# Patient Record
Sex: MALE | Race: BLACK OR AFRICAN AMERICAN | NOT HISPANIC OR LATINO | Employment: OTHER | URBAN - METROPOLITAN AREA
[De-identification: names, ages, dates, MRNs, and addresses within clinical notes are randomized per-mention and may not be internally consistent; named-entity substitution may affect disease eponyms.]

---

## 2018-11-14 ENCOUNTER — HOSPITAL ENCOUNTER (INPATIENT)
Facility: HOSPITAL | Age: 73
LOS: 9 days | Discharge: NURSING, REHAB OR LONG-TERM CARE | DRG: 064 | End: 2018-11-23
Attending: EMERGENCY MEDICINE | Admitting: PSYCHIATRY & NEUROLOGY
Payer: COMMERCIAL

## 2018-11-14 DIAGNOSIS — I61.9 INTRACEREBRAL HEMORRHAGE: ICD-10-CM

## 2018-11-14 DIAGNOSIS — R53.1 RIGHT SIDED WEAKNESS: ICD-10-CM

## 2018-11-14 DIAGNOSIS — I63.9 STROKE: ICD-10-CM

## 2018-11-14 DIAGNOSIS — I61.0 NONTRAUMATIC SUBCORTICAL HEMORRHAGE OF LEFT CEREBRAL HEMISPHERE: ICD-10-CM

## 2018-11-14 DIAGNOSIS — I10 HYPERTENSION, UNSPECIFIED TYPE: ICD-10-CM

## 2018-11-14 DIAGNOSIS — I61.9 ICH (INTRACEREBRAL HEMORRHAGE): ICD-10-CM

## 2018-11-14 DIAGNOSIS — I62.9 INTRACRANIAL HEMORRHAGE: Primary | ICD-10-CM

## 2018-11-14 DIAGNOSIS — I63.02 THROMBOTIC STROKE INVOLVING BASILAR ARTERY: ICD-10-CM

## 2018-11-14 DIAGNOSIS — R47.1 DYSARTHRIA: ICD-10-CM

## 2018-11-14 PROBLEM — N18.30 STAGE 3 CHRONIC KIDNEY DISEASE: Status: ACTIVE | Noted: 2018-11-14

## 2018-11-14 LAB
ALBUMIN SERPL BCP-MCNC: 3.3 G/DL
ALP SERPL-CCNC: 70 U/L
ALT SERPL W/O P-5'-P-CCNC: 13 U/L
ANION GAP SERPL CALC-SCNC: 7 MMOL/L
APTT BLDCRRT: 27.3 SEC
AST SERPL-CCNC: 21 U/L
BACTERIA #/AREA URNS AUTO: NORMAL /HPF
BASOPHILS # BLD AUTO: 0.01 K/UL
BASOPHILS NFR BLD: 0.3 %
BILIRUB SERPL-MCNC: 0.4 MG/DL
BILIRUB UR QL STRIP: NEGATIVE
BUN SERPL-MCNC: 22 MG/DL
CALCIUM SERPL-MCNC: 9.3 MG/DL
CHLORIDE SERPL-SCNC: 107 MMOL/L
CHOLEST SERPL-MCNC: 175 MG/DL
CHOLEST/HDLC SERPL: 2.8 {RATIO}
CLARITY UR REFRACT.AUTO: CLEAR
CO2 SERPL-SCNC: 27 MMOL/L
COLOR UR AUTO: YELLOW
CREAT SERPL-MCNC: 1.4 MG/DL
DIFFERENTIAL METHOD: ABNORMAL
EOSINOPHIL # BLD AUTO: 0.1 K/UL
EOSINOPHIL NFR BLD: 1.4 %
ERYTHROCYTE [DISTWIDTH] IN BLOOD BY AUTOMATED COUNT: 14.7 %
EST. GFR  (AFRICAN AMERICAN): 58 ML/MIN/1.73 M^2
EST. GFR  (NON AFRICAN AMERICAN): 50 ML/MIN/1.73 M^2
GLUCOSE SERPL-MCNC: 83 MG/DL
GLUCOSE UR QL STRIP: NEGATIVE
HCT VFR BLD AUTO: 35.8 %
HDLC SERPL-MCNC: 62 MG/DL
HDLC SERPL: 35.4 %
HGB BLD-MCNC: 11.6 G/DL
HGB UR QL STRIP: NEGATIVE
HYALINE CASTS UR QL AUTO: 0 /LPF
INR PPP: 1
KETONES UR QL STRIP: NEGATIVE
LDLC SERPL CALC-MCNC: 98.6 MG/DL
LEUKOCYTE ESTERASE UR QL STRIP: NEGATIVE
LYMPHOCYTES # BLD AUTO: 1.4 K/UL
LYMPHOCYTES NFR BLD: 39.3 %
MCH RBC QN AUTO: 28.2 PG
MCHC RBC AUTO-ENTMCNC: 32.4 G/DL
MCV RBC AUTO: 87 FL
MICROSCOPIC COMMENT: NORMAL
MONOCYTES # BLD AUTO: 0.3 K/UL
MONOCYTES NFR BLD: 9.7 %
NEUTROPHILS # BLD AUTO: 1.7 K/UL
NEUTROPHILS NFR BLD: 49.3 %
NITRITE UR QL STRIP: NEGATIVE
NONHDLC SERPL-MCNC: 113 MG/DL
PH UR STRIP: 6 [PH] (ref 5–8)
PLATELET # BLD AUTO: 218 K/UL
PMV BLD AUTO: 10.9 FL
POCT GLUCOSE: 118 MG/DL (ref 70–110)
POCT GLUCOSE: 79 MG/DL (ref 70–110)
POCT GLUCOSE: 95 MG/DL (ref 70–110)
POTASSIUM SERPL-SCNC: 3.9 MMOL/L
PROT SERPL-MCNC: 7.1 G/DL
PROT UR QL STRIP: ABNORMAL
PROTHROMBIN TIME: 10.3 SEC
RBC # BLD AUTO: 4.12 M/UL
RBC #/AREA URNS AUTO: 0 /HPF (ref 0–4)
SODIUM SERPL-SCNC: 141 MMOL/L
SP GR UR STRIP: 1.01 (ref 1–1.03)
TRIGL SERPL-MCNC: 72 MG/DL
TSH SERPL DL<=0.005 MIU/L-ACNC: 1.22 UIU/ML
URN SPEC COLLECT METH UR: ABNORMAL
WBC # BLD AUTO: 3.49 K/UL
WBC #/AREA URNS AUTO: 1 /HPF (ref 0–5)

## 2018-11-14 PROCEDURE — 96366 THER/PROPH/DIAG IV INF ADDON: CPT

## 2018-11-14 PROCEDURE — 99291 CRITICAL CARE FIRST HOUR: CPT | Mod: 25,GC,, | Performed by: PSYCHIATRY & NEUROLOGY

## 2018-11-14 PROCEDURE — 81001 URINALYSIS AUTO W/SCOPE: CPT

## 2018-11-14 PROCEDURE — 80061 LIPID PANEL: CPT

## 2018-11-14 PROCEDURE — 25000003 PHARM REV CODE 250: Performed by: PSYCHIATRY & NEUROLOGY

## 2018-11-14 PROCEDURE — 36620 INSERTION CATHETER ARTERY: CPT | Mod: ,,, | Performed by: PSYCHIATRY & NEUROLOGY

## 2018-11-14 PROCEDURE — 93005 ELECTROCARDIOGRAM TRACING: CPT

## 2018-11-14 PROCEDURE — 25000003 PHARM REV CODE 250: Performed by: PHYSICIAN ASSISTANT

## 2018-11-14 PROCEDURE — 25000003 PHARM REV CODE 250: Performed by: EMERGENCY MEDICINE

## 2018-11-14 PROCEDURE — 82962 GLUCOSE BLOOD TEST: CPT | Mod: 59

## 2018-11-14 PROCEDURE — 85730 THROMBOPLASTIN TIME PARTIAL: CPT

## 2018-11-14 PROCEDURE — 25000003 PHARM REV CODE 250: Performed by: STUDENT IN AN ORGANIZED HEALTH CARE EDUCATION/TRAINING PROGRAM

## 2018-11-14 PROCEDURE — 93010 ELECTROCARDIOGRAM REPORT: CPT | Mod: ,,, | Performed by: INTERNAL MEDICINE

## 2018-11-14 PROCEDURE — A4216 STERILE WATER/SALINE, 10 ML: HCPCS | Performed by: STUDENT IN AN ORGANIZED HEALTH CARE EDUCATION/TRAINING PROGRAM

## 2018-11-14 PROCEDURE — 20000000 HC ICU ROOM

## 2018-11-14 PROCEDURE — 80053 COMPREHEN METABOLIC PANEL: CPT

## 2018-11-14 PROCEDURE — 96365 THER/PROPH/DIAG IV INF INIT: CPT

## 2018-11-14 PROCEDURE — 85610 PROTHROMBIN TIME: CPT

## 2018-11-14 PROCEDURE — 99284 EMERGENCY DEPT VISIT MOD MDM: CPT | Mod: ,,, | Performed by: PHYSICIAN ASSISTANT

## 2018-11-14 PROCEDURE — 25000003 PHARM REV CODE 250: Performed by: NURSE PRACTITIONER

## 2018-11-14 PROCEDURE — G0425 INPT/ED TELECONSULT30: HCPCS | Mod: GT,,, | Performed by: PSYCHIATRY & NEUROLOGY

## 2018-11-14 PROCEDURE — 84443 ASSAY THYROID STIM HORMONE: CPT

## 2018-11-14 PROCEDURE — 99291 CRITICAL CARE FIRST HOUR: CPT | Mod: 25

## 2018-11-14 PROCEDURE — 63600175 PHARM REV CODE 636 W HCPCS: Performed by: STUDENT IN AN ORGANIZED HEALTH CARE EDUCATION/TRAINING PROGRAM

## 2018-11-14 PROCEDURE — 85025 COMPLETE CBC W/AUTO DIFF WBC: CPT

## 2018-11-14 RX ORDER — SODIUM,POTASSIUM PHOSPHATES 280-250MG
2 POWDER IN PACKET (EA) ORAL
Status: DISCONTINUED | OUTPATIENT
Start: 2018-11-14 | End: 2018-11-15

## 2018-11-14 RX ORDER — GLUCAGON 1 MG
1 KIT INJECTION
Status: DISCONTINUED | OUTPATIENT
Start: 2018-11-14 | End: 2018-11-23 | Stop reason: HOSPADM

## 2018-11-14 RX ORDER — INSULIN ASPART 100 [IU]/ML
1-10 INJECTION, SOLUTION INTRAVENOUS; SUBCUTANEOUS EVERY 6 HOURS PRN
Status: DISCONTINUED | OUTPATIENT
Start: 2018-11-14 | End: 2018-11-15

## 2018-11-14 RX ORDER — LANOLIN ALCOHOL/MO/W.PET/CERES
800 CREAM (GRAM) TOPICAL
Status: DISCONTINUED | OUTPATIENT
Start: 2018-11-14 | End: 2018-11-15

## 2018-11-14 RX ORDER — POTASSIUM CHLORIDE 20 MEQ/15ML
40 SOLUTION ORAL
Status: DISCONTINUED | OUTPATIENT
Start: 2018-11-14 | End: 2018-11-15

## 2018-11-14 RX ORDER — ENALAPRILAT 1.25 MG/ML
1.25 INJECTION INTRAVENOUS ONCE
Status: COMPLETED | OUTPATIENT
Start: 2018-11-15 | End: 2018-11-15

## 2018-11-14 RX ORDER — LABETALOL HCL 20 MG/4 ML
5 SYRINGE (ML) INTRAVENOUS ONCE
Status: COMPLETED | OUTPATIENT
Start: 2018-11-14 | End: 2018-11-14

## 2018-11-14 RX ORDER — HYDRALAZINE HYDROCHLORIDE 20 MG/ML
10 INJECTION INTRAMUSCULAR; INTRAVENOUS EVERY 4 HOURS PRN
Status: DISCONTINUED | OUTPATIENT
Start: 2018-11-14 | End: 2018-11-15

## 2018-11-14 RX ORDER — BISACODYL 10 MG
10 SUPPOSITORY, RECTAL RECTAL DAILY PRN
Status: DISCONTINUED | OUTPATIENT
Start: 2018-11-14 | End: 2018-11-15

## 2018-11-14 RX ORDER — LISINOPRIL 20 MG/1
40 TABLET ORAL DAILY
Status: DISCONTINUED | OUTPATIENT
Start: 2018-11-14 | End: 2018-11-15

## 2018-11-14 RX ORDER — NICARDIPINE HYDROCHLORIDE 0.2 MG/ML
5 INJECTION INTRAVENOUS CONTINUOUS
Status: DISCONTINUED | OUTPATIENT
Start: 2018-11-14 | End: 2018-11-14

## 2018-11-14 RX ORDER — NICARDIPINE HYDROCHLORIDE 0.2 MG/ML
2.5 INJECTION INTRAVENOUS CONTINUOUS
Status: DISCONTINUED | OUTPATIENT
Start: 2018-11-14 | End: 2018-11-15

## 2018-11-14 RX ORDER — SODIUM CHLORIDE 0.9 % (FLUSH) 0.9 %
3 SYRINGE (ML) INJECTION EVERY 8 HOURS
Status: DISCONTINUED | OUTPATIENT
Start: 2018-11-14 | End: 2018-11-23 | Stop reason: HOSPADM

## 2018-11-14 RX ORDER — LIDOCAINE HYDROCHLORIDE 10 MG/ML
1 INJECTION INFILTRATION; PERINEURAL ONCE
Status: COMPLETED | OUTPATIENT
Start: 2018-11-14 | End: 2018-11-14

## 2018-11-14 RX ORDER — LIDOCAINE HYDROCHLORIDE 10 MG/ML
1 INJECTION, SOLUTION EPIDURAL; INFILTRATION; INTRACAUDAL; PERINEURAL
Status: DISCONTINUED | OUTPATIENT
Start: 2018-11-14 | End: 2018-11-15

## 2018-11-14 RX ADMIN — LABETALOL HYDROCHLORIDE 5 MG: 5 INJECTION, SOLUTION INTRAVENOUS at 10:11

## 2018-11-14 RX ADMIN — LIDOCAINE HYDROCHLORIDE 1 ML: 10 INJECTION, SOLUTION INFILTRATION; PERINEURAL at 07:11

## 2018-11-14 RX ADMIN — Medication 3 ML: at 09:11

## 2018-11-14 RX ADMIN — NICARDIPINE HYDROCHLORIDE 10 MG/HR: 0.2 INJECTION, SOLUTION INTRAVENOUS at 12:11

## 2018-11-14 RX ADMIN — NICARDIPINE HYDROCHLORIDE 15 MG/HR: 0.2 INJECTION, SOLUTION INTRAVENOUS at 09:11

## 2018-11-14 RX ADMIN — LISINOPRIL 40 MG: 20 TABLET ORAL at 03:11

## 2018-11-14 RX ADMIN — HYDRALAZINE HYDROCHLORIDE 10 MG: 20 INJECTION INTRAMUSCULAR; INTRAVENOUS at 09:11

## 2018-11-14 RX ADMIN — NICARDIPINE HYDROCHLORIDE 5 MG/HR: 0.2 INJECTION, SOLUTION INTRAVENOUS at 11:11

## 2018-11-14 RX ADMIN — NICARDIPINE HYDROCHLORIDE 10 MG/HR: 0.2 INJECTION, SOLUTION INTRAVENOUS at 03:11

## 2018-11-14 NOTE — ASSESSMENT & PLAN NOTE
-11/14/18 CT head w/ 1.8 cm x 1.5 cm L BG hemorrhage, mild surrounding edema, no midline shift  -Vascular Neurology following, appreciate recs  -NSGY following, appreciate recs  -Suspect hypertensive hemorrhage  -Goal SBP < 140, on nicardipine gtt.  Will place arterial line, resume lisinopril.  -Follow up CT head w/o contrast 6 hrs after original per NSGY recs  -Hold VTE ppx, place SCDs  -Admit to Neuro ICU w/ q1h neuro checks
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
## 2018-11-14 NOTE — SUBJECTIVE & OBJECTIVE
Medications:  Continuous Infusions:   niCARdipine 15 mg/hr (11/14/18 1244)     Scheduled Meds:   lisinopril  40 mg Oral Daily     PRN Meds:dextrose 50%, glucagon (human recombinant), insulin aspart U-100, magnesium oxide, magnesium oxide, potassium chloride 10%, potassium chloride 10%, potassium chloride 10%, potassium, sodium phosphates, potassium, sodium phosphates, potassium, sodium phosphates     Review of Systems   Constitutional: no fever, chills or night sweats. No changes in weight   Eyes: no visual changes   ENT: no nasal congestion or sore throat   Respiratory: no cough or shortness of breath   Cardiovascular: no chest pain or palpitations   Gastrointestinal: no nausea or vomiting   Genitourinary: no hematuria or dysuria   Integument/Breast: no rash or pruritis   Hematologic/Lymphatic: no easy bruising or lymphadenopathy   Musculoskeletal: no arthralgias or myalgias.   Neurological: Positive for right sided weakness and slurred speech.   Behavioral/Psych: no auditory or visual hallucinations   Endocrine: no heat or cold intolerance       Objective:     Weight: 68 kg (150 lb)  Body mass index is 23.49 kg/m².  Vital Signs (Most Recent):  Temp: 98 °F (36.7 °C) (11/14/18 1222)  Pulse: 66 (11/14/18 1316)  Resp: 17 (11/14/18 1316)  BP: (!) 157/74 (11/14/18 1316)  SpO2: 100 % (11/14/18 1316) Vital Signs (24h Range):  Temp:  [97.8 °F (36.6 °C)-98 °F (36.7 °C)] 98 °F (36.7 °C)  Pulse:  [54-67] 66  Resp:  [14-38] 17  SpO2:  [98 %-100 %] 100 %  BP: (154-224)/() 157/74           Neurosurgery Physical Exam   General: well developed, poor dentition, no distress.   Head: normocephalic, atraumatic  Neurologic: Alert and oriented. Thought content appropriate.  GCS: Motor: 6/Verbal: 4/Eyes: 4 GCS Total: 14  Mental Status: Awake, Alert, Oriented to self only. Not oriented to place or year.   Language: No aphasia  Speech: dysarthric  Cranial nerves: right facial weakness, tongue midline, otherwise CN II-XII grossly  intact.   Eyes: pupils equal, round, reactive to light with accomodation, EOMI.   Pulmonary: normal respirations, no signs of respiratory distress  Abdomen: soft, non-distended, not tender to palpation  Sensory: response to light touch throughout  Motor Strength: No abnormal movements seen. LUE/LLE 5/5.  RUE 4-/5.  RLE 3/5.  Pronator Drift: no drift noted on left; unable to assess right 2/2 weakness  Finger-to-nose: Intact on left; unable to assess right 2/2 weakness  Clonus: absent  Babinski: absent  Skin: Skin is warm, dry and intact.      NIH Scale:  Interval: baseline (upon arrival/admit)  1a. Level Of Consciousness: 0-->Alert: keenly responsive  1b. LOC Questions: 1-->Answers one question correctly  1c. LOC Commands: 0-->Performs both tasks correctly  2. Best Gaze: 0-->Normal  3. Visual: 0-->No visual loss  4. Facial Palsy: 2-->Partial paralysis (total or near-total paralysis of lower face)  5a. Motor Arm, Left: 0-->No drift: limb holds 90 (or 45) degrees for full 10 secs  5b. Motor Arm, Right: 4-->No movement  6a. Motor Leg, Left: 0-->No drift: leg holds 30 degree position for full 5 secs  6b. Motor Leg, Right: 3-->No effort against gravity: leg falls to bed immediately  7. Limb Ataxia: 0-->Absent  8. Sensory: 0-->Normal: no sensory loss  9. Best Language: 0-->No aphasia: normal  10. Dysarthria: 1-->Mild-to-moderate dysarthria: patient slurs at least some words and, at worst, can be understood with some difficulty  11. Extinction and Inattention (formerly Neglect): 0-->No abnormality  Total (NIH Stroke Scale): 11      ICH Score: 0        Significant Labs:  Recent Labs   Lab 11/14/18  1103   GLU 83      K 3.9      CO2 27   BUN 22   CREATININE 1.4   CALCIUM 9.3     Recent Labs   Lab 11/14/18  1103   WBC 3.49*   HGB 11.6*   HCT 35.8*        Recent Labs   Lab 11/14/18  1103   INR 1.0   APTT 27.3         Significant Diagnostics:  Head CT:  I independently reviewed the imaging.     1. Acute left  basal ganglia hemorrhage without midline shift or hydrocephalus.  Given the location of the hematoma likely etiologies is hypertensive hemorrhage.  2. Extensive periventricular white matter changes in the centrum from chronic microvascular ischemia.

## 2018-11-14 NOTE — ED NOTES
Patient placed on cardiac monitor, continuous pulse ox, cycling blood pressures. Side rails up x2, call light within reach, bed in low position with brakes engaged. Cardene drip infusing.  Awaiting neurosurgery eval.

## 2018-11-14 NOTE — HPI
Nikolas Callaway is a 73 y.o. male with PMHx of HTN and tobaccos abuse who was seen via telestroke on 11/14/19 at OSH due to RSW and dysarthria. Patient with little known medical history, has not been to a doctor in years. BP on arrival to [facility] was 221/2012. CT revealed L thalamic ICH. He was transferred to [facility] for further care and admitted to neuro ICU. He remained in ICU for cardene gtt and blood pressure control. Stroke team consulted on 11/18/18, patient off cardene and ready for step down.

## 2018-11-14 NOTE — HPI
"74 yo M with PMHx HTN and tobacco use [place] [facility] [place] due to acute onset of dizziness.  No fall, head trauma, or loss of consciousness.  Went to [facility] where he began to notice RUE, RLE weakness.  ED staff noted facial droop and slurring, BP on admission up to 224/102.  At that point, patient was oriented and told ED staff that he hadn't been to a doctor in many years. Notes lisinopril as only medication he takes.  Had no headache, dizziness, n/v, vision changes, or neck pain at the time.  He is seen in [facility] [place] ~ 14:45 and has some disorientation (states he is 69 yo, lives with "60 year old grand-daughter").  CT head on admission notable for ~ 1.8 x 1.5 cm L BG hemorrhage without significant midline shift, but with small amount of surrounding edema.  Possible R cerebellar infarct also noted.  Currently requiring nicardipine, NSGY and Vascular Neurology following.  "

## 2018-11-14 NOTE — ED NOTES
Patient identifiers verified and correct for Nikolas Callaway.    LOC: The patient is awake, alert and aware of environment with an appropriate affect, the patient is oriented to person and place with slightly slurred speech  APPEARANCE: Patient resting comfortably and in no acute distress, patient appears unkempt  SKIN: The skin is warm and dry, color consistent with ethnicity, patient has normal skin turgor and dry mucus membranes, skin intact, no breakdown or bruising noted.  MUSCULOSKELETAL: LUE and LLE full ROM, impairment to RUE and RLE    RESPIRATORY: Airway is open and patent, respirations are spontaneous, patient has a normal effort and rate, no accessory muscle use noted, bilateral breath sounds clear  CARDIAC: Patient has a normal rate and regular rhythm, no peripheral edema noted, capillary refill < 3 seconds.  ABDOMEN: Soft and non tender to palpation, no distention noted, normoactive bowel sounds present in all four quadrants.  NEUROLOGIC: see neuro flowsheets
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
## 2018-11-14 NOTE — ASSESSMENT & PLAN NOTE
73 year old male with an unknown PMHx due to lack of preventative care appts, who [place] [facility] [place] as a transfer from [facility] after being found to have a left BG hemorrhage.    -Patient neurologically stable on exam.   -No acute neurosurgical intervention currently indicated  -Continue maximum medical management per NCC and Stroke  -Pending repeat head CT   -HTN: Currently on Cardene gtt. Maintain SBP < 140.   -Hold all anti coagulation medications  -Notify NSGY for any changes in neuro exam
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
## 2018-11-14 NOTE — H&P
"[facility]  Neurocritical Care  History & Physical    Admit Date: 11/14/2018  Service Date: 11/14/2018  Length of Stay: 0    Subjective:     Chief Complaint: Nontraumatic subcortical hemorrhage of left cerebral hemisphere    History of Present Illness: 74 yo M with PMHx HTN and tobacco use [place] [facility] [place] due to acute onset of dizziness.  No fall, head trauma, or loss of consciousness.  Went to [facility] where he began to notice RUE, RLE weakness.  ED staff noted facial droop and slurring, BP on admission up to 224/102.  At that point, patient was oriented and told ED staff that he hadn't been to a doctor in many years. Notes lisinopril as only medication he takes.  Had no headache, dizziness, n/v, vision changes, or neck pain at the time.  He is seen in [facility] [place] ~ 14:45 and has some disorientation (states he is 67 yo, lives with "60 year old grand-daughter").  CT head on admission notable for ~ 1.8 x 1.5 cm L BG hemorrhage without significant midline shift, but with small amount of surrounding edema.  Possible R cerebellar infarct also noted.  Currently requiring nicardipine, NSGY and Vascular Neurology following.    Past Medical History:   Diagnosis Date    Hypertension     Nontraumatic subcortical hemorrhage of left cerebral hemisphere 11/14/2018     Past Surgical History:   Procedure Laterality Date    REDUCTION-CLOSED Right 4/6/2015    Performed by Carmen Surgeon at [facility]      No current facility-administered medications on file prior to encounter.      Current Outpatient Medications on File Prior to Encounter   Medication Sig Dispense Refill    lisinopril 10 MG tablet Take 10 mg by mouth once daily.        Allergies: Patient has no known allergies.    History reviewed. No pertinent family history.  Social History     Tobacco Use    Smoking status: Current Every Day Smoker     Packs/day: 1.00     Types: Cigarettes     Start date: 1998    Smokeless tobacco: Never Used " "  Substance Use Topics    Alcohol use: No     Frequency: Never    Drug use: Yes     Types: "Crack" cocaine     Comment: last used 11/9/18     Review of Systems   Constitutional: Negative for chills and fever.   HENT: Negative for rhinorrhea and sneezing.    Eyes: Negative for photophobia and visual disturbance.   Respiratory: Negative for cough and shortness of breath.    Cardiovascular: Negative for leg swelling.   Gastrointestinal: Negative for nausea and vomiting.   Musculoskeletal: Negative for neck pain and neck stiffness.   Skin: Negative for rash and wound.   Neurological: Positive for speech difficulty (dysarthric) and weakness. Negative for numbness.   Hematological: Negative for adenopathy. Does not bruise/bleed easily.   Psychiatric/Behavioral: Positive for confusion. Negative for agitation.     Objective:     Vitals:  Temp: 98 °F (36.7 °C)  Pulse: 65  BP: (!) 140/69  MAP (mmHg): 98  Resp: 18  SpO2: 99 %  O2 Device (Oxygen Therapy): room air    Temp  Min: 97.8 °F (36.6 °C)  Max: 98 °F (36.7 °C)  Pulse  Min: 54  Max: 67  BP  Min: 140/69  Max: 224/102  MAP (mmHg)  Min: 98  Max: 146  Resp  Min: 14  Max: 38  SpO2  Min: 98 %  Max: 100 %    No intake/output data recorded.       Physical Exam  General:  Well-developed, well-nourished, nad  HEENT:  NCAT, PERRL, EOMI, oropharyngeal membranes non-erythematous/without exudate  Neck:  Supple, normal ROM without nuchal rigidity  Resp:  Symmetric expansion, no increased wob  CVS:  No LE edema, extremities warm/well-perfused  GI:  Abd soft, non-distended, non-tender to palpation  Neurologic Exam:  Mental Status:  Awake, alert, oriented to self and location but not to date.  Speech dysarthric, some disorientation (stated that he was '68' and his grand-daughter he lives with is 60).  Follows simple commands, difficulty with complex commands.  Cranial Nerves:  PERRL, EOMI. Facial movement intact, symmetric. Palate raises symmetrically, tongue protrudes midline.  " Trapezius 5/5 bilaterally.  Motor:  Normal bulk and tone. LUE, LLE 5/5 throughout.  RUE shoulder abduction 4-/5, biceps/triceps 4-/5,  strength 4/5.  RLE hip flexors 3/5, knee flexion/extension 4-/5, plantarflexion/dorsiflexion 4-/5.  Sensory:  Intact to light touch at all extremities and face without inattention.   Reflexes:  Biceps, brachioradialis, patellar brisk 2+ and symmetric.  No ankle clonus.   Coordination:  FNF, GLENNA, HTS intact with no dysmetria/ataxia/dysdiadochokinesia.  No resting tremor or myoclonus.  Gait:  Deferred 2/2 AMS and fall risk    Today I personally reviewed pertinent medications, lines/drains/airways, imaging, cardiology results, laboratory results, microbiology results, notably:    Medications:  Current Facility-Administered Medications:     bisacodyl suppository 10 mg, 10 mg, Rectal, Daily PRN, Freya Zamorano MD    dextrose 50% injection 12.5 g, 12.5 g, Intravenous, PRN, Dash Atkinson MD    glucagon (human recombinant) injection 1 mg, 1 mg, Intramuscular, PRN, Dash Atkinson MD    hydrALAZINE injection 10 mg, 10 mg, Intravenous, Q4H PRN, Freya Zamorano MD    insulin aspart U-100 pen 1-10 Units, 1-10 Units, Subcutaneous, Q6H PRN, Dash Atkinson MD    lisinopril tablet 40 mg, 40 mg, Oral, Daily, Dash Atkinson MD, 40 mg at 11/14/18 1511    magnesium oxide tablet 800 mg, 800 mg, Oral, PRN, Dash Atkinson MD    magnesium oxide tablet 800 mg, 800 mg, Oral, PRN, Dash Atkinson MD    niCARdipine 40 mg/200 mL infusion, 2.5 mg/hr, IV, Continuous, Taras Lam MD, Last Rate: 10 mg/hr at 11/14/18 1510    potassium chloride 10% oral solution 40 mEq, 40 mEq, Oral, PRN, Dash Atkinson MD    potassium chloride 10% oral solution 40 mEq, 40 mEq, Oral, PRN, Dash Atkinson MD    potassium chloride 10% oral solution 40 mEq, 40 mEq, Oral, PRN, Dash Atkinson MD    potassium, sodium phosphates 280-160-250  mg packet 2 packet, 2 packet, Oral, PRN, Dash Atkinson MD    potassium, sodium phosphates 280-160-250 mg packet 2 packet, 2 packet, Oral, PRN, Dash Atkinson MD    potassium, sodium phosphates 280-160-250 mg packet 2 packet, 2 packet, Oral, PRN, Dash Atkinson MD    sodium chloride 0.9% flush 3 mL, 3 mL, Intravenous, Q8H, Freya Zamorano MD    Current Outpatient Medications:     lisinopril 10 MG tablet, Take 10 mg by mouth once daily., Disp: , Rfl:     LDA:  18 PIV RUE LUE    Imagin18 CT head w/o contrast 14:24:  1. Grossly stable focus of intracranial hemorrhage centered in the region of the left basal ganglia.  There is stable to subtly increased surrounding edema.  This lies adjacent to a previous infarct.  Correlation advised, continued follow-up recommended.  2. Stable sequela of chronic microvascular ischemic change and senescent change.  3. Nonspecific punctate focus of low attenuation within the right cerebellum, age-indeterminate infarct is a consideration.    18 CT head w/o contrast 10:50:  1. Acute L basal ganglia hemorrhage w/o midline shift or hydrocephalus. Given location, likely etiologies is hypertensive hemorrhage.  2. Extensive periventricular white matter changes in the centrum from chronic microvascular ischemia.    Cardiology:  2D Echo w/ CFD:  PENDING    Labs:  Recent Labs   Lab 18  1103   WBC 3.49*   RBC 4.12*   HGB 11.6*   HCT 35.8*      MCV 87   MCH 28.2   MCHC 32.4     Recent Labs   Lab 18  1103   CALCIUM 9.3   PROT 7.1      K 3.9   CO2 27      BUN 22   CREATININE 1.4   ALKPHOS 70   ALT 13   AST 21   BILITOT 0.4     Microbiology:  Microbiology Results (last 7 days)     ** No results found for the last 168 hours. **        Assessment/Plan:     Neuro   * Nontraumatic subcortical hemorrhage of left cerebral hemisphere    -18 CT head w/ 1.8 cm x 1.5 cm L BG hemorrhage, mild surrounding edema, no  midline shift  -Vascular Neurology following, appreciate recs  -NSGY following, appreciate recs  -Suspect hypertensive hemorrhage  -Goal SBP < 140, on nicardipine gtt.  Will place arterial line, resume lisinopril.  -Follow up CT head w/o contrast 6 hrs after original per NSGY recs  -Hold VTE ppx, place SCDs  -Admit to Neuro ICU w/ q1h neuro checks     Dysarthria    -2/2 L BG ICH, SLP consulted     Cardiac/Vascular   Hypertension    -Resumed lisinopril at 40 mg qd  -Nicardipine gtt started, will place arterial line     Renal/   Stage 3 chronic kidney disease    -Possibly 2/2 HTN, Hgb A1c pending--unclear if pt has DM  -Will trend BUN, Cr.  IVFs if unable to tolerate diet.     Other   Right sided weakness    -2/2 L BG ICH, PT/OT consulted       The patient is being Prophylaxed for:  Venous Thromboembolism with: Mechanical  Stress Ulcer with: Not Applicable   Ventilator Pneumonia with: not applicable    Activity Orders          None        Full Code    Freya Zamorano MD  Neurocritical Care  [facility]

## 2018-11-14 NOTE — SUBJECTIVE & OBJECTIVE
"Past Medical History:   Diagnosis Date    Hypertension     Nontraumatic subcortical hemorrhage of left cerebral hemisphere 11/14/2018     Past Surgical History:   Procedure Laterality Date    REDUCTION-CLOSED Right 4/6/2015    Performed by Carmen Surgeon at [facility] Feliz current facility-administered medications on file prior to encounter.      Current Outpatient Medications on File Prior to Encounter   Medication Sig Dispense Refill    lisinopril 10 MG tablet Take 10 mg by mouth once daily.        Allergies: Patient has no known allergies.    History reviewed. No pertinent family history.  Social History     Tobacco Use    Smoking status: Current Every Day Smoker     Packs/day: 1.00     Types: Cigarettes     Start date: 1998    Smokeless tobacco: Never Used   Substance Use Topics    Alcohol use: No     Frequency: Never    Drug use: Yes     Types: "Crack" cocaine     Comment: last used 11/9/18     Review of Systems   Constitutional: Negative for chills and fever.   HENT: Negative for rhinorrhea and sneezing.    Eyes: Negative for photophobia and visual disturbance.   Respiratory: Negative for cough and shortness of breath.    Cardiovascular: Negative for leg swelling.   Gastrointestinal: Negative for nausea and vomiting.   Musculoskeletal: Negative for neck pain and neck stiffness.   Skin: Negative for rash and wound.   Neurological: Positive for speech difficulty (dysarthric) and weakness. Negative for numbness.   Hematological: Negative for adenopathy. Does not bruise/bleed easily.   Psychiatric/Behavioral: Positive for confusion. Negative for agitation.     Objective:     Vitals:  Temp: 98 °F (36.7 °C)  Pulse: 65  BP: (!) 140/69  MAP (mmHg): 98  Resp: 18  SpO2: 99 %  O2 Device (Oxygen Therapy): room air    Temp  Min: 97.8 °F (36.6 °C)  Max: 98 °F (36.7 °C)  Pulse  Min: 54  Max: 67  BP  Min: 140/69  Max: 224/102  MAP (mmHg)  Min: 98  Max: 146  Resp  Min: 14  Max: 38  SpO2  Min: 98 %  Max: 100 %    No " intake/output data recorded.       Physical Exam  General:  Well-developed, well-nourished, nad  HEENT:  NCAT, PERRL, EOMI, oropharyngeal membranes non-erythematous/without exudate  Neck:  Supple, normal ROM without nuchal rigidity  Resp:  Symmetric expansion, no increased wob  CVS:  No LE edema, extremities warm/well-perfused  GI:  Abd soft, non-distended, non-tender to palpation  Neurologic Exam:  Mental Status:  Awake, alert, oriented to self and location but not to date.  Speech dysarthric, some disorientation (stated that he was '68' and his grand-daughter he lives with is 60).  Follows simple commands, difficulty with complex commands.  Cranial Nerves:  PERRL, EOMI. Facial movement intact, symmetric. Palate raises symmetrically, tongue protrudes midline.  Trapezius 5/5 bilaterally.  Motor:  Normal bulk and tone. LUE, LLE 5/5 throughout.  RUE shoulder abduction 4-/5, biceps/triceps 4-/5,  strength 4/5.  RLE hip flexors 3/5, knee flexion/extension 4-/5, plantarflexion/dorsiflexion 4-/5.  Sensory:  Intact to light touch at all extremities and face without inattention.   Reflexes:  Biceps, brachioradialis, patellar brisk 2+ and symmetric.  No ankle clonus.   Coordination:  FNF, GLENNA, HTS intact with no dysmetria/ataxia/dysdiadochokinesia.  No resting tremor or myoclonus.  Gait:  Deferred 2/2 AMS and fall risk    Today I personally reviewed pertinent medications, lines/drains/airways, imaging, cardiology results, laboratory results, microbiology results, notably:    Medications:  Current Facility-Administered Medications:     bisacodyl suppository 10 mg, 10 mg, Rectal, Daily PRN, Freya Zamorano MD    dextrose 50% injection 12.5 g, 12.5 g, Intravenous, PRN, Dash Atkinson MD    glucagon (human recombinant) injection 1 mg, 1 mg, Intramuscular, PRN, Dash Atkinson MD    hydrALAZINE injection 10 mg, 10 mg, Intravenous, Q4H PRN, Freya Zamorano MD    insulin aspart U-100 pen  1-10 Units, 1-10 Units, Subcutaneous, Q6H PRN, Dash Atkinson MD    lisinopril tablet 40 mg, 40 mg, Oral, Daily, Dash Atkinson MD, 40 mg at 18 1511    magnesium oxide tablet 800 mg, 800 mg, Oral, PRN, Dash Atkinson MD    magnesium oxide tablet 800 mg, 800 mg, Oral, PRN, Dash Atkinson MD    niCARdipine 40 mg/200 mL infusion, 2.5 mg/hr, IV, Continuous, Taras Lam MD, Last Rate: 10 mg/hr at 18 1510    potassium chloride 10% oral solution 40 mEq, 40 mEq, Oral, PRN, Dash Atkinson MD    potassium chloride 10% oral solution 40 mEq, 40 mEq, Oral, PRN, Dash Atkinson MD    potassium chloride 10% oral solution 40 mEq, 40 mEq, Oral, PRN, Dash Atkinson MD    potassium, sodium phosphates 280-160-250 mg packet 2 packet, 2 packet, Oral, PRN, Dash Atkinson MD    potassium, sodium phosphates 280-160-250 mg packet 2 packet, 2 packet, Oral, PRN, Dash Atkinson MD    potassium, sodium phosphates 280-160-250 mg packet 2 packet, 2 packet, Oral, PRN, Dash Atkinson MD    sodium chloride 0.9% flush 3 mL, 3 mL, Intravenous, Q8H, Freya Zamorano MD    Current Outpatient Medications:     lisinopril 10 MG tablet, Take 10 mg by mouth once daily., Disp: , Rfl:     LDA:  18 KATHERYN ROWE    Imagin18 CT head w/o contrast 14:24:  1. Grossly stable focus of intracranial hemorrhage centered in the region of the left basal ganglia.  There is stable to subtly increased surrounding edema.  This lies adjacent to a previous infarct.  Correlation advised, continued follow-up recommended.  2. Stable sequela of chronic microvascular ischemic change and senescent change.  3. Nonspecific punctate focus of low attenuation within the right cerebellum, age-indeterminate infarct is a consideration.    18 CT head w/o contrast 10:50:  1. Acute L basal ganglia hemorrhage w/o midline shift or hydrocephalus. Given location, likely  etiologies is hypertensive hemorrhage.  2. Extensive periventricular white matter changes in the centrum from chronic microvascular ischemia.    Cardiology:  2D Echo w/ CFD:  PENDING    Labs:  Recent Labs   Lab 11/14/18  1103   WBC 3.49*   RBC 4.12*   HGB 11.6*   HCT 35.8*      MCV 87   MCH 28.2   MCHC 32.4     Recent Labs   Lab 11/14/18  1103   CALCIUM 9.3   PROT 7.1      K 3.9   CO2 27      BUN 22   CREATININE 1.4   ALKPHOS 70   ALT 13   AST 21   BILITOT 0.4     Microbiology:  Microbiology Results (last 7 days)     ** No results found for the last 168 hours. **
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
## 2018-11-14 NOTE — ED NOTES
Pt's daughter, Mayuri, would like to be updated on any changes and when her father is admitted. Her name and number are in the pt demographics. The pt gave permission to discuss his care with his daughter.
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
## 2018-11-14 NOTE — ED NOTES
Neurocritical care aware that patient passed MEREDITH screening. Also made aware that patient had second CT scan at 1354. MD to follow up with diet orders.

## 2018-11-14 NOTE — SUBJECTIVE & OBJECTIVE
History reviewed. No pertinent past medical history.  Past Surgical History:   Procedure Laterality Date    REDUCTION-CLOSED Right 4/6/2015    Performed by Carmen Surgeon at [facility]     History reviewed. No pertinent family history.  Social History     Tobacco Use    Smoking status: Current Every Day Smoker   Substance Use Topics    Alcohol use: Not on file    Drug use: Not on file     Review of patient's allergies indicates:  No Known Allergies    Medications: I have reviewed the current medication administration record.      (Not in a hospital admission)    Review of Systems   Constitutional: Negative for chills, diaphoresis and fever.   HENT: Negative for hearing loss, tinnitus and trouble swallowing.    Eyes: Negative for photophobia and visual disturbance.   Respiratory: Negative for chest tightness and shortness of breath.    Cardiovascular: Negative for chest pain and palpitations.   Gastrointestinal: Negative for vomiting.   Endocrine: Negative for cold intolerance.   Genitourinary: Negative for hematuria.   Musculoskeletal: Negative for neck pain and neck stiffness.   Skin: Negative for rash.   Neurological: Positive for dizziness, facial asymmetry, speech difficulty, weakness and headaches.   Hematological: Does not bruise/bleed easily.   Psychiatric/Behavioral: Positive for confusion. Negative for agitation and behavioral problems.     Objective:     Vital Signs (Most Recent):  Temp: 97.8 °F (36.6 °C) (11/14/18 1032)  Pulse: 61 (11/14/18 1127)  Resp: (!) 25 (11/14/18 1127)  BP: (!) 187/86 (11/14/18 1127)  SpO2: 100 % (11/14/18 1127)    Vital Signs Range (Last 24H):  Temp:  [97.8 °F (36.6 °C)]   Pulse:  [54-65]   Resp:  [18-38]   BP: (187-224)/()   SpO2:  [98 %-100 %]     Physical Exam   Constitutional: He appears well-developed and well-nourished.   HENT:   Head: Normocephalic and atraumatic.   Eyes: EOM are normal. Pupils are equal, round, and reactive to light.   Neck: Normal range of  motion. Neck supple.   Cardiovascular: Normal rate and regular rhythm.   Pulmonary/Chest: No respiratory distress.   Abdominal: He exhibits no mass.   Genitourinary:   Genitourinary Comments: No performed   Musculoskeletal: He exhibits no edema or deformity.   Neurological: He is alert. A cranial nerve deficit is present. No sensory deficit. He exhibits abnormal muscle tone. Coordination normal.   Disoriented to year and month   Skin: No rash noted. He is not diaphoretic. No erythema.   Psychiatric: He has a normal mood and affect.   Vitals reviewed.      Neurological Exam:   LOC: alert  Attention Span: Good   Language: No aphasia  Articulation: Dysarthria  Orientation: Not oriented to time  Visual Fields: Full  EOM (CN III, IV, VI): Full/intact  Pupils (CN II, III): PERRL  Facial Sensation (CN V): Normal  Facial Movement (CN VII): Lower facial weakness on the Right  Gag Reflex: present  Reflexes: 2+ throughout  Motor: Arm left  Normal 5/5  Leg left  Normal 5/5  Arm right  Plegia 0/5  Leg right Plegia 0/5  Cebellar: No tested  Sensation: Reacts to pain  Tone: Flaccid  RUE and RLE       Laboratory:  CMP:   Recent Labs   Lab 11/14/18  1103   CALCIUM 9.3   ALBUMIN 3.3*   PROT 7.1      K 3.9   CO2 27      BUN 22   CREATININE 1.4   ALKPHOS 70   ALT 13   AST 21   BILITOT 0.4     CBC:   Recent Labs   Lab 11/14/18  1103   WBC 3.49*   RBC 4.12*   HGB 11.6*   HCT 35.8*      MCV 87   MCH 28.2   MCHC 32.4     Lipid Panel:   Recent Labs   Lab 11/14/18  1103   CHOL 175   LDLCALC 98.6   HDL 62   TRIG 72     Coagulation:   Recent Labs   Lab 11/14/18  1103   INR 1.0   APTT 27.3     Hgb A1C: No results for input(s): HGBA1C in the last 168 hours.  TSH:   Recent Labs   Lab 11/14/18  1103   TSH 1.222       Diagnostic Results:      Brain imaging:  CT head reveal L thalamic/BG ICH without IV extension, hydrocephalus, or midline shift    Vessel Imaging:  None    Cardiac Evaluation:   NSR on bedside monitor
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
## 2018-11-14 NOTE — ASSESSMENT & PLAN NOTE
-Possibly 2/2 HTN, Hgb A1c pending--unclear if pt has DM  -Will trend BUN, Cr.  IVFs if unable to tolerate diet.

## 2018-11-14 NOTE — HOSPITAL COURSE
11/14/18:  Admission to Neuro ICU 2/2 L BG ICH, suspect hypertensive in etiology.  11/15/18: CT stable on repeat. Discontinue lisinopril due to recent kidney insult.start amlodipine. Started sq heparin

## 2018-11-14 NOTE — ED NOTES
Called daughter Mayuri informed that pt has bleeding on brain and is being transferred to [facility]

## 2018-11-15 LAB
ALBUMIN SERPL BCP-MCNC: 3 G/DL
ALP SERPL-CCNC: 62 U/L
ALT SERPL W/O P-5'-P-CCNC: 10 U/L
ANION GAP SERPL CALC-SCNC: 9 MMOL/L
APTT BLDCRRT: 25 SEC
ASCENDING AORTA: 3.42 CM
AST SERPL-CCNC: 17 U/L
AV MEAN GRADIENT: 7.25 MMHG
AV PEAK GRADIENT: 12.39 MMHG
AV VALVE AREA: 2.67 CM2
BASOPHILS # BLD AUTO: 0.02 K/UL
BASOPHILS NFR BLD: 0.4 %
BILIRUB SERPL-MCNC: 0.4 MG/DL
BSA FOR ECHO PROCEDURE: 1.76 M2
BUN SERPL-MCNC: 26 MG/DL
CALCIUM SERPL-MCNC: 8.9 MG/DL
CHLORIDE SERPL-SCNC: 108 MMOL/L
CO2 SERPL-SCNC: 26 MMOL/L
CREAT SERPL-MCNC: 1.7 MG/DL
CV ECHO LV RWT: 0.47 CM
DIFFERENTIAL METHOD: ABNORMAL
DOP CALC AO PEAK VEL: 1.76 M/S
DOP CALC AO VTI: 30.46 CM
DOP CALC LVOT AREA: 3.2 CM2
DOP CALC LVOT DIAMETER: 2.02 CM
DOP CALC LVOT STROKE VOLUME: 81.33 CM3
DOP CALCLVOT PEAK VEL VTI: 25.39 CM
E WAVE DECELERATION TIME: 223.64 MSEC
E/A RATIO: 1.17
E/E' RATIO: 10.74
ECHO LV POSTERIOR WALL: 1.1 CM (ref 0.6–1.1)
EOSINOPHIL # BLD AUTO: 0.1 K/UL
EOSINOPHIL NFR BLD: 1.5 %
ERYTHROCYTE [DISTWIDTH] IN BLOOD BY AUTOMATED COUNT: 14.4 %
EST. GFR  (AFRICAN AMERICAN): 45.6 ML/MIN/1.73 M^2
EST. GFR  (NON AFRICAN AMERICAN): 39.4 ML/MIN/1.73 M^2
ESTIMATED AVG GLUCOSE: 103 MG/DL
FRACTIONAL SHORTENING: 47 % (ref 28–44)
GLUCOSE SERPL-MCNC: 103 MG/DL
HBA1C MFR BLD HPLC: 5.2 %
HCT VFR BLD AUTO: 33.9 %
HGB BLD-MCNC: 10.5 G/DL
IMM GRANULOCYTES # BLD AUTO: 0.02 K/UL
IMM GRANULOCYTES NFR BLD AUTO: 0.4 %
INR PPP: 0.9
INTERVENTRICULAR SEPTUM: 1.13 CM (ref 0.6–1.1)
LA MAJOR: 5.66 CM
LA MINOR: 5.62 CM
LA WIDTH: 3.72 CM
LEFT ATRIUM SIZE: 4.02 CM
LEFT ATRIUM VOLUME INDEX: 40.7 ML/M2
LEFT ATRIUM VOLUME: 71.69 CM3
LEFT INTERNAL DIMENSION IN SYSTOLE: 2.5 CM (ref 2.1–4)
LEFT VENTRICLE DIASTOLIC VOLUME INDEX: 57.91 ML/M2
LEFT VENTRICLE DIASTOLIC VOLUME: 101.93 ML
LEFT VENTRICLE MASS INDEX: 108.2 G/M2
LEFT VENTRICLE SYSTOLIC VOLUME INDEX: 12.7 ML/M2
LEFT VENTRICLE SYSTOLIC VOLUME: 22.42 ML
LEFT VENTRICULAR INTERNAL DIMENSION IN DIASTOLE: 4.69 CM (ref 3.5–6)
LEFT VENTRICULAR MASS: 190.47 G
LV LATERAL E/E' RATIO: 10.2
LV SEPTAL E/E' RATIO: 11.33
LYMPHOCYTES # BLD AUTO: 2.2 K/UL
LYMPHOCYTES NFR BLD: 41.2 %
MAGNESIUM SERPL-MCNC: 2.1 MG/DL
MCH RBC QN AUTO: 27.6 PG
MCHC RBC AUTO-ENTMCNC: 31 G/DL
MCV RBC AUTO: 89 FL
MONOCYTES # BLD AUTO: 0.5 K/UL
MONOCYTES NFR BLD: 9 %
MV PEAK A VEL: 0.87 M/S
MV PEAK E VEL: 1.02 M/S
NEUTROPHILS # BLD AUTO: 2.5 K/UL
NEUTROPHILS NFR BLD: 47.5 %
NRBC BLD-RTO: 0 /100 WBC
PHOSPHATE SERPL-MCNC: 3.4 MG/DL
PLATELET # BLD AUTO: 225 K/UL
PMV BLD AUTO: 10.8 FL
POCT GLUCOSE: 101 MG/DL (ref 70–110)
POCT GLUCOSE: 123 MG/DL (ref 70–110)
POCT GLUCOSE: 139 MG/DL (ref 70–110)
POCT GLUCOSE: 89 MG/DL (ref 70–110)
POCT GLUCOSE: 90 MG/DL (ref 70–110)
POTASSIUM SERPL-SCNC: 3.5 MMOL/L
PROT SERPL-MCNC: 6.5 G/DL
PROTHROMBIN TIME: 10 SEC
RA MAJOR: 4.58 CM
RA PRESSURE: 3 MMHG
RA WIDTH: 3.3 CM
RBC # BLD AUTO: 3.81 M/UL
RIGHT VENTRICULAR END-DIASTOLIC DIMENSION: 2.55 CM
SINUS: 2.83 CM
SODIUM SERPL-SCNC: 143 MMOL/L
STJ: 3.19 CM
TDI LATERAL: 0.1
TDI SEPTAL: 0.09
TDI: 0.1
TRICUSPID ANNULAR PLANE SYSTOLIC EXCURSION: 3.09 CM
WBC # BLD AUTO: 5.31 K/UL

## 2018-11-15 PROCEDURE — 97112 NEUROMUSCULAR REEDUCATION: CPT

## 2018-11-15 PROCEDURE — 80053 COMPREHEN METABOLIC PANEL: CPT

## 2018-11-15 PROCEDURE — 63600175 PHARM REV CODE 636 W HCPCS: Performed by: PHYSICIAN ASSISTANT

## 2018-11-15 PROCEDURE — 20000000 HC ICU ROOM

## 2018-11-15 PROCEDURE — 27200188 HC TRANSDUCER, ART ADULT/PEDS

## 2018-11-15 PROCEDURE — 85610 PROTHROMBIN TIME: CPT

## 2018-11-15 PROCEDURE — 92523 SPEECH SOUND LANG COMPREHEN: CPT

## 2018-11-15 PROCEDURE — 99232 SBSQ HOSP IP/OBS MODERATE 35: CPT | Mod: GC,,, | Performed by: NEUROLOGICAL SURGERY

## 2018-11-15 PROCEDURE — 85025 COMPLETE CBC W/AUTO DIFF WBC: CPT

## 2018-11-15 PROCEDURE — 97167 OT EVAL HIGH COMPLEX 60 MIN: CPT

## 2018-11-15 PROCEDURE — 83735 ASSAY OF MAGNESIUM: CPT

## 2018-11-15 PROCEDURE — G8987 SELF CARE CURRENT STATUS: HCPCS | Mod: CM

## 2018-11-15 PROCEDURE — 97162 PT EVAL MOD COMPLEX 30 MIN: CPT

## 2018-11-15 PROCEDURE — 99291 CRITICAL CARE FIRST HOUR: CPT | Mod: GC,,, | Performed by: PSYCHIATRY & NEUROLOGY

## 2018-11-15 PROCEDURE — G8979 MOBILITY GOAL STATUS: HCPCS | Mod: CK

## 2018-11-15 PROCEDURE — 85730 THROMBOPLASTIN TIME PARTIAL: CPT

## 2018-11-15 PROCEDURE — 94761 N-INVAS EAR/PLS OXIMETRY MLT: CPT

## 2018-11-15 PROCEDURE — 25000003 PHARM REV CODE 250: Performed by: STUDENT IN AN ORGANIZED HEALTH CARE EDUCATION/TRAINING PROGRAM

## 2018-11-15 PROCEDURE — 25000003 PHARM REV CODE 250: Performed by: PHYSICIAN ASSISTANT

## 2018-11-15 PROCEDURE — 92610 EVALUATE SWALLOWING FUNCTION: CPT

## 2018-11-15 PROCEDURE — 84100 ASSAY OF PHOSPHORUS: CPT

## 2018-11-15 PROCEDURE — A4216 STERILE WATER/SALINE, 10 ML: HCPCS | Performed by: STUDENT IN AN ORGANIZED HEALTH CARE EDUCATION/TRAINING PROGRAM

## 2018-11-15 PROCEDURE — G8978 MOBILITY CURRENT STATUS: HCPCS | Mod: CL

## 2018-11-15 PROCEDURE — 36620 INSERTION CATHETER ARTERY: CPT

## 2018-11-15 PROCEDURE — 25000003 PHARM REV CODE 250: Performed by: EMERGENCY MEDICINE

## 2018-11-15 PROCEDURE — 25000003 PHARM REV CODE 250: Performed by: PSYCHIATRY & NEUROLOGY

## 2018-11-15 PROCEDURE — G8988 SELF CARE GOAL STATUS: HCPCS | Mod: CL

## 2018-11-15 PROCEDURE — 83036 HEMOGLOBIN GLYCOSYLATED A1C: CPT

## 2018-11-15 PROCEDURE — 63600175 PHARM REV CODE 636 W HCPCS: Performed by: NURSE PRACTITIONER

## 2018-11-15 RX ORDER — INSULIN ASPART 100 [IU]/ML
1-10 INJECTION, SOLUTION INTRAVENOUS; SUBCUTANEOUS
Status: DISCONTINUED | OUTPATIENT
Start: 2018-11-15 | End: 2018-11-23 | Stop reason: HOSPADM

## 2018-11-15 RX ORDER — HEPARIN SODIUM 5000 [USP'U]/ML
5000 INJECTION, SOLUTION INTRAVENOUS; SUBCUTANEOUS EVERY 8 HOURS
Status: DISCONTINUED | OUTPATIENT
Start: 2018-11-15 | End: 2018-11-23 | Stop reason: HOSPADM

## 2018-11-15 RX ORDER — AMLODIPINE BESYLATE 10 MG/1
10 TABLET ORAL DAILY
Status: DISCONTINUED | OUTPATIENT
Start: 2018-11-15 | End: 2018-11-23 | Stop reason: HOSPADM

## 2018-11-15 RX ORDER — HYDRALAZINE HYDROCHLORIDE 20 MG/ML
10 INJECTION INTRAMUSCULAR; INTRAVENOUS EVERY 4 HOURS PRN
Status: DISCONTINUED | OUTPATIENT
Start: 2018-11-15 | End: 2018-11-15

## 2018-11-15 RX ORDER — HYDRALAZINE HYDROCHLORIDE 20 MG/ML
10 INJECTION INTRAMUSCULAR; INTRAVENOUS EVERY 4 HOURS PRN
Status: DISCONTINUED | OUTPATIENT
Start: 2018-11-15 | End: 2018-11-17

## 2018-11-15 RX ORDER — NICARDIPINE HYDROCHLORIDE 0.2 MG/ML
2.5 INJECTION INTRAVENOUS CONTINUOUS
Status: DISCONTINUED | OUTPATIENT
Start: 2018-11-15 | End: 2018-11-17

## 2018-11-15 RX ORDER — AMOXICILLIN 250 MG
1 CAPSULE ORAL DAILY
Status: DISCONTINUED | OUTPATIENT
Start: 2018-11-15 | End: 2018-11-23 | Stop reason: HOSPADM

## 2018-11-15 RX ORDER — HEPARIN SODIUM 5000 [USP'U]/ML
5000 INJECTION, SOLUTION INTRAVENOUS; SUBCUTANEOUS EVERY 12 HOURS
Status: DISCONTINUED | OUTPATIENT
Start: 2018-11-15 | End: 2018-11-15

## 2018-11-15 RX ADMIN — NICARDIPINE HYDROCHLORIDE 15 MG/HR: 0.2 INJECTION, SOLUTION INTRAVENOUS at 03:11

## 2018-11-15 RX ADMIN — NICARDIPINE HYDROCHLORIDE 10 MG/HR: 0.2 INJECTION, SOLUTION INTRAVENOUS at 09:11

## 2018-11-15 RX ADMIN — Medication 3 ML: at 05:11

## 2018-11-15 RX ADMIN — LISINOPRIL 40 MG: 20 TABLET ORAL at 08:11

## 2018-11-15 RX ADMIN — NICARDIPINE HYDROCHLORIDE 15 MG/HR: 0.2 INJECTION, SOLUTION INTRAVENOUS at 07:11

## 2018-11-15 RX ADMIN — ENALAPRILAT 1.25 MG: 2.5 INJECTION INTRAVENOUS at 12:11

## 2018-11-15 RX ADMIN — Medication 3 ML: at 02:11

## 2018-11-15 RX ADMIN — POTASSIUM CHLORIDE 40 MEQ: 20 SOLUTION ORAL at 05:11

## 2018-11-15 RX ADMIN — NICARDIPINE HYDROCHLORIDE 15 MG/HR: 0.2 INJECTION, SOLUTION INTRAVENOUS at 12:11

## 2018-11-15 RX ADMIN — HEPARIN SODIUM 5000 UNITS: 5000 INJECTION, SOLUTION INTRAVENOUS; SUBCUTANEOUS at 09:11

## 2018-11-15 RX ADMIN — HEPARIN SODIUM 5000 UNITS: 5000 INJECTION, SOLUTION INTRAVENOUS; SUBCUTANEOUS at 10:11

## 2018-11-15 RX ADMIN — Medication 3 ML: at 09:11

## 2018-11-15 RX ADMIN — AMLODIPINE BESYLATE 10 MG: 10 TABLET ORAL at 10:11

## 2018-11-15 RX ADMIN — NICARDIPINE HYDROCHLORIDE 10 MG/HR: 0.2 INJECTION, SOLUTION INTRAVENOUS at 04:11

## 2018-11-15 RX ADMIN — NICARDIPINE HYDROCHLORIDE 7.5 MG/HR: 0.2 INJECTION, SOLUTION INTRAVENOUS at 11:11

## 2018-11-15 RX ADMIN — HYDRALAZINE HYDROCHLORIDE 10 MG: 20 INJECTION INTRAMUSCULAR; INTRAVENOUS at 02:11

## 2018-11-15 RX ADMIN — STANDARDIZED SENNA CONCENTRATE AND DOCUSATE SODIUM 1 TABLET: 8.6; 5 TABLET, FILM COATED ORAL at 11:11

## 2018-11-15 NOTE — PROCEDURES
"Nikolas Callaway is a 73 y.o. male patient.    Temp: 98.4 °F (36.9 °C) (18)  Pulse: 73 (18)  Resp: (!) 24 (18)  BP: (!) 149/73 (18)  SpO2: 100 % (18)  Weight: 63.9 kg (140 lb 14 oz) (18)  Height: 5' 9" (175.3 cm) (18)       Arterial Line  Date/Time: 2018 7:47 PM  Location procedure was performed: [facility] NEURO CRITICAL CARE  Performed by: Dash Atkinson MD  Authorized by: Dash Atkinson MD   Pre-op Diagnosis: ICH  Post-operative diagnosis: ICH  Consent Done: Yes  Consent: Verbal consent obtained. Written consent obtained.  Risks and benefits: risks, benefits and alternatives were discussed  Consent given by: patient  Procedure consent: procedure consent matches procedure scheduled  Required items: required blood products, implants, devices, and special equipment available  Patient identity confirmed: , MRN and name  Time out: Immediately prior to procedure a "time out" was called to verify the correct patient, procedure, equipment, support staff and site/side marked as required.  Preparation: Patient was prepped and draped in the usual sterile fashion.  Indications: hemodynamic monitoring  Location: right radial  Needle gauge: 20  Seldinger technique: Seldinger technique used  Number of attempts: 1  Complications: No  Post-procedure: dressing applied  Post-procedure CMS: normal  Patient tolerance: Patient tolerated the procedure well with no immediate complications          Dash Atkinson  2018  "

## 2018-11-15 NOTE — PROGRESS NOTES
"[facility]  Neurocritical Care  Progress Note    Admit Date: 11/14/2018  Service Date: 11/15/2018  Length of Stay: 1    Subjective:     Chief Complaint: Nontraumatic subcortical hemorrhage of left cerebral hemisphere    History of Present Illness: 74 yo M with PMHx HTN and tobacco use presents to [facility] 11/14/18 due to acute onset of dizziness.  No fall, head trauma, or loss of consciousness.  Went to [facility] where he began to notice RUE, RLE weakness.  ED staff noted facial droop and slurring, BP on admission up to 224/102.  At that point, patient was oriented and told ED staff that he hadn't been to a doctor in many years. Notes lisinopril as only medication he takes.  Had no headache, dizziness, n/v, vision changes, or neck pain at the time.  He is seen in [facility] ED ~ 14:45 and has some disorientation (states he is 69 yo, lives with "60 year old grand-daughter").  CT head on admission notable for ~ 1.8 x 1.5 cm L BG hemorrhage without significant midline shift, but with small amount of surrounding edema.  Possible R cerebellar infarct also noted.  Currently requiring nicardipine, NSGY and Vascular Neurology following.    Hospital Course: 11/14/18:  Admission to Neuro ICU 2/2 L BG ICH, suspect hypertensive in etiology.  11/15/18: CT stable on repeat. Discontinue lisinopril due to recent kidney insult.start amlodipine. Started sq heparin     Interval History:   Neuro: L BG ICH stable on repeat imaging,.   CVS: MAP> 65, SBP <160. On cardene. Discontinued lisinopril, started amlodipine  Pulm: on RA  KidneyS: dysfunction. Monitor urine output, avoid nephrotoxins     Review of Systems   HENT: Negative for trouble swallowing and voice change.    Eyes: Negative for photophobia and visual disturbance.   Respiratory: Negative for apnea, chest tightness and shortness of breath.    Cardiovascular: Negative for chest pain, palpitations and leg swelling.   Gastrointestinal: Negative for diarrhea, nausea, " rectal pain and vomiting.   Genitourinary: Negative for difficulty urinating.   Neurological: Positive for speech difficulty and weakness.   Psychiatric/Behavioral: Negative.      Objective:     Vitals:  Temp: 98.1 °F (36.7 °C)  Pulse: 65  Rhythm: normal sinus rhythm  BP: (!) 147/51  MAP (mmHg): 81  Resp: 13  SpO2: 99 %  O2 Device (Oxygen Therapy): room air    Temp  Min: 97.7 °F (36.5 °C)  Max: 98.4 °F (36.9 °C)  Pulse  Min: 55  Max: 86  BP  Min: 111/59  Max: 163/77  MAP (mmHg)  Min: 80  Max: 110  Resp  Min: 12  Max: 28  SpO2  Min: 98 %  Max: 100 %    11/14 0701 - 11/15 0700  In: 1150 [I.V.:1150]  Out: -    Unmeasured Output  Urine Occurrence: 1       Physical Exam   Constitutional: He appears well-developed and well-nourished.   HENT:   Head: Normocephalic.   Eyes: EOM are normal. Pupils are equal, round, and reactive to light.   Cardiovascular: Normal rate and regular rhythm.   Pulmonary/Chest: Effort normal and breath sounds normal.   Abdominal: Soft.   Neurological: He is alert. He displays normal reflexes. No cranial nerve deficit. Coordination normal.   Oriented to self. Not oriented to date. States his mother who is 62 y (patient is 73 y) will visit    intact  Motor:  Normal bulk and tone. LUE, LLE 5/5 throughout.  RUE shoulder abduction 4-/5, biceps/triceps 4-/5,  strength 4/5.  RLE hip flexors 3/5, knee flexion/extension 4-/5, plantarflexion/dorsiflexion 4-/5.  Sensory:  Intact to light touch at all extremities and face without inattention.   Reflexes:  symmetric 2+  Coordination: FTN intact        Medications:  Continuous  niCARdipine Last Rate: 7.5 mg/hr (11/15/18 1100)   Scheduled  amLODIPine 10 mg Daily   heparin (porcine) 5,000 Units Q8H   senna-docusate 8.6-50 mg 1 tablet Daily   sodium chloride 0.9% 3 mL Q8H   PRN  dextrose 50% 12.5 g PRN   glucagon (human recombinant) 1 mg PRN   hydrALAZINE 10 mg Q4H PRN   insulin aspart U-100 1-10 Units QID (AC + HS) PRN     Today I personally reviewed  pertinent medications, lines/drains/airways, imaging, cardiology results, laboratory results, microbiology results, notably:    Diet  Diet Adult Regular (IDDSI Level 7)  Diet Adult Regular (IDDSI Level 7)        Assessment/Plan:     Neuro   * Nontraumatic subcortical hemorrhage of left cerebral hemisphere    -11/14/18 CT head w/ 1.8 cm x 1.5 cm L BG hemorrhage, mild surrounding edema, no midline shift  -Vascular Neurology following, appreciate recs  -NSGY following, appreciate recs  -Suspect hypertensive hemorrhage  -Goal SBP < 140, on nicardipine gtt.  Will place arterial line, resume lisinopril.  -Follow up CT head w/o contrast 6 hrs after original per NSGY recs  -Hold VTE ppx, place SCDs  -Admit to Neuro ICU w/ q1h neuro checks    11/15: repeat brain imaging stable    Resp: breathing on RA stable    CVS: MAP> 65 and SBP <160 goal  On cardene titrable  Started amlodipine  Discontinued lisinopril due to kidney dysfunction    GI: eating, on bowel regimen    Kidneys: avoid nephrotoxic drugs.                 Monitor I/O  Heme: stable  ID: no issues                  Dysarthria    -2/2 L BG ICH, SLP consulted     Cardiac/Vascular   Hypertension    -discontinued lisinopril. Started amlodipine  -Nicardipine gtt started, will place arterial line     Renal/   Stage 3 chronic kidney disease    -Possibly 2/2 HTN,  Discontinue lisinopril  Avoid nephrotoxins       Other   Right sided weakness    -2/2 L BG ICH, PT/OT consulted           The patient is being Prophylaxed for:  Venous Thromboembolism with: Mechanical or Chemical  Stress Ulcer with: none  Ventilator Pneumonia with: none    Activity Orders          None        Full Code    Colette Warren MD  Neurocritical Care  [facility]

## 2018-11-15 NOTE — PLAN OF CARE
Problem: Patient Care Overview  Goal: Plan of Care Review  Outcome: Ongoing (interventions implemented as appropriate)  POC reviewed with pt at 0500. Pt verbalized understanding. Questions and concerns addressed. Pt hypertensive, NCC aware. Ordered antihypertensives. Pt tolerating well. Pt progressing toward goals. Will continue to monitor. See flowsheets for full assessment and VS info

## 2018-11-15 NOTE — SUBJECTIVE & OBJECTIVE
Interval History: repeat CT stable overnight,.     Medications:  Continuous Infusions:   niCARdipine 7.5 mg/hr (11/15/18 1002)     Scheduled Meds:   amLODIPine  10 mg Oral Daily    heparin (porcine)  5,000 Units Subcutaneous Q8H    senna-docusate 8.6-50 mg  1 tablet Oral Daily    sodium chloride 0.9%  3 mL Intravenous Q8H     PRN Meds:dextrose 50%, glucagon (human recombinant), hydrALAZINE, insulin aspart U-100     Review of Systems  Objective:     Weight: 63.9 kg (140 lb 14 oz)  Body mass index is 20.8 kg/m².  Vital Signs (Most Recent):  Temp: 98.1 °F (36.7 °C) (11/15/18 0702)  Pulse: 65 (11/15/18 1002)  Resp: 13 (11/15/18 1002)  BP: (!) 120/56 (11/15/18 0902)  SpO2: 99 % (11/15/18 1002) Vital Signs (24h Range):  Temp:  [97.7 °F (36.5 °C)-98.4 °F (36.9 °C)] 98.1 °F (36.7 °C)  Pulse:  [54-86] 65  Resp:  [12-38] 13  SpO2:  [98 %-100 %] 99 %  BP: (111-224)/() 120/56  Arterial Line BP: (135-196)/(48-73) 151/48     Date 11/15/18 0700 - 11/16/18 0659   Shift 8504-8669 4110-7498 9322-3844 24 Hour Total   INTAKE   P.O. 400   400   I.V.(mL/kg) 253.1(4)   253.1(4)   Shift Total(mL/kg) 653.1(10.2)   653.1(10.2)   OUTPUT   Shift Total(mL/kg)       Weight (kg) 63.9 63.9 63.9 63.9                        Neurosurgery Physical Exam  General: well developed, poor dentition, no distress.   Head: normocephalic, atraumatic  Neurologic: Alert and oriented. Thought content appropriate.  GCS: Motor: 6/Verbal: 4/Eyes: 4 GCS Total: 14  Mental Status: Awake, Alert, Oriented to self only. Not oriented to place or year.   Language: No aphasia  Speech: dysarthric  Cranial nerves: right facial weakness, tongue midline, otherwise CN II-XII grossly intact.   Eyes: pupils equal, round, reactive to light with accomodation, EOMI.   Pulmonary: normal respirations, no signs of respiratory distress  Abdomen: soft, non-distended, not tender to palpation  Sensory: response to light touch throughout  Motor Strength: No abnormal movements seen.  LUE/LLE 5/5.  RUE/RLE 3/5.  Pronator Drift: no drift noted on left; unable to assess right 2/2 weakness  Finger-to-nose: Intact on left; unable to assess right 2/2 weakness  Clonus: absent  Babinski: absent  Skin: Skin is warm, dry and intact.        NIH Scale:  Interval: baseline (upon arrival/admit)  1a. Level Of Consciousness: 0-->Alert: keenly responsive  1b. LOC Questions: 1-->Answers one question correctly  1c. LOC Commands: 0-->Performs both tasks correctly  2. Best Gaze: 0-->Normal  3. Visual: 0-->No visual loss  4. Facial Palsy: 2-->Partial paralysis (total or near-total paralysis of lower face)  5a. Motor Arm, Left: 0-->No drift: limb holds 90 (or 45) degrees for full 10 secs  5b. Motor Arm, Right: 4-->No movement  6a. Motor Leg, Left: 0-->No drift: leg holds 30 degree position for full 5 secs  6b. Motor Leg, Right: 3-->No effort against gravity: leg falls to bed immediately  7. Limb Ataxia: 0-->Absent  8. Sensory: 0-->Normal: no sensory loss  9. Best Language: 0-->No aphasia: normal  10. Dysarthria: 1-->Mild-to-moderate dysarthria: patient slurs at least some words and, at worst, can be understood with some difficulty  11. Extinction and Inattention (formerly Neglect): 0-->No abnormality  Total (NIH Stroke Scale): 11        ICH Score: 0      Significant Labs:  Recent Labs   Lab 11/14/18  1103 11/15/18  0100   GLU 83 103    143   K 3.9 3.5    108   CO2 27 26   BUN 22 26*   CREATININE 1.4 1.7*   CALCIUM 9.3 8.9   MG  --  2.1     Recent Labs   Lab 11/14/18 1103 11/15/18  0100   WBC 3.49* 5.31   HGB 11.6* 10.5*   HCT 35.8* 33.9*    225     Recent Labs   Lab 11/14/18  1103 11/15/18  0100   INR 1.0 0.9   APTT 27.3 25.0     Microbiology Results (last 7 days)     ** No results found for the last 168 hours. **        All pertinent labs from the last 24 hours have been reviewed.    Significant Diagnostics:  I have reviewed all pertinent imaging results/findings within the past 24 hours.

## 2018-11-15 NOTE — PLAN OF CARE
11/15/18 1532   Discharge Assessment   Assessment Type Discharge Planning Assessment   Confirmed/corrected address and phone number on facesheet? Yes   Assessment information obtained from? Caregiver;Patient   Expected Length of Stay (days) 5   Communicated expected length of stay with patient/caregiver yes   Prior to hospitilization cognitive status: Alert/Oriented   Prior to hospitalization functional status: Independent   Current cognitive status: Alert/Oriented   Current Functional Status: Needs Assistance   Lives With spouse   Able to Return to Prior Arrangements unable to determine at this time (comments)   Is patient able to care for self after discharge? Unable to determine at this time (comments)   Who are your caregiver(s) and their phone number(s)? Berta Watson ( [phone] wife) , Anny Miriam ( [phone] daughter), Mayuri otto ([phone]) - step daughter    Patient's perception of discharge disposition rehab facility   Readmission Within The Last 30 Days no previous admission in last 30 days   Patient currently being followed by outpatient case management? No   Patient currently receives any other outside agency services? No   Equipment Currently Used at Home none   Do you have any problems affording any of your prescribed medications? No   Is the patient taking medications as prescribed? yes   Does the patient have transportation home? Yes   Transportation Available family or friend will provide   Does the patient receive services at the [facility] Clinic? No   Discharge Plan [facility]hab  [facility]   Discharge Plan B Home with family;Home Health   Patient/Family In Agreement With Plan yes         Discharge/ My Health Packet Folder Given to patient/family:     yes       PCP:  Princess JAE Boothe MD        Pharmacy:    [email or web address] [id number]  [address] [address] AT [address] [place] [address] [address]  [address] [place] [address]  [facility]  [place]  [place] [address]  Phone: [phone] Fax: [phone]          Emergency Contacts:  Extended Emergency Contact Information  Primary Emergency Contact: Joe Watson  Home Phone: [phone]  Relation: Spouse  Secondary Emergency Contact: Mayuri Bartlett  Address: [address] [place] [address] [facility]  Home Phone: [phone]  Relation: Daughter    Insurance:  Payor: MEDICARE / Plan: MEDICARE PART A & B / Product Type: [employer] /     Mai Love RN, CCRN-K, [facility]  Neuro-Critical Care   X [phone]

## 2018-11-15 NOTE — NURSING
Patient arrived to [facility] from ED by stretcher and ED RN stroke/diagnosis:   ICH    TPA start and end time (if applicable)    Thrombectomy start and end time (if applicable)    Current symptoms:  Right sided weakness, slurred speech    Skin assessment done: Y/N  Wounds noted:     NCC notified: MD Serrano

## 2018-11-15 NOTE — ASSESSMENT & PLAN NOTE
-11/14/18 CT head w/ 1.8 cm x 1.5 cm L BG hemorrhage, mild surrounding edema, no midline shift  -Vascular Neurology following, appreciate recs  -NSGY following, appreciate recs  -Suspect hypertensive hemorrhage  -Goal SBP < 140, on nicardipine gtt.  Will place arterial line, resume lisinopril.  -Follow up CT head w/o contrast 6 hrs after original per NSGY recs  -Hold VTE ppx, place SCDs  -Admit to Neuro ICU w/ q1h neuro checks    11/15: repeat imaging stable    Resp: breathing on RA stable    CVS: MAP> 65 and SBP <160 goal  On cardene titrable  Started amlodipine  Discontinued lisinopril due to kidney dysfunction    GI: eating, on bowel regimen    Kidneys: avoid nephrotoxic drugs.                 Monitor I/O  Heme: stable  ID: no issues

## 2018-11-15 NOTE — ASSESSMENT & PLAN NOTE
73 year old male with an unknown PMHx due to lack of preventative care appts, who presents to [facility] [place] a [place] from [facility] after being found to have a left BG hemorrhage.    -Patient neurologically stable on exam.   -No neurosurgical intervention currently indicated  -Continue maximum medical management per NCC and Stroke  -Repeat head CT head stable, no intraventricular blood  -HTN:  Maintain SBP < 160.   -Hold all anti coagulation medications  -NSGY will sign off, please call with any questions of concerns

## 2018-11-15 NOTE — SUBJECTIVE & OBJECTIVE
Interval History:   Neuro: L BG ICH stable on repeat imaging,.   CVS: MAP> 65, SBP <160. On cardene. Discontinued lisinopril, started amlodipine  Pulm: on RA  KidneyS: dysfunction. Monitor urine output, avoid nephrotoxins     Review of Systems   HENT: Negative for trouble swallowing and voice change.    Eyes: Negative for photophobia and visual disturbance.   Respiratory: Negative for apnea, chest tightness and shortness of breath.    Cardiovascular: Negative for chest pain, palpitations and leg swelling.   Gastrointestinal: Negative for diarrhea, nausea, rectal pain and vomiting.   Genitourinary: Negative for difficulty urinating.   Neurological: Positive for speech difficulty and weakness.   Psychiatric/Behavioral: Negative.      Objective:     Vitals:  Temp: 98.1 °F (36.7 °C)  Pulse: 65  Rhythm: normal sinus rhythm  BP: (!) 147/51  MAP (mmHg): 81  Resp: 13  SpO2: 99 %  O2 Device (Oxygen Therapy): room air    Temp  Min: 97.7 °F (36.5 °C)  Max: 98.4 °F (36.9 °C)  Pulse  Min: 55  Max: 86  BP  Min: 111/59  Max: 163/77  MAP (mmHg)  Min: 80  Max: 110  Resp  Min: 12  Max: 28  SpO2  Min: 98 %  Max: 100 %    11/14 0701 - 11/15 0700  In: 1150 [I.V.:1150]  Out: -    Unmeasured Output  Urine Occurrence: 1       Physical Exam   Constitutional: He appears well-developed and well-nourished.   HENT:   Head: Normocephalic.   Eyes: EOM are normal. Pupils are equal, round, and reactive to light.   Cardiovascular: Normal rate and regular rhythm.   Pulmonary/Chest: Effort normal and breath sounds normal.   Abdominal: Soft.   Neurological: He is alert. He displays normal reflexes. No cranial nerve deficit. Coordination normal.   Oriented to self. Not oriented to date. States his mother who is 62 y (patient is 73 y) will visit    intact  Motor:  Normal bulk and tone. LUE, LLE 5/5 throughout.  RUE shoulder abduction 4-/5, biceps/triceps 4-/5,  strength 4/5.  RLE hip flexors 3/5, knee flexion/extension 4-/5,  plantarflexion/dorsiflexion 4-/5.  Sensory:  Intact to light touch at all extremities and face without inattention.   Reflexes:  symmetric 2+  Coordination: FTN intact        Medications:  Continuous  niCARdipine Last Rate: 7.5 mg/hr (11/15/18 1100)   Scheduled  amLODIPine 10 mg Daily   heparin (porcine) 5,000 Units Q8H   senna-docusate 8.6-50 mg 1 tablet Daily   sodium chloride 0.9% 3 mL Q8H   PRN  dextrose 50% 12.5 g PRN   glucagon (human recombinant) 1 mg PRN   hydrALAZINE 10 mg Q4H PRN   insulin aspart U-100 1-10 Units QID (AC + HS) PRN     Today I personally reviewed pertinent medications, lines/drains/airways, imaging, cardiology results, laboratory results, microbiology results, notably:    Diet  Diet Adult Regular (IDDSI Level 7)  Diet Adult Regular (IDDSI Level 7)

## 2018-11-15 NOTE — PT/OT/SLP EVAL
Speech Language Pathology Evaluation  Cognitive/Bedside Swallow    Patient Name:  Nikolas Callaway   MRN:  [id number]  Admitting Diagnosis: Nontraumatic subcortical hemorrhage of left cerebral hemisphere    Recommendations:                  General Recommendations:  Dysphagia therapy and Speech/language therapy  Diet recommendations:  Mechanical soft, Thin   Aspiration Precautions: 1 bite/sip at a time, Alternating bites/sips, Check for pocketing/oral residue, HOB to 90 degrees, Meds whole 1 at a time, Small bites/sips and Standard aspiration precautions   General Precautions: Standard, aspiration  Communication strategies:  none    History:     Past Medical History:   Diagnosis Date    Hypertension     Nontraumatic subcortical hemorrhage of left cerebral hemisphere 11/14/2018       Past Surgical History:   Procedure Laterality Date    REDUCTION-CLOSED Right 4/6/2015    Performed by Jos Surgeon at [facility]       Social History: Patient lives with spouse    Prior diet: regular solids and thin liquids     Occupation/hobbies/homemaking: independent with all ADLs    Subjective     Pt awake and pleasant     Pain/Comfort:  Pain Rating 1: 0/10  Pain Rating Post-Intervention 1: 0/10    Objective:     Cognitive Status:    AAOx4, memory, reasoning skills,  problem solving skills, safety awareness, compare/contrast, sequincing skills , organization skill,  all WFL      Receptive Language:   Comprehension:   WFL    Pragmatics:    WFL     Expressive Language:  Verbal language skills were wfl with no evidence of aphasia.  Pt. Expressed their thoughts coherently in conversation with no evidence of confusion or word finding deficits      Motor Speech:  Dysarthria  significant right sided weakness and orofacial droop     Voice:   WFL    Visual-Spatial:  not overt deficits observed ongoing assessment warranted     Reading:   not yet assessed      Written Expression:   Dominant hand: Right with overt weakness Pt unable to participate in  writing tasks at this time     Oral Musculature Evaluation  Oral Musculature: general weakness, facial asymmetry present, gross asymmetry present, right weakness  Dentition: scattered dentition  Secretion Management: right corner drooling  Oral Labial Strength and Mobility: impaired retraction, impaired pursing, impaired seal  Lingual Strength and Mobility: WFL    Bedside Swallow Eval:   Consistencies Assessed:  · Thin liquids 4oz via single sips from open cup and straw  · Puree 4oz via full tsp   · Solids x3     Oral Phase:   · WFL  · Pocketing   Right  cleared with tongue sweep and liqiud wash   · Oral residue  · Lingual residue   · Mild oral loss of secretions and thin liquids on right side   · Discussed down grading diet to softer solids while speech service continue to address underlying dysarthrai     Pharyngeal Phase:   · no overt clinical signs/symptoms of aspiration  · no overt clinical signs/symptoms of pharyngeal dysphagia      Treatment:  Education provided to Pt re: SLP role in acute care setting, overall impressions and therapeutic goals. Whiteboard updated.    Assessment:     Nikolas Callaway is a 73 y.o. male with an SLP diagnosis of Dysphagia and Dysarthria.      Goals:   Multidisciplinary Problems     SLP Goals        Problem: SLP Goal    Goal Priority Disciplines Outcome   SLP Goal     SLP    Description:  Speech Language Pathology Goals  Goals expected to be met by 11/22      Pt will tolerate diet of  thin liquids and mechanical soft solids, solids] without overt clinical signs of aspiration   Pt will participate in trials of regular solids within speech therapy sessions to help determine least restrictive diet   Pt will complete OMEs x10 w/ SLP model to enhance oral motor function   Pt will participate in ongoing assessment of speech language and cognitive linguistic skills to help rule out deficits and determine therapeutic plan of care                         Plan:     · Patient to be seen:  4  x/week   · Plan of Care expires:  12/14/18  · Plan of Care reviewed with:      · SLP Follow-Up:  Yes       Discharge recommendations:  Discharge Facility/Level Of Care Needs: rehabilitation facility   Barriers to Discharge:  Level of Skilled Assistance Needed      Time Tracking:     SLP Treatment Date:   11/15/18  Speech Start Time:  0924  Speech Stop Time:  0943     Speech Total Time (min):  19 min    Billable Minutes: Eval 10  and Eval Swallow and Oral Function 9    Bethany Irwin CCC-SLP  11/15/2018

## 2018-11-15 NOTE — PLAN OF CARE
Problem: Patient Care Overview  Goal: Plan of Care Review  Outcome: Revised  POC reviewed with pt at 1400. Pt verbalized understanding. Questions and concerns addressed. No acute events today. Pt progressing toward goals.  Neuro status remains unchanged.  Pt sat in bedside chair while working with PT.  Cardene gtt titrated for SBP <160. Will continue to monitor. See flowsheets for full assessment and VS info.

## 2018-11-15 NOTE — PLAN OF CARE
Problem: Physical Therapy Goal  Goal: Physical Therapy Goal  Outcome: Ongoing (interventions implemented as appropriate)  Initial eval completed.  Results, POC, and therapy recommendations discussed with patient.   Complete evaluation documentation to follow.    Mobility Recommendations: 2 person assist transfer to chair  D/c recommendations: Rehab     Ani High, PT  11/15/2018  [phone] (pager)
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
## 2018-11-15 NOTE — PLAN OF CARE
Problem: SLP Goal  Goal: SLP Goal  Pt seen for clinical swallow assessment. Pt with blatant facial droop and weakness on right side. Pt appearing safe for diet of thin liquids and mechanical soft solids. Speech to continue to follow for dysarthria.     Bethany Irwin MS, CCC-SLP  Speech Language Pathologist  Pager: [phone]  Date 11/15/2018

## 2018-11-15 NOTE — PLAN OF CARE
Problem: Occupational Therapy Goal  Goal: Occupational Therapy Goal  OT evaluation completed.  JAIRO Tellez  11/15/2018
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
## 2018-11-15 NOTE — PT/OT/SLP EVAL
"     Physical Therapy Evaluation     Patient Name: Nikolas Callaway  MRN: [id number]   Diagnosis: Nontraumatic subcortical hemorrhage of left cerebral hemisphere    Recommendations:   Discharge Recommendations:  rehabilitation facility   Discharge Equipment Recommendations: (TBD)   Barriers to Discharge: decreased caregiver assistance, increased caregiver burden, fall risk    Plan:   During this hospitalization, patient will be seen 5 x/week for gait training, therapeutic activities, therapeutic exercises, neuromuscular re-education to address impairments and functional mobility deficits.   · Plan of Care Expires: 12/15/18   Plan of Care Reviewed with: patient    This plan of care has been discussed with the patient and/or family who were involved in its development and are in agreement with the identified goals and treatment plan.     History:     Past Medical History:   Diagnosis Date    Hypertension     Nontraumatic subcortical hemorrhage of left cerebral hemisphere 11/14/2018       Past Surgical History:   Procedure Laterality Date    REDUCTION-CLOSED Right 4/6/2015    Performed by Jos Surgeon at [facility]       Subjective   Patient comments/goals: "I see cars and trucks out side. It's a nice day"  Pain/Comfort:  ·  Pain Rating 1: 0/10  · Pain Rating Post-Intervention 1: 0/10     Living Environment:  Home: The patient lives in [place] with his wife in a 1 story home with no steps to enter. (Confirm with family as this differs from earlier report)  PLOF:  Independent with mobility without an assistive device  DME owned: none.    Assistance Available: Upon discharge, patient will have assistance from wife (retired).    Objective:   The patient had arterial line, bed alarm, pulse ox (continuous), blood pressure cuff, telemetry, SCD, peripheral IV    General Precautions: aspiration, fall  Recent Surgery: * No surgery found *    Recent Vital Signs: (Last documented)  Temp: 98.5 °F (36.9 °C) (10/03/18 1102)  Pulse: " (!) 115 (10/03/18 1302)  Resp: 20 (10/03/18 1302)  BP: (!) 147/70 (10/03/18 1302)  SpO2: 98 % (10/03/18 1302)     Physical Examination:   The patient was found supine in ICU in NAD. He agreed to therapy.  He participated in eval, sitting balance EOB and transfer to/from the chair.     Cognitive Function:  - Oriented to: person, place and situation(verbal cues)  - Level of Alertness: awake and alert  - Follows Commands/attention: Follows one-step commands  - Communication: clear/fluent  - Safety awareness/insight to disability: impaired  Musculoskeletal System  Facial droop: R facial droop  Upper Extremities:   ROM: PROM WFL; AROM limited by spastic hemiparesis  Strength: RUE spastic hemiparesis, decreased L shoulder flexion   Lower Extremities:  ROM: WFL  Strength:  Muscle Group R LE L LE Comments   Hip ext 2-/5 5/5    Hip flexion  2-/5 4/5       Knee flexion  2-/5 5/5       Knee ext. 2-/5 5/5    Ankle DF 1+/5 5/5    Ankle PF 2-/5 5/5    - Tone/Spasticity: (Modified Cris Scale)    RUE extensor tone MAS 3   RLE extensor tone MAS 3  Neuromuscular System:  · Sensation: impaired to LT RLE; absent RUE   · Coordination: R impaired dt hemiparesis   Finger to thumb opposition: intact L   Finger to nose: intact L   Heel to shin: NT  Posture and gross symmetry: R lean in sitting    BALANCE:  Sitting: moderate assistance initially but progressed to min assistance with repositioning; lateral instability   · Time: 15 minutes  · Posture: R lean with ineffective righting reactions; RUE support improved his balance   · Comments:   · L UE decreased active shoulder flexion therefore he could not reach for rail to prevent R sided LOB  · Awareness of LOB but unable to recover  · Cocontraction of UEs and LEs when attempting to recover balance  Standing: max assistance with PT blocking R knee, preventing R LOB, and maintaining anterior weight shift over feet    FUNCTIONAL MOBILITY ASSESSMENT:  Bed Mobility: performed with HOB  flat  · Rolling/Turning R: min assistance   · Rolling/Turning L: moderate assistance: to position LLE in hooklying, verbal cues for technique and min assistance to complete roll  · Supine > sit: max assistance from L sidelying with good use of LUE to elevate trunk into sitting  · Sit > supine: max assistance into L sidelying  · Scooting EOB: total assistance using draw sheet    Transfers:  · Sit <> stand transfer: max assistance from bed and chair; PT blocked R knee, supported RUE, prevented lateral instability, and facilitated/assisted anterior weight shift over feet   · Bed <> chair transfer: max assistance stand pivot transfer     Gait:   Total assistance; patient unable to take steps during transfer to chair.     Therapeutic Activities, Education, or Exercises:  The therapist educated the patient on the role of PT, POC, and therapy recommendations of rehab.  The therapist discussed the patients current mobility status, deficits, and level of assistance with patient. Time was provided for active listening, discussion of health disposition, and discussion of safe discharge recommendations. Therapist answered questions to patient/familys satisfaction within scope of practice.      FUNCTIONAL OUTCOME MEASURES:  [facility]  Turning over in bed (including adjusting bedclothes, sheets and blankets)?: 2  Sitting down on and standing up from a chair with arms (e.g., wheelchair, bedside commode, etc.): 2  Moving from lying on back to sitting on the side of the bed?: 2  Moving to and from a bed to a chair (including a wheelchair)?: 2  Need to walk in hospital room?: 1  Climbing 3-5 steps with a railing?: 1  Basic Mobility Total Score: 10    Goals:     Multidisciplinary Problems     Physical Therapy Goals        Problem: Physical Therapy Goal    Goal Priority Disciplines Outcome Goal Variances Interventions   Physical Therapy Goal     PT, PT/OT Ongoing (interventions implemented as appropriate)     Description:    Goals to be  met by 11/25    1. Pt will perform rolling to the R and L with SBA.   2. Pt will perform supine to sit from both sides of the bed with min assistance.  3. Pt will perform sit to supine with min assistance.  4. Pt will perform sit to stand transfers with moderate assistance.    5. Pt will perform bed <> chair transfers with moderate assistance.  6. Pt will perform gait x 10 feet with max assistance.  7. Pt will sit EOB x 5 minutes with SBA and no LOB while performing dynamic UE tasks to prepare for functional tasks in sitting.                     Assessment:   Nikolas Callaway is a 73 y.o. male admitted with a medical diagnosis of Nontraumatic subcortical hemorrhage of left cerebral hemisphere.  Prior to admit he was independent with mobility.  he now presents with the following impairments/functional limitations: weakness, gait instability, decreased upper extremity function, impaired cardiopulmonary response to activity, impaired endurance, impaired balance, decreased lower extremity function, impaired coordination, impaired sensation, impaired fine motor, impaired self care skills, impaired cognition, impaired functional mobilty, abnormal tone.   Due to these impairments, he now requires skilled assistance from a therapist to safely perform OOB mobility.  He requires max assistance for standing and transfers, moderate assistance for balance, and has abnormal muscle tone and cocontraction which will affect his performance of mobility tasks.  Pt would benefit from skilled PT services in an inpatient rehab facility to maximize his functional independence.       Problem List:  weakness, gait instability, decreased upper extremity function, impaired cardiopulmonary response to activity, impaired endurance, impaired balance, decreased lower extremity function, impaired coordination, impaired sensation, impaired fine motor, impaired self care skills, impaired cognition, impaired functional mobilty, abnormal tone  Rehab  Prognosis:  good.  The patient would benefit from acute skilled PT services to address these deficits and maximize their functional independence.    Clinical Decision Making:   The patient presents with 4 or more elements from any of the following: body structures and functions, activity limitations, and/or participation restrictions per standardized tests and measures.  The patient's current clinical presentation is considered evolving with changing characteristics d/t medical acuity and/or medical stability in the acute care setting.   Evaluation Complexity:  Moderate - 84643     Time Tracking:   PT Received On:  11/15/18  PT Start Time:   1140    PT Stop Time:  1211  PT Total Time (min): 31 min      Billable Minutes: Evaluation 20 and Neuromuscular Re-education 11    Ani High, PT  10/02/2018  [phone] (pager)

## 2018-11-15 NOTE — PLAN OF CARE
11/15/18 1540   Post-Acute Status   Post-Acute Authorization Placement   Post-Acute Placement Status Referrals Sent  ([facility])       Mai Love RN, CCRN-K, [facility]  Neuro-Critical Care   [phone]

## 2018-11-16 PROBLEM — G93.5 BRAIN COMPRESSION: Status: ACTIVE | Noted: 2018-11-16

## 2018-11-16 PROBLEM — F17.200 CURRENT SMOKER: Status: ACTIVE | Noted: 2018-11-16

## 2018-11-16 PROBLEM — G93.6 VASOGENIC CEREBRAL EDEMA: Status: ACTIVE | Noted: 2018-11-16

## 2018-11-16 LAB
ALBUMIN SERPL BCP-MCNC: 2.8 G/DL
ALP SERPL-CCNC: 61 U/L
ALT SERPL W/O P-5'-P-CCNC: 10 U/L
ANION GAP SERPL CALC-SCNC: 6 MMOL/L
AST SERPL-CCNC: 19 U/L
BASOPHILS # BLD AUTO: 0.02 K/UL
BASOPHILS NFR BLD: 0.4 %
BILIRUB SERPL-MCNC: 0.5 MG/DL
BUN SERPL-MCNC: 29 MG/DL
CALCIUM SERPL-MCNC: 8.5 MG/DL
CHLORIDE SERPL-SCNC: 114 MMOL/L
CO2 SERPL-SCNC: 22 MMOL/L
CREAT SERPL-MCNC: 1.7 MG/DL
DIFFERENTIAL METHOD: ABNORMAL
EOSINOPHIL # BLD AUTO: 0.1 K/UL
EOSINOPHIL NFR BLD: 0.9 %
ERYTHROCYTE [DISTWIDTH] IN BLOOD BY AUTOMATED COUNT: 14.4 %
EST. GFR  (AFRICAN AMERICAN): 45.6 ML/MIN/1.73 M^2
EST. GFR  (NON AFRICAN AMERICAN): 39.4 ML/MIN/1.73 M^2
GLUCOSE SERPL-MCNC: 91 MG/DL
HCT VFR BLD AUTO: 33.1 %
HGB BLD-MCNC: 10.3 G/DL
IMM GRANULOCYTES # BLD AUTO: 0.01 K/UL
IMM GRANULOCYTES NFR BLD AUTO: 0.2 %
LYMPHOCYTES # BLD AUTO: 2.2 K/UL
LYMPHOCYTES NFR BLD: 39.4 %
MAGNESIUM SERPL-MCNC: 1.9 MG/DL
MCH RBC QN AUTO: 27.3 PG
MCHC RBC AUTO-ENTMCNC: 31.1 G/DL
MCV RBC AUTO: 88 FL
MONOCYTES # BLD AUTO: 0.5 K/UL
MONOCYTES NFR BLD: 8.8 %
NEUTROPHILS # BLD AUTO: 2.8 K/UL
NEUTROPHILS NFR BLD: 50.3 %
NRBC BLD-RTO: 0 /100 WBC
PHOSPHATE SERPL-MCNC: 3.9 MG/DL
PLATELET # BLD AUTO: 219 K/UL
PMV BLD AUTO: 10.9 FL
POCT GLUCOSE: 101 MG/DL (ref 70–110)
POCT GLUCOSE: 114 MG/DL (ref 70–110)
POCT GLUCOSE: 136 MG/DL (ref 70–110)
POCT GLUCOSE: 241 MG/DL (ref 70–110)
POCT GLUCOSE: 89 MG/DL (ref 70–110)
POCT GLUCOSE: 96 MG/DL (ref 70–110)
POTASSIUM SERPL-SCNC: 4.3 MMOL/L
PROT SERPL-MCNC: 6.3 G/DL
RBC # BLD AUTO: 3.77 M/UL
SODIUM SERPL-SCNC: 142 MMOL/L
WBC # BLD AUTO: 5.58 K/UL

## 2018-11-16 PROCEDURE — 83735 ASSAY OF MAGNESIUM: CPT

## 2018-11-16 PROCEDURE — 97112 NEUROMUSCULAR REEDUCATION: CPT

## 2018-11-16 PROCEDURE — S4991 NICOTINE PATCH NONLEGEND: HCPCS | Performed by: NURSE PRACTITIONER

## 2018-11-16 PROCEDURE — A4216 STERILE WATER/SALINE, 10 ML: HCPCS | Performed by: STUDENT IN AN ORGANIZED HEALTH CARE EDUCATION/TRAINING PROGRAM

## 2018-11-16 PROCEDURE — 99291 CRITICAL CARE FIRST HOUR: CPT | Mod: GC,,, | Performed by: ANESTHESIOLOGY

## 2018-11-16 PROCEDURE — 63600175 PHARM REV CODE 636 W HCPCS: Performed by: NURSE PRACTITIONER

## 2018-11-16 PROCEDURE — 84100 ASSAY OF PHOSPHORUS: CPT

## 2018-11-16 PROCEDURE — 92507 TX SP LANG VOICE COMM INDIV: CPT

## 2018-11-16 PROCEDURE — 85025 COMPLETE CBC W/AUTO DIFF WBC: CPT

## 2018-11-16 PROCEDURE — 25000003 PHARM REV CODE 250: Performed by: STUDENT IN AN ORGANIZED HEALTH CARE EDUCATION/TRAINING PROGRAM

## 2018-11-16 PROCEDURE — 25000003 PHARM REV CODE 250: Performed by: PHYSICIAN ASSISTANT

## 2018-11-16 PROCEDURE — 80053 COMPREHEN METABOLIC PANEL: CPT

## 2018-11-16 PROCEDURE — 25000003 PHARM REV CODE 250: Performed by: NURSE PRACTITIONER

## 2018-11-16 PROCEDURE — 20000000 HC ICU ROOM

## 2018-11-16 PROCEDURE — 97530 THERAPEUTIC ACTIVITIES: CPT

## 2018-11-16 PROCEDURE — 97535 SELF CARE MNGMENT TRAINING: CPT

## 2018-11-16 PROCEDURE — 63600175 PHARM REV CODE 636 W HCPCS: Performed by: PSYCHIATRY & NEUROLOGY

## 2018-11-16 RX ORDER — CARVEDILOL 3.12 MG/1
3.12 TABLET ORAL 2 TIMES DAILY
Status: DISCONTINUED | OUTPATIENT
Start: 2018-11-16 | End: 2018-11-16

## 2018-11-16 RX ORDER — IBUPROFEN 200 MG
1 TABLET ORAL DAILY
Status: DISCONTINUED | OUTPATIENT
Start: 2018-11-16 | End: 2018-11-23 | Stop reason: HOSPADM

## 2018-11-16 RX ORDER — SODIUM CHLORIDE 9 MG/ML
INJECTION, SOLUTION INTRAVENOUS CONTINUOUS
Status: DISCONTINUED | OUTPATIENT
Start: 2018-11-16 | End: 2018-11-17

## 2018-11-16 RX ORDER — CARVEDILOL 12.5 MG/1
12.5 TABLET ORAL 2 TIMES DAILY
Status: DISCONTINUED | OUTPATIENT
Start: 2018-11-16 | End: 2018-11-17

## 2018-11-16 RX ADMIN — Medication 3 ML: at 02:11

## 2018-11-16 RX ADMIN — NICOTINE 1 PATCH: 21 PATCH, EXTENDED RELEASE TRANSDERMAL at 10:11

## 2018-11-16 RX ADMIN — HEPARIN SODIUM 5000 UNITS: 5000 INJECTION, SOLUTION INTRAVENOUS; SUBCUTANEOUS at 05:11

## 2018-11-16 RX ADMIN — SODIUM CHLORIDE 500 ML: 0.9 INJECTION, SOLUTION INTRAVENOUS at 10:11

## 2018-11-16 RX ADMIN — HYDRALAZINE HYDROCHLORIDE 10 MG: 20 INJECTION INTRAMUSCULAR; INTRAVENOUS at 05:11

## 2018-11-16 RX ADMIN — CARVEDILOL 3.12 MG: 3.12 TABLET, FILM COATED ORAL at 10:11

## 2018-11-16 RX ADMIN — STANDARDIZED SENNA CONCENTRATE AND DOCUSATE SODIUM 1 TABLET: 8.6; 5 TABLET, FILM COATED ORAL at 08:11

## 2018-11-16 RX ADMIN — HEPARIN SODIUM 5000 UNITS: 5000 INJECTION, SOLUTION INTRAVENOUS; SUBCUTANEOUS at 09:11

## 2018-11-16 RX ADMIN — SODIUM CHLORIDE: 0.9 INJECTION, SOLUTION INTRAVENOUS at 05:11

## 2018-11-16 RX ADMIN — NICARDIPINE HYDROCHLORIDE 7.5 MG/HR: 0.2 INJECTION, SOLUTION INTRAVENOUS at 08:11

## 2018-11-16 RX ADMIN — Medication 3 ML: at 05:11

## 2018-11-16 RX ADMIN — INSULIN ASPART 4 UNITS: 100 INJECTION, SOLUTION INTRAVENOUS; SUBCUTANEOUS at 11:11

## 2018-11-16 RX ADMIN — CARVEDILOL 12.5 MG: 12.5 TABLET, FILM COATED ORAL at 09:11

## 2018-11-16 RX ADMIN — NICARDIPINE HYDROCHLORIDE 7.5 MG/HR: 0.2 INJECTION, SOLUTION INTRAVENOUS at 03:11

## 2018-11-16 RX ADMIN — AMLODIPINE BESYLATE 10 MG: 10 TABLET ORAL at 08:11

## 2018-11-16 RX ADMIN — SODIUM CHLORIDE: 0.9 INJECTION, SOLUTION INTRAVENOUS at 10:11

## 2018-11-16 RX ADMIN — HEPARIN SODIUM 5000 UNITS: 5000 INJECTION, SOLUTION INTRAVENOUS; SUBCUTANEOUS at 02:11

## 2018-11-16 RX ADMIN — NICARDIPINE HYDROCHLORIDE 10 MG/HR: 0.2 INJECTION, SOLUTION INTRAVENOUS at 07:11

## 2018-11-16 RX ADMIN — NICARDIPINE HYDROCHLORIDE 7.5 MG/HR: 0.2 INJECTION, SOLUTION INTRAVENOUS at 01:11

## 2018-11-16 RX ADMIN — Medication 3 ML: at 09:11

## 2018-11-16 NOTE — PLAN OF CARE
Problem: Patient Care Overview  Goal: Plan of Care Review  POC reviewed with pt at 0530. Pt verbalized understanding. Cardene continued. Pt bathed overnight. Questions and concerns addressed. No acute events overnight. Pt progressing toward goals. Will continue to monitor. See flowsheets for full assessment and VS info

## 2018-11-16 NOTE — PT/OT/SLP PROGRESS
"Occupational Therapy   Treatment    Name: Nikolas Callaway  MRN: [id number]  Admitting Diagnosis:  Nontraumatic subcortical hemorrhage of left cerebral hemisphere       Recommendations:     Discharge Recommendations: rehabilitation facility  Discharge Equipment Recommendations:  3-in-1 commode, bath bench  Barriers to discharge:  Inaccessible home environment, Decreased caregiver support    Subjective   Patient:  "It's 3000."  Pain/Comfort:  · Pain Rating 1: 10/10  · Location 1: (right shoulder (Chronic, per patient report))  · Pain Addressed 1: Reposition, Nurse notified  · Pain Rating Post-Intervention 1: 10/10  Pain reported as "10" at rest and with mobility.  Objective:     Communicated with: Nurse prior to session.  Patient found with all lines intact, call button in reach and bed alarm on and arterial line, bed alarm, blood pressure cuff, Condom Catheter, SCD, telemetry, peripheral IV, pulse ox (continuous) upon OT entry to room.  Family not present.  General Precautions: Standard, aspiration, fall   Orthopedic Precautions:N/A   Braces: N/A     Occupational Performance:    Bed Mobility:    · Patient completed Rolling/Turning to Left with  stand by assistance  · Patient completed Rolling/Turning to Right with minimum assistance  · Patient completed Scooting/Bridging with moderate assistance  · Patient completed Supine to Sit with moderate assistance  · Patient completed Sit to Supine with maximal assistance     Functional Mobility/Transfers:  · Patient completed Sit <> Stand Transfer with maximal assistance  with  no assistive device   · Patient completed Bed <> Chair Transfer using Stand Pivot technique with Max-total assist with no assistive device    Activities of Daily Living:  · Grooming: maximal assistance while seated EOB  · Upper Body Dressing: total assistance while seated EOB  · Lower Body Dressing: total assistance while seated EOB      [facility] 6 Click ADL: 10    Treatment & Education:  Patient education " provided for stroke warning signs, prevention guidelines and personal risk factors.  Patient education provided on role of OT and need for rehab upon discharge.    Continued education, patient/ family training recommended.  Daily orientation provided.  AAROM performed right UE/LEs one set x 10 rep in all planes of motion with stretches provided at end range; sustained stretch provided for ankle dorsiflexion, external rotation/ shoulder flexion within patient's pain tolerance.  Assistance and facilitation provided for upward rotation of the scapula during shoulder flexion and abduction.    Patient alert and oriented x person and place.  Able to follow 4/4 one step commands.  Patient attentive and interactive throughout the session.  Oral motor ex. performed while seated.  Mod-max assist required with postural control while seated EOB.  Poor recall of information noted throughout the session and poor right/left discrimination.  Positioning provided for midline orientation with bilateral UEs elevated and heels lifted off mattress.   Family not present during the session.  Patient's functional status and disposition recommendation discussed with patient and patient's nurse.  White board updated in patient's room.  OT asked if there were any other questions; patient/ family had no further questions    Patient left supine with all lines intact, call button in reach and bed alarm on  Education:    Assessment:     Nikolas Callaway is a 73 y.o. male with a medical diagnosis of Nontraumatic subcortical hemorrhage of left cerebral hemisphere.  He presents with the following performance deficits affecting function are weakness, impaired endurance, impaired sensation, gait instability, impaired cognition, decreased lower extremity function, decreased upper extremity function, impaired functional mobilty, impaired self care skills, impaired balance, visual deficits, decreased coordination, decreased safety awareness, decreased ROM,  impaired coordination, abnormal tone, pain, impaired fine motor.     Rehab Prognosis:  Good; patient would benefit from acute skilled OT services to address these deficits and reach maximum level of function.       Plan:     Patient to be seen 4 x/week to address the above listed problems via self-care/home management, neuromuscular re-education, therapeutic activities, cognitive retraining, sensory integration, therapeutic exercises  · Plan of Care Expires: 12/13/18  · Plan of Care Reviewed with: patient    This Plan of care has been discussed with the patient who was involved in its development and understands and is in agreement with the identified goals and treatment plan    GOALS:   Multidisciplinary Problems     Occupational Therapy Goals        Problem: Occupational Therapy Goal    Goal Priority Disciplines Outcome Interventions   Occupational Therapy Goal     OT, PT/OT     Description:  Goals set 11/15 to be addressed for 7 days with expiration date, 11/22:  Patient will increase functional independence with ADLs by performing:    Patient will demonstrate rolling to the right with modified independence.  Not met   Patient will demonstrate rolling to the left with CGA.   Not met  Patient will demonstrate supine -sit with min assist.   Not met  Patient will demonstrate stand pivot transfers with min assist.   Not met  Patient will demonstrate grooming while standing with mod assist.   Not met  Patient will demonstrate upper body dressing with mod assist while seated EOB.   Not met  Patient will demonstrate lower body dressing with mod assist while seated EOB.   Not met  Patient will demonstrate toileting with mod assist.   Not met  Patient will demonstrate bathing while seated EOB with mod assist.   Not met  Patient's family / caregiver will demonstrate independence and safety with assisting patient with self-care skills and functional mobility.     Not met  Patient's family / caregiver will demonstrate  independence with providing ROM and changes in bed positioning.   Not met  Patient and/or patient's family will verbalize understanding of stroke prevention guidelines, personal risk factors and stroke warning signs via teachback method.  Not met                           Time Tracking:     OT Date of Treatment: 11/16/18  OT Start Time: 0449  OT Stop Time: 0530  OT Total Time (min): 41 min    Billable Minutes:Self Care/Home Management 26  Neuromuscular Re-education 15    JAIRO Tellez  11/16/2018

## 2018-11-16 NOTE — PLAN OF CARE
Problem: Patient Care Overview  Goal: Plan of Care Review  Outcome: Ongoing (interventions implemented as appropriate)  POC reviewed with pt at 1400. Pt verbalized understanding. Questions and concerns addressed. No acute events today. Pt progressing toward goals. Will continue to monitor. See flowsheets for full assessment and VS info.

## 2018-11-16 NOTE — PLAN OF CARE
Problem: Physical Therapy Goal  Goal: Physical Therapy Goal    Goals to be met by 11/25    1. Pt will perform rolling to the R and L with SBA.   2. Pt will perform supine to sit from both sides of the bed with min assistance.  3. Pt will perform sit to supine with min assistance.  4. Pt will perform sit to stand transfers with moderate assistance.    5. Pt will perform bed <> chair transfers with moderate assistance.  6. Pt will perform gait x 10 feet with max assistance.  7. Pt will sit EOB x 5 minutes with SBA and no LOB while performing dynamic UE tasks to prepare for functional tasks in sitting.      Outcome: Ongoing (interventions implemented as appropriate)  Patient participated well in therapy.  POC and goals remain appropriate.  Please refer to the progress note for functional mobility.     Pt is safe to perform transfers with nursing using max assistance for stand pivot, block L knee.      Ani High, PT  11/16/2018  [phone] (pager)

## 2018-11-16 NOTE — PLAN OF CARE
Problem: SLP Goal  Goal: SLP Goal  Speech Language Pathology Goals  Goals expected to be met by 11/22      Pt will tolerate diet of  thin liquids and mechanical soft solids, solids] without overt clinical signs of aspiration   Pt will participate in trials of regular solids within speech therapy sessions to help determine least restrictive diet   Pt will complete OMEs x10 w/ SLP model to enhance oral motor function   Pt will participate in ongoing assessment of speech language and cognitive linguistic skills to help rule out deficits and determine therapeutic plan of care        Pt with good progress towards goals     Bethany Irwin MS, CCC-SLP  Speech Language Pathologist  Pager: [phone]  Date 11/16/2018

## 2018-11-16 NOTE — PT/OT/SLP PROGRESS
"Speech Language Pathology Treatment    Patient Name:  Nikolas Callaway   MRN:  [id number]  Admitting Diagnosis: Nontraumatic subcortical hemorrhage of left cerebral hemisphere    Recommendations:                 General Recommendations:  Dysphagia therapy and Speech/language therapy  Diet recommendations:  Mechanical soft, Liquid Diet Level: Thin   Aspiration Precautions: 1 bite/sip at a time, Check for pocketing/oral residue, Feed only when awake/alert, HOB to 90 degrees and Meds crushed in puree   General Precautions: Standard, aspiration  Communication strategies:  none    Subjective   "scuse me I need my sisters number" Pt attempted in to make phone call upon arrival     Pain/Comfort:  · Pain Rating 1: 0/10  · Pain Rating Post-Intervention 1: 0/10    Objective:     Has the patient been evaluated by SLP for swallowing?   Yes  Keep patient NPO? No   Current Respiratory Status:    Room air    Pt remains with slurred speech and decreased intelligibility at the conversation level. SLP reviewed with Pt dysarthria compensatory strategies and pt able to implement at the phrase level with SLP model and 100% speech intelligibility.  SLP introduced oral motor exercises. SLP modeled each posture and pt completed x5 with good ability. Labial retraction remains the most difficulty at this time. SLP provided written home exercise program for oral motor exercises and discussed with pt importance to continue to perform postures 2-3 daily outside of schedule speech therapy sessions. Pt demonstrated understanding and agreement with plan. Pt deferring PO trials at this time and reports adequate tolerance of current diet. Pt to remain on mechanical soft solids and thin liquids.  Speech to continue to follow.     Assessment:     Nikolas Callaway is a 73 y.o. male with an SLP diagnosis of Dysarthria.     Goals:   Multidisciplinary Problems     SLP Goals        Problem: SLP Goal    Goal Priority Disciplines Outcome   SLP Goal     SLP  "   Description:  Speech Language Pathology Goals  Goals expected to be met by 11/22      Pt will tolerate diet of  thin liquids and mechanical soft solids, solids] without overt clinical signs of aspiration   Pt will participate in trials of regular solids within speech therapy sessions to help determine least restrictive diet   Pt will complete OMEs x10 w/ SLP model to enhance oral motor function   Pt will participate in ongoing assessment of speech language and cognitive linguistic skills to help rule out deficits and determine therapeutic plan of care                         Plan:     · Patient to be seen:  3 x/week   · Plan of Care expires:  12/14/18  · Plan of Care reviewed with:      · SLP Follow-Up:  Yes       Discharge recommendations:  rehabilitation facility   Barriers to Discharge:  Level of Skilled Assistance Needed      Time Tracking:     SLP Treatment Date:   11/16/18  Speech Start Time:  1119  Speech Stop Time:  1136     Speech Total Time (min):  17 min    Billable Minutes: Speech Therapy Individual 17    Bethany Irwin CCC-SLP  11/16/2018

## 2018-11-16 NOTE — PROGRESS NOTES
ICU Progress Note  Neurocritical Care    Admit Date: 11/14/2018  LOS: 2    CC: Nontraumatic subcortical hemorrhage of left cerebral hemisphere    Code Status: Full Code     SUBJECTIVE:     Interval History/Significant Events:   Nontraumatic subcortical hemorrhage left cerebral hemisphere  Brain compression-poa  Vasogenic cerebral edema-poa  Essential hypertension-poa  atn-poa  Hypovolemia-poa  Hypokalemia-poa  Heavy smoker-current   Malignant hypertension requiring acute medical management    Medications:  Continuous Infusions:   sodium chloride 0.9%      niCARdipine 7.5 mg/hr (11/16/18 0900)     Scheduled Meds:   amLODIPine  10 mg Oral Daily    heparin (porcine)  5,000 Units Subcutaneous Q8H    senna-docusate 8.6-50 mg  1 tablet Oral Daily    sodium chloride 0.9% bolus  500 mL Intravenous Once    sodium chloride 0.9%  3 mL Intravenous Q8H     PRN Meds:.dextrose 50%, glucagon (human recombinant), hydrALAZINE, insulin aspart U-100    OBJECTIVE:   Vital Signs (Most Recent):   Temp: 98.1 °F (36.7 °C) (11/16/18 0700)  Pulse: 73 (11/16/18 0900)  Resp: 14 (11/16/18 0900)  BP: 139/65 (11/16/18 0900)  SpO2: 100 % (11/16/18 0900)    Vital Signs (24h Range):   Temp:  [98.1 °F (36.7 °C)-99.4 °F (37.4 °C)] 98.1 °F (36.7 °C)  Pulse:  [65-83] 73  Resp:  [13-24] 14  SpO2:  [97 %-100 %] 100 %  BP: (122-153)/(51-81) 139/65  Arterial Line BP: (100-162)/(47-64) 151/53    ICP/CPP (Last 24h):        I & O (Last 24h):     Intake/Output Summary (Last 24 hours) at 11/16/2018 1014  Last data filed at 11/16/2018 0900  Gross per 24 hour   Intake 1375.22 ml   Output 900 ml   Net 475.22 ml     Physical Exam:  GA: Alert, comfortable, no acute distress.   HEENT: No scleral icterus or JVD.   Pulmonary: Clear to auscultation A/P/L. No wheezing, crackles, or rhonchi.  Cardiac: RRR S1 & S2 w/o rubs/murmurs/gallops.   Abdominal: Bowel sounds present x 4. No appreciable hepatosplenomegaly.  Skin: No jaundice, rashes, or visible  lesions.  Neuro:    Awake  Alert  ox3  No focal deficit         Lines/Drains/Airway:            Arterial Line 11/14/18 1930 Right Radial (Active)   Site Assessment Clean;Dry;Intact 11/16/2018  7:00 AM   Line Status Pulsatile blood flow 11/16/2018  7:00 AM   Art Line Waveform Appropriate;Square wave test performed 11/16/2018  7:00 AM   Arterial Line Interventions Zeroed and calibrated;Leveled;Connections checked and tightened;Flushed per protocol;Line pulled back 11/16/2018  7:00 AM   Color/Movement/Sensation Capillary refill less than 3 sec 11/16/2018  7:00 AM   Dressing Type Transparent 11/16/2018  7:00 AM   Dressing Status Biopatch in place;Clean;Dry;Intact 11/16/2018  7:00 AM   Dressing Intervention Dressing reinforced 11/16/2018  7:00 AM   Dressing Change Due 11/21/18 11/16/2018  7:00 AM      Male External Urinary Catheter 11/15/18 1500 Small (Active)   Collection Container Urimeter 11/16/2018  7:00 AM   Securement Method secured to top of thigh w/ adhesive device 11/16/2018  7:00 AM   Skin no redness;no breakdown 11/16/2018  7:00 AM   Tolerance no signs/symptoms of discomfort 11/16/2018  7:00 AM   Output (mL) 125 mL 11/16/2018  8:00 AM   Catheter Change Date 11/15/18 11/16/2018  7:00 AM   Catheter Change Time 1905 11/16/2018  7:00 AM     Nutrition/Tube Feeds (if NPO state why): po    Labs:  ABG: No results for input(s): PH, PO2, PCO2, HCO3, POCSATURATED, BE in the last 24 hours.  BMP:  Recent Labs   Lab 11/16/18  0122      K 4.3   *   CO2 22*   BUN 29*   CREATININE 1.7*   GLU 91   MG 1.9   PHOS 3.9     LFT:   Lab Results   Component Value Date    AST 19 11/16/2018    ALT 10 11/16/2018    ALKPHOS 61 11/16/2018    BILITOT 0.5 11/16/2018    ALBUMIN 2.8 (L) 11/16/2018    PROT 6.3 11/16/2018     CBC:   Lab Results   Component Value Date    WBC 5.58 11/16/2018    HGB 10.3 (L) 11/16/2018    HCT 33.1 (L) 11/16/2018    MCV 88 11/16/2018     11/16/2018     Microbiology x 7d:   Microbiology Results  (last 7 days)     ** No results found for the last 168 hours. **        Imaging:  usg renal     I personally reviewed the above image.    ASSESSMENT/PLAN:     Active Hospital Problems    Diagnosis    *Nontraumatic subcortical hemorrhage of left cerebral hemisphere    Vasogenic cerebral edema    Brain compression    Current smoker    Intracranial hemorrhage    Stage 3 chronic kidney disease     GFR 58 on admission 11/14/18      Hypertension    Dysarthria    Right sided weakness            Plan:  [facility] renal  Follow UO  Wean cardene  Follow exam  b blocker    Critical secondary to Patient has a condition that poses threat to life and bodily function: wean cardene/follow exam/follow uo    Total cc time 35 mins     Critical care was time spent personally by me on the following activities: development of treatment plan with patient or surrogate and bedside caregivers, discussions with consultants, evaluation of patient's response to treatment, examination of patient, ordering and performing treatments and interventions, ordering and review of laboratory studies, ordering and review of radiographic studies, pulse oximetry, re-evaluation of patient's condition. This critical care time did not overlap with that of any other provider or involve time for any procedures.

## 2018-11-16 NOTE — PT/OT/SLP PROGRESS
"     Physical Therapy Treatment    Patient Name: Nikolas Callaway  MRN: [id number]   Diagnosis: Nontraumatic subcortical hemorrhage of left cerebral hemisphere    Recommendations:     Discharge Recommendations:  rehabilitation facility   Discharge Equipment Recommendations: (TBD)   Barriers to Discharge: decreased caregiver assistance, fall risk    Plan:   During this hospitalization, the patient will be seen 5 x/week for gait training, therapeutic activities, therapeutic exercises, neuromuscular re-education to address impairments and functional mobility deficits.   · Plan of Care Expires: 12/15/18   Plan of Care Reviewed with: patient    This plan of care has been discussed with the patient and/or family who were involved in its development and are in agreement with the identified goals and treatment plan.     Subjective   PT communicated with RN prior to therapy.     Patient comments/goals: "whatever is best."  Pain/Comfort:  · Pain Rating 1: 0/10  · Pain Rating Post-Intervention 1: 0/10    Recent Vital Signs: (Last documentation)  Temp: 98.9 °F (37.2 °C) (11/16/18 1500)  Pulse: 66 (11/16/18 1500)  Resp: 13 (11/16/18 1500)  BP: (!) 144/73 (11/16/18 1500)  SpO2: 98 % (11/16/18 1500)     Objective:   General Precautions: aspiration, fall, aphasia  Recent Surgery: * No surgery found *    The patient currently has arterial line, pulse ox (continuous), telemetry, blood pressure cuff, peripheral IV, SCD, pressure relief boots.    The patient was found supine in ICU in [facility].  He agreed to therapy. He participated in bed mobility, sitting balance, transfers, and standing.  See below for details.     Functional Mobility: rehab tech (Ewa) assisted with mobility  Bed Mobility:   · Rolling Right: NT  · Rolling Left: moderate assistance  · Supine to Sit: max assistance from L sidelying d/t decreased pushing with LUE (medical lines interfering with hand placement)  · Sit to Supine: max assistance into L sidelying     Sitting " Balance at Edge of Bed:  · Assistance Level Required: max assistance for initial sitting balance, improved to min assistance for static sitting only  · Time: 15 minutes  · Postural deviations noted: posterior and R sided LOB; decreased awareness of LOB, decreased righting reactions  · PT Encouraged/facilitated: midline orientation, neutral sitting balance, postural awareness, mirror feedback.  · Comments/Activities:  · Static sitting balance   · Hand over hand assistance for bilateral UE weight bearing to improve balance  · Active anteroposterior exploration of limits of stability to improve balance  · Therapist mirrored feedback for postural awareness and return to midline      Transfers:   · Sit <> Stand Transfer:  4x, max assistance with PT blocking R knee, assisting with lift, assisting anterior weight shift, facilitating R hip extension, verbal cues for erect posture and fwd gaze;  PT tech provided line management, LUE HHA, and safety     Gait:  3-4 lateral steps to the L with max assistance  - patient actively moved BLEs (sliding across floor instead of picking feet up)  - PT blocked R knee and gave max assistance for erect posture during task    Therapeutic Activities, Education, or Exercises:  PT instructed patient in heel slides with return demonstration to improve RLE movement.  Co-activation noted throughout bed mobility and transfers.  He was instructed in heel slides 10x/ea as HEP until next PT session.     The patient was left supine in bed with all lines intact, call bell in reach, RN present.     FUNCTIONAL OUTCOME MEASURES:  Turning over in bed (including adjusting bedclothes, sheets and blankets)?: 2  Sitting down on and standing up from a chair with arms (e.g., wheelchair, bedside commode, etc.): 2  Moving from lying on back to sitting on the side of the bed?: 2  Moving to and from a bed to a chair (including a wheelchair)?: 2  Need to walk in hospital room?: 1  Climbing 3-5 steps with a railing?:  1  Basic Mobility Total Score: 10    Goals:     Multidisciplinary Problems     Physical Therapy Goals        Problem: Physical Therapy Goal    Goal Priority Disciplines Outcome Goal Variances Interventions   Physical Therapy Goal     PT, PT/OT Ongoing (interventions implemented as appropriate)     Description:    Goals to be met by 11/25    1. Pt will perform rolling to the R and L with SBA.   2. Pt will perform supine to sit from both sides of the bed with min assistance.  3. Pt will perform sit to supine with min assistance.  4. Pt will perform sit to stand transfers with moderate assistance.    5. Pt will perform bed <> chair transfers with moderate assistance.  6. Pt will perform gait x 10 feet with max assistance.  7. Pt will sit EOB x 5 minutes with SBA and no LOB while performing dynamic UE tasks to prepare for functional tasks in sitting.                     Assessment:   Nikolas Callaway is a 73 y.o. male admitted with a medical diagnosis of Nontraumatic subcortical hemorrhage of left cerebral hemisphere.  He participated well in therapy.  He has poor postural awareness in sitting and standing.  He is unable to perceive LOB in sitting but actively made postural adjustments when provided with visual or mirrored feedback.  His ease of movement during mobility is inhibited d/t co-activation or co-contraction of opposing muscle groups.  He is expected to meet his mobility goals in inpatient rehab.       Problems/Impairments:  weakness, gait instability, decreased upper extremity function, impaired cardiopulmonary response to activity, impaired endurance, impaired balance, decreased lower extremity function, impaired coordination, impaired sensation, visual deficits, decreased safety awareness, impaired fine motor, impaired self care skills, impaired cognition, impaired functional mobilty, decreased coordination, abnormal tone     Rehab Prognosis:  good. The patient would benefit from acute skilled PT services to  address these deficits and maximize their functional independence.     Time Tracking:     PT Received On:  11/16/18  PT Start Time:   1515    PT Stop Time:  1540  PT Total Time (min): 25 min     Billable Minutes: Therapeutic Activity 15 and Neuromuscular Re-education 10     Ani High, PT  11/16/2018  [phone] (pager)

## 2018-11-17 LAB
ALBUMIN SERPL BCP-MCNC: 2.6 G/DL
ALP SERPL-CCNC: 61 U/L
ALT SERPL W/O P-5'-P-CCNC: 11 U/L
ANION GAP SERPL CALC-SCNC: 7 MMOL/L
AST SERPL-CCNC: 19 U/L
BASOPHILS # BLD AUTO: 0.02 K/UL
BASOPHILS NFR BLD: 0.4 %
BILIRUB SERPL-MCNC: 0.5 MG/DL
BUN SERPL-MCNC: 25 MG/DL
CALCIUM SERPL-MCNC: 8.5 MG/DL
CHLORIDE SERPL-SCNC: 113 MMOL/L
CO2 SERPL-SCNC: 21 MMOL/L
CREAT SERPL-MCNC: 1.4 MG/DL
DIFFERENTIAL METHOD: ABNORMAL
EOSINOPHIL # BLD AUTO: 0.1 K/UL
EOSINOPHIL NFR BLD: 1.2 %
ERYTHROCYTE [DISTWIDTH] IN BLOOD BY AUTOMATED COUNT: 14.5 %
EST. GFR  (AFRICAN AMERICAN): 57.6 ML/MIN/1.73 M^2
EST. GFR  (NON AFRICAN AMERICAN): 49.8 ML/MIN/1.73 M^2
GLUCOSE SERPL-MCNC: 115 MG/DL
HCT VFR BLD AUTO: 32.8 %
HGB BLD-MCNC: 10.3 G/DL
IMM GRANULOCYTES # BLD AUTO: 0.01 K/UL
IMM GRANULOCYTES NFR BLD AUTO: 0.2 %
LYMPHOCYTES # BLD AUTO: 2.2 K/UL
LYMPHOCYTES NFR BLD: 38.2 %
MAGNESIUM SERPL-MCNC: 1.7 MG/DL
MCH RBC QN AUTO: 27.5 PG
MCHC RBC AUTO-ENTMCNC: 31.4 G/DL
MCV RBC AUTO: 88 FL
MONOCYTES # BLD AUTO: 0.5 K/UL
MONOCYTES NFR BLD: 9.5 %
NEUTROPHILS # BLD AUTO: 2.9 K/UL
NEUTROPHILS NFR BLD: 50.5 %
NRBC BLD-RTO: 0 /100 WBC
PHOSPHATE SERPL-MCNC: 3.9 MG/DL
PLATELET # BLD AUTO: 193 K/UL
PMV BLD AUTO: 10.5 FL
POCT GLUCOSE: 102 MG/DL (ref 70–110)
POCT GLUCOSE: 74 MG/DL (ref 70–110)
POCT GLUCOSE: 77 MG/DL (ref 70–110)
POCT GLUCOSE: 82 MG/DL (ref 70–110)
POCT GLUCOSE: 91 MG/DL (ref 70–110)
POCT GLUCOSE: 94 MG/DL (ref 70–110)
POTASSIUM SERPL-SCNC: 4.2 MMOL/L
PROT SERPL-MCNC: 6 G/DL
RBC # BLD AUTO: 3.74 M/UL
SODIUM SERPL-SCNC: 141 MMOL/L
WBC # BLD AUTO: 5.71 K/UL

## 2018-11-17 PROCEDURE — 20000000 HC ICU ROOM

## 2018-11-17 PROCEDURE — 84100 ASSAY OF PHOSPHORUS: CPT

## 2018-11-17 PROCEDURE — 63600175 PHARM REV CODE 636 W HCPCS: Performed by: NURSE PRACTITIONER

## 2018-11-17 PROCEDURE — 25000003 PHARM REV CODE 250: Performed by: NURSE PRACTITIONER

## 2018-11-17 PROCEDURE — 25000003 PHARM REV CODE 250: Performed by: PHYSICIAN ASSISTANT

## 2018-11-17 PROCEDURE — A4216 STERILE WATER/SALINE, 10 ML: HCPCS | Performed by: STUDENT IN AN ORGANIZED HEALTH CARE EDUCATION/TRAINING PROGRAM

## 2018-11-17 PROCEDURE — 83735 ASSAY OF MAGNESIUM: CPT

## 2018-11-17 PROCEDURE — 25000003 PHARM REV CODE 250: Performed by: STUDENT IN AN ORGANIZED HEALTH CARE EDUCATION/TRAINING PROGRAM

## 2018-11-17 PROCEDURE — S4991 NICOTINE PATCH NONLEGEND: HCPCS | Performed by: NURSE PRACTITIONER

## 2018-11-17 PROCEDURE — 99233 SBSQ HOSP IP/OBS HIGH 50: CPT | Mod: GC,,, | Performed by: PSYCHIATRY & NEUROLOGY

## 2018-11-17 PROCEDURE — 85025 COMPLETE CBC W/AUTO DIFF WBC: CPT

## 2018-11-17 PROCEDURE — 80053 COMPREHEN METABOLIC PANEL: CPT

## 2018-11-17 PROCEDURE — 94761 N-INVAS EAR/PLS OXIMETRY MLT: CPT

## 2018-11-17 RX ORDER — HYDRALAZINE HYDROCHLORIDE 25 MG/1
25 TABLET, FILM COATED ORAL ONCE
Status: COMPLETED | OUTPATIENT
Start: 2018-11-17 | End: 2018-11-17

## 2018-11-17 RX ORDER — HYDRALAZINE HYDROCHLORIDE 20 MG/ML
10 INJECTION INTRAMUSCULAR; INTRAVENOUS
Status: DISCONTINUED | OUTPATIENT
Start: 2018-11-17 | End: 2018-11-23 | Stop reason: HOSPADM

## 2018-11-17 RX ORDER — HYDRALAZINE HYDROCHLORIDE 50 MG/1
50 TABLET, FILM COATED ORAL EVERY 8 HOURS
Status: DISCONTINUED | OUTPATIENT
Start: 2018-11-17 | End: 2018-11-17

## 2018-11-17 RX ORDER — CARVEDILOL 12.5 MG/1
12.5 TABLET ORAL ONCE
Status: COMPLETED | OUTPATIENT
Start: 2018-11-17 | End: 2018-11-17

## 2018-11-17 RX ORDER — CARVEDILOL 25 MG/1
25 TABLET ORAL 2 TIMES DAILY
Status: DISCONTINUED | OUTPATIENT
Start: 2018-11-17 | End: 2018-11-23 | Stop reason: HOSPADM

## 2018-11-17 RX ADMIN — HEPARIN SODIUM 5000 UNITS: 5000 INJECTION, SOLUTION INTRAVENOUS; SUBCUTANEOUS at 05:11

## 2018-11-17 RX ADMIN — CARVEDILOL 12.5 MG: 12.5 TABLET, FILM COATED ORAL at 08:11

## 2018-11-17 RX ADMIN — AMLODIPINE BESYLATE 10 MG: 10 TABLET ORAL at 08:11

## 2018-11-17 RX ADMIN — HEPARIN SODIUM 5000 UNITS: 5000 INJECTION, SOLUTION INTRAVENOUS; SUBCUTANEOUS at 09:11

## 2018-11-17 RX ADMIN — CARVEDILOL 25 MG: 25 TABLET, FILM COATED ORAL at 09:11

## 2018-11-17 RX ADMIN — HYDRALAZINE HYDROCHLORIDE 10 MG: 20 INJECTION INTRAMUSCULAR; INTRAVENOUS at 07:11

## 2018-11-17 RX ADMIN — HYDRALAZINE HYDROCHLORIDE 75 MG: 50 TABLET ORAL at 09:11

## 2018-11-17 RX ADMIN — STANDARDIZED SENNA CONCENTRATE AND DOCUSATE SODIUM 1 TABLET: 8.6; 5 TABLET, FILM COATED ORAL at 08:11

## 2018-11-17 RX ADMIN — HYDRALAZINE HYDROCHLORIDE 25 MG: 25 TABLET ORAL at 04:11

## 2018-11-17 RX ADMIN — NICOTINE 1 PATCH: 21 PATCH, EXTENDED RELEASE TRANSDERMAL at 09:11

## 2018-11-17 RX ADMIN — Medication 3 ML: at 01:11

## 2018-11-17 RX ADMIN — Medication 3 ML: at 05:11

## 2018-11-17 RX ADMIN — HYDRALAZINE HYDROCHLORIDE 50 MG: 50 TABLET ORAL at 01:11

## 2018-11-17 RX ADMIN — CARVEDILOL 12.5 MG: 12.5 TABLET, FILM COATED ORAL at 06:11

## 2018-11-17 RX ADMIN — Medication 3 ML: at 09:11

## 2018-11-17 RX ADMIN — HEPARIN SODIUM 5000 UNITS: 5000 INJECTION, SOLUTION INTRAVENOUS; SUBCUTANEOUS at 01:11

## 2018-11-17 RX ADMIN — NICARDIPINE HYDROCHLORIDE 2.5 MG/HR: 0.2 INJECTION, SOLUTION INTRAVENOUS at 02:11

## 2018-11-17 NOTE — PLAN OF CARE
Problem: Patient Care Overview  Goal: Plan of Care Review  Outcome: Ongoing (interventions implemented as appropriate)  POC reviewed with pt at 1430. Pt verbalized understanding. Questions and concerns addressed. No acute events today. Cardene and NS d/c. Pt progressing toward goals. Will continue to monitor. See flowsheets for full assessment and VS info.

## 2018-11-17 NOTE — PLAN OF CARE
Problem: Patient Care Overview  Goal: Plan of Care Review  POC reviewed with pt at 0530. Pt verbalized understanding. Questions and concerns addressed. No acute events overnight. Cardene and NS still infusing  Pt progressing toward goals. Will continue to monitor. See flowsheets for full assessment and VS info

## 2018-11-17 NOTE — PROGRESS NOTES
"[facility]  Neurocritical Care  Progress Note    Admit Date: 11/14/2018  Service Date: 11/17/2018  Length of Stay: 3    Subjective:     Chief Complaint: Nontraumatic subcortical hemorrhage of left cerebral hemisphere    History of Present Illness: 72 yo M with PMHx HTN and tobacco use presents to [facility] 11/14/18 due to acute onset of dizziness.  No fall, head trauma, or loss of consciousness.  Went to [facility] where he began to notice RUE, RLE weakness.  ED staff noted facial droop and slurring, BP on admission up to 224/102.  At that point, patient was oriented and told ED staff that he hadn't been to a doctor in many years. Notes lisinopril as only medication he takes.  Had no headache, dizziness, n/v, vision changes, or neck pain at the time.  He is seen in [facility] ED ~ 14:45 and has some disorientation (states he is 67 yo, lives with "60 year old grand-daughter").  CT head on admission notable for ~ 1.8 x 1.5 cm L BG hemorrhage without significant midline shift, but with small amount of surrounding edema.  Possible R cerebellar infarct also noted.  Currently requiring nicardipine, NSGY and Vascular Neurology following.    Hospital Course: 11/14/18:  Admission to Neuro ICU 2/2 L BG ICH, suspect hypertensive in etiology.  11/15/18: CT stable on repeat. Discontinue lisinopril due to recent kidney insult.start amlodipine. Started sq heparin     Interval History:  No events overnight. Renal US consistent with medical kidney disease. Patient continues on cardene for BP control,    Review of Systems   HENT: Negative for trouble swallowing and voice change.    Eyes: Negative for photophobia and visual disturbance.   Respiratory: Negative for apnea, chest tightness and shortness of breath.    Cardiovascular: Negative for chest pain, palpitations and leg swelling.   Gastrointestinal: Negative for diarrhea, nausea, rectal pain and vomiting.   Genitourinary: Negative for difficulty urinating. "   Neurological: Positive for speech difficulty and weakness.   Psychiatric/Behavioral: Negative.      Objective:     Vitals:  Temp: 99.1 °F (37.3 °C)  Pulse: 67  Rhythm: normal sinus rhythm  BP: 137/63  MAP (mmHg): 91  Resp: 13  SpO2: 99 %  O2 Device (Oxygen Therapy): room air    Temp  Min: 98 °F (36.7 °C)  Max: 99.1 °F (37.3 °C)  Pulse  Min: 59  Max: 81  BP  Min: 137/63  Max: 164/77  MAP (mmHg)  Min: 90  Max: 112  Resp  Min: 12  Max: 31  SpO2  Min: 96 %  Max: 100 %    11/16 0701 - 11/17 0700  In: 2944.6 [P.O.:240; I.V.:2204.6]  Out: 2305 [Urine:2305]   Unmeasured Output  Urine Occurrence: 0  Stool Occurrence: 1  Pad Count: 2       Physical Exam   Constitutional: He appears well-developed and well-nourished.   HENT:   Head: Normocephalic.   Eyes: EOM are normal. Pupils are equal, round, and reactive to light.   Cardiovascular: Normal rate and regular rhythm.   Pulmonary/Chest: Effort normal and breath sounds normal.   Abdominal: Soft.   Neurological: He is alert. He displays normal reflexes. No cranial nerve deficit. Coordination normal.   Oriented to person and place. States year is 1816. Follows commands. Patient globally weak. No focal deficits.       Medications:  Continuous  sodium chloride 0.9% Last Rate: 75 mL/hr at 11/17/18 1000   niCARdipine Last Rate: 10 mg/hr (11/17/18 1031)   Scheduled  amLODIPine 10 mg Daily   carvedilol 12.5 mg BID   heparin (porcine) 5,000 Units Q8H   nicotine 1 patch Daily   senna-docusate 8.6-50 mg 1 tablet Daily   sodium chloride 0.9% 3 mL Q8H   PRN  dextrose 50% 12.5 g PRN   glucagon (human recombinant) 1 mg PRN   hydrALAZINE 10 mg Q4H PRN   insulin aspart U-100 1-10 Units QID (AC + HS) PRN     Today I personally reviewed pertinent medications, lines/drains/airways, imaging, laboratory results, notably:    Diet  Diet Dysphagia Mechanical Soft (IDDSI Level 5) [facility]  Diet Dysphagia Mechanical Soft (IDDSI Level 5) [facility]    Recent Labs   Lab 11/17/18  0135   WBC  5.71   RBC 3.74*   HGB 10.3*   HCT 32.8*      MCV 88   MCH 27.5   MCHC 31.4*     Recent Labs   Lab 11/17/18  0135   CALCIUM 8.5*   PROT 6.0      K 4.2   CO2 21*   *   BUN 25*   CREATININE 1.4   ALKPHOS 61   ALT 11   AST 19   BILITOT 0.5     No results for input(s): PT, INR, APTT in the last 24 hours.    Body mass index is 20.8 kg/m².    I have personally reviewed all labs, imaging, and studies today        Assessment/Plan:     Neuro   * Nontraumatic subcortical hemorrhage of left cerebral hemisphere    -11/14/18 CT head w/ 1.8 cm x 1.5 cm L BG hemorrhage, mild surrounding edema, no midline shift. 11/15: repeat imaging stable  -Vascular Neurology following, appreciate recs  -NSGY following, appreciate recs  -Suspect hypertensive hemorrhage  -Goal SBP < 160    Resp: breathing on RA stable    CVS: MAP> 65 and SBP <160 goal. Home HTN meds: hydralazine, coreg, norvasc  D/C cardine  Start hydralazine 50 TID, norvasc 10  Discontinued lisinopril due to kidney dysfunction    GI: eating, on bowel regimen    Kidneys: avoid nephrotoxic drugs.                 Monitor I/O. Cr stable. Renal US consistent with medical kidney disease  Heme: stable  ID: no issues     Cardiac/Vascular   Hypertension    Wean cardene.  See ICH     Renal/   Stage 3 chronic kidney disease    -Possibly 2/2 HTN  Discontinue lisinopril  Avoid nephrotoxins     Other   Right sided weakness    -2/2 L BG ICH, PT/OT consulted           The patient is being Prophylaxed for:  Venous Thromboembolism with: Mechanical or Chemical  Stress Ulcer with: Not Applicable   Ventilator Pneumonia with: not applicable    Activity Orders          None        Full Code    Zaheer Palacios MD  Neurocritical Care  [facility]

## 2018-11-17 NOTE — ASSESSMENT & PLAN NOTE
-11/14/18 CT head w/ 1.8 cm x 1.5 cm L BG hemorrhage, mild surrounding edema, no midline shift. 11/15: repeat imaging stable  -Vascular Neurology following, appreciate recs  -NSGY following, appreciate recs  -Suspect hypertensive hemorrhage  -Goal SBP < 160    Resp: breathing on RA stable    CVS: MAP> 65 and SBP <160 goal. Home HTN meds: hydralazine, coreg, norvasc  D/C cardine  Start hydralazine 50 TID, norvasc 10  Discontinued lisinopril due to kidney dysfunction    GI: eating, on bowel regimen    Kidneys: avoid nephrotoxic drugs.                 Monitor I/O. Cr stable. Renal US consistent with medical kidney disease  Heme: stable  ID: no issues

## 2018-11-17 NOTE — SUBJECTIVE & OBJECTIVE
Interval History:  No events overnight. Renal US consistent with medical kidney disease. Patient continues on cardene for BP control,    Review of Systems   HENT: Negative for trouble swallowing and voice change.    Eyes: Negative for photophobia and visual disturbance.   Respiratory: Negative for apnea, chest tightness and shortness of breath.    Cardiovascular: Negative for chest pain, palpitations and leg swelling.   Gastrointestinal: Negative for diarrhea, nausea, rectal pain and vomiting.   Genitourinary: Negative for difficulty urinating.   Neurological: Positive for speech difficulty and weakness.   Psychiatric/Behavioral: Negative.      Objective:     Vitals:  Temp: 99.1 °F (37.3 °C)  Pulse: 67  Rhythm: normal sinus rhythm  BP: 137/63  MAP (mmHg): 91  Resp: 13  SpO2: 99 %  O2 Device (Oxygen Therapy): room air    Temp  Min: 98 °F (36.7 °C)  Max: 99.1 °F (37.3 °C)  Pulse  Min: 59  Max: 81  BP  Min: 137/63  Max: 164/77  MAP (mmHg)  Min: 90  Max: 112  Resp  Min: 12  Max: 31  SpO2  Min: 96 %  Max: 100 %    11/16 0701 - 11/17 0700  In: 2944.6 [P.O.:240; I.V.:2204.6]  Out: 2305 [Urine:2305]   Unmeasured Output  Urine Occurrence: 0  Stool Occurrence: 1  Pad Count: 2       Physical Exam   Constitutional: He appears well-developed and well-nourished.   HENT:   Head: Normocephalic.   Eyes: EOM are normal. Pupils are equal, round, and reactive to light.   Cardiovascular: Normal rate and regular rhythm.   Pulmonary/Chest: Effort normal and breath sounds normal.   Abdominal: Soft.   Neurological: He is alert. He displays normal reflexes. No cranial nerve deficit. Coordination normal.   Oriented to person and place. States year is 1816. Follows commands. Patient globally weak. No focal deficits.       Medications:  Continuous  sodium chloride 0.9% Last Rate: 75 mL/hr at 11/17/18 1000   niCARdipine Last Rate: 10 mg/hr (11/17/18 1031)   Scheduled  amLODIPine 10 mg Daily   carvedilol 12.5 mg BID   heparin (porcine) 5,000  Units Q8H   nicotine 1 patch Daily   senna-docusate 8.6-50 mg 1 tablet Daily   sodium chloride 0.9% 3 mL Q8H   PRN  dextrose 50% 12.5 g PRN   glucagon (human recombinant) 1 mg PRN   hydrALAZINE 10 mg Q4H PRN   insulin aspart U-100 1-10 Units QID (AC + HS) PRN     Today I personally reviewed pertinent medications, lines/drains/airways, imaging, laboratory results, notably:    Diet  Diet Dysphagia Mechanical Soft (IDDSI Level 5) [facility]  Diet Dysphagia Mechanical Soft (IDDSI Level 5) [facility]    Recent Labs   Lab 11/17/18  0135   WBC 5.71   RBC 3.74*   HGB 10.3*   HCT 32.8*      MCV 88   MCH 27.5   MCHC 31.4*     Recent Labs   Lab 11/17/18  0135   CALCIUM 8.5*   PROT 6.0      K 4.2   CO2 21*   *   BUN 25*   CREATININE 1.4   ALKPHOS 61   ALT 11   AST 19   BILITOT 0.5     No results for input(s): PT, INR, APTT in the last 24 hours.    Body mass index is 20.8 kg/m².    I have personally reviewed all labs, imaging, and studies today

## 2018-11-18 LAB
ALBUMIN SERPL BCP-MCNC: 2.5 G/DL
ALP SERPL-CCNC: 63 U/L
ALT SERPL W/O P-5'-P-CCNC: 17 U/L
ANION GAP SERPL CALC-SCNC: 9 MMOL/L
AST SERPL-CCNC: 28 U/L
BASOPHILS # BLD AUTO: 0.01 K/UL
BASOPHILS NFR BLD: 0.2 %
BILIRUB SERPL-MCNC: 0.6 MG/DL
BUN SERPL-MCNC: 21 MG/DL
CALCIUM SERPL-MCNC: 8.9 MG/DL
CHLORIDE SERPL-SCNC: 108 MMOL/L
CO2 SERPL-SCNC: 20 MMOL/L
CREAT SERPL-MCNC: 1 MG/DL
DIFFERENTIAL METHOD: ABNORMAL
EOSINOPHIL # BLD AUTO: 0 K/UL
EOSINOPHIL NFR BLD: 0.8 %
ERYTHROCYTE [DISTWIDTH] IN BLOOD BY AUTOMATED COUNT: 14.2 %
EST. GFR  (AFRICAN AMERICAN): >60 ML/MIN/1.73 M^2
EST. GFR  (NON AFRICAN AMERICAN): >60 ML/MIN/1.73 M^2
GLUCOSE SERPL-MCNC: 90 MG/DL
HCT VFR BLD AUTO: 32.3 %
HGB BLD-MCNC: 10.2 G/DL
IMM GRANULOCYTES # BLD AUTO: 0.01 K/UL
IMM GRANULOCYTES NFR BLD AUTO: 0.2 %
LYMPHOCYTES # BLD AUTO: 1.8 K/UL
LYMPHOCYTES NFR BLD: 38.9 %
MAGNESIUM SERPL-MCNC: 1.8 MG/DL
MCH RBC QN AUTO: 27.3 PG
MCHC RBC AUTO-ENTMCNC: 31.6 G/DL
MCV RBC AUTO: 87 FL
MONOCYTES # BLD AUTO: 0.5 K/UL
MONOCYTES NFR BLD: 11 %
NEUTROPHILS # BLD AUTO: 2.3 K/UL
NEUTROPHILS NFR BLD: 48.9 %
NRBC BLD-RTO: 0 /100 WBC
PHOSPHATE SERPL-MCNC: 4.1 MG/DL
PLATELET # BLD AUTO: 194 K/UL
PMV BLD AUTO: 10.1 FL
POCT GLUCOSE: 111 MG/DL (ref 70–110)
POCT GLUCOSE: 77 MG/DL (ref 70–110)
POTASSIUM SERPL-SCNC: 4.4 MMOL/L
PROT SERPL-MCNC: 6.1 G/DL
RBC # BLD AUTO: 3.73 M/UL
SODIUM SERPL-SCNC: 137 MMOL/L
WBC # BLD AUTO: 4.71 K/UL

## 2018-11-18 PROCEDURE — 84100 ASSAY OF PHOSPHORUS: CPT

## 2018-11-18 PROCEDURE — A4216 STERILE WATER/SALINE, 10 ML: HCPCS | Performed by: STUDENT IN AN ORGANIZED HEALTH CARE EDUCATION/TRAINING PROGRAM

## 2018-11-18 PROCEDURE — 83735 ASSAY OF MAGNESIUM: CPT

## 2018-11-18 PROCEDURE — 99233 SBSQ HOSP IP/OBS HIGH 50: CPT | Mod: GC,,, | Performed by: PSYCHIATRY & NEUROLOGY

## 2018-11-18 PROCEDURE — 63600175 PHARM REV CODE 636 W HCPCS: Performed by: NURSE PRACTITIONER

## 2018-11-18 PROCEDURE — 85025 COMPLETE CBC W/AUTO DIFF WBC: CPT

## 2018-11-18 PROCEDURE — 94761 N-INVAS EAR/PLS OXIMETRY MLT: CPT

## 2018-11-18 PROCEDURE — 99232 SBSQ HOSP IP/OBS MODERATE 35: CPT | Mod: ,,, | Performed by: NURSE PRACTITIONER

## 2018-11-18 PROCEDURE — 25000003 PHARM REV CODE 250: Performed by: STUDENT IN AN ORGANIZED HEALTH CARE EDUCATION/TRAINING PROGRAM

## 2018-11-18 PROCEDURE — 20600001 HC STEP DOWN PRIVATE ROOM

## 2018-11-18 PROCEDURE — 80053 COMPREHEN METABOLIC PANEL: CPT

## 2018-11-18 PROCEDURE — 25000003 PHARM REV CODE 250: Performed by: NURSE PRACTITIONER

## 2018-11-18 PROCEDURE — S4991 NICOTINE PATCH NONLEGEND: HCPCS | Performed by: NURSE PRACTITIONER

## 2018-11-18 RX ADMIN — Medication 3 ML: at 02:11

## 2018-11-18 RX ADMIN — HEPARIN SODIUM 5000 UNITS: 5000 INJECTION, SOLUTION INTRAVENOUS; SUBCUTANEOUS at 10:11

## 2018-11-18 RX ADMIN — HEPARIN SODIUM 5000 UNITS: 5000 INJECTION, SOLUTION INTRAVENOUS; SUBCUTANEOUS at 05:11

## 2018-11-18 RX ADMIN — CARVEDILOL 25 MG: 25 TABLET, FILM COATED ORAL at 10:11

## 2018-11-18 RX ADMIN — HYDRALAZINE HYDROCHLORIDE 75 MG: 50 TABLET ORAL at 02:11

## 2018-11-18 RX ADMIN — CARVEDILOL 25 MG: 25 TABLET, FILM COATED ORAL at 08:11

## 2018-11-18 RX ADMIN — HEPARIN SODIUM 5000 UNITS: 5000 INJECTION, SOLUTION INTRAVENOUS; SUBCUTANEOUS at 02:11

## 2018-11-18 RX ADMIN — Medication 3 ML: at 10:11

## 2018-11-18 RX ADMIN — Medication 3 ML: at 05:11

## 2018-11-18 RX ADMIN — STANDARDIZED SENNA CONCENTRATE AND DOCUSATE SODIUM 1 TABLET: 8.6; 5 TABLET, FILM COATED ORAL at 08:11

## 2018-11-18 RX ADMIN — HYDRALAZINE HYDROCHLORIDE 75 MG: 50 TABLET ORAL at 10:11

## 2018-11-18 RX ADMIN — AMLODIPINE BESYLATE 10 MG: 10 TABLET ORAL at 08:11

## 2018-11-18 RX ADMIN — HYDRALAZINE HYDROCHLORIDE 75 MG: 50 TABLET ORAL at 05:11

## 2018-11-18 RX ADMIN — NICOTINE 1 PATCH: 21 PATCH, EXTENDED RELEASE TRANSDERMAL at 08:11

## 2018-11-18 NOTE — ASSESSMENT & PLAN NOTE
Stroke risk factor   SBP 220s on arrival  Required cardene  SBP <160 and at goal  Continue amlodipin 10, carvedilol 25, hydralazine 75

## 2018-11-18 NOTE — ASSESSMENT & PLAN NOTE
Nikolas Callaway is a 73 y.o. 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 He was transferred to [facility] for further care and admitted to neuro ICU. Etiology likely hypertensive ICH.       Antithrombotics for secondary stroke prevention: Antiplatelets: None: Intracerebral Hemorrhage    Statins for secondary stroke prevention and hyperlipidemia, if present:   Statins: None: Reason: ICH    Aggressive risk factor modification: HTN, Smoking     Rehab efforts: Occupational Therapy, PT/OT/SLP to evaluate and treat, PM&R consult  recommending inpatient rehab    Diagnostics ordered/pending: None     VTE prophylaxis: Heparin 5000 units SQ every 8 hours    BP parameters: ICH: SBP <160

## 2018-11-18 NOTE — PLAN OF CARE
Off nicardipine  Titrated oral antihypertensives.  D/c a-line  oob to chair as tolerated  Pt/ot  Transfer to [facility]

## 2018-11-18 NOTE — NURSING TRANSFER
Pt transferred via bed to room 701 with RN x2. Pt tolerated well. Beside neuro completed with DORA Munoz.

## 2018-11-18 NOTE — SUBJECTIVE & OBJECTIVE
"    Past Medical History:   Diagnosis Date    Hypertension     Nontraumatic subcortical hemorrhage of left cerebral hemisphere 11/14/2018     Past Surgical History:   Procedure Laterality Date    REDUCTION-CLOSED Right 4/6/2015    Performed by Carmen Surgeon at [facility]     History reviewed. No pertinent family history.  Social History     Tobacco Use    Smoking status: Current Every Day Smoker     Packs/day: 1.00     Types: Cigarettes     Start date: 1998    Smokeless tobacco: Never Used   Substance Use Topics    Alcohol use: No     Frequency: Never    Drug use: Yes     Types: "Crack" cocaine     Comment: last used 11/9/18     Review of patient's allergies indicates:  No Known Allergies    Medications: I have reviewed the current medication administration record.    Medications Prior to Admission   Medication Sig Dispense Refill Last Dose    lisinopril 10 MG tablet Take 10 mg by mouth once daily.   More than a month       Review of Systems   Constitutional: Negative for chills and fever.   Eyes: Negative for visual disturbance.   Respiratory: Negative for cough.    Cardiovascular: Negative for chest pain.   Gastrointestinal: Negative for nausea and vomiting.   Genitourinary: Negative for difficulty urinating.   Skin: Negative for rash.   Neurological: Positive for facial asymmetry, speech difficulty and weakness.     Objective:     Vital Signs (Most Recent):  Temp: 98.2 °F (36.8 °C) (11/18/18 1100)  Pulse: (!) 57 (11/18/18 1100)  Resp: 12 (11/18/18 1100)  BP: 138/73 (11/18/18 1100)  SpO2: 100 % (11/18/18 1100)    Vital Signs Range (Last 24H):  Temp:  [98.1 °F (36.7 °C)-98.8 °F (37.1 °C)]   Pulse:  [55-72]   Resp:  [9-24]   BP: (127-172)/(63-91)   SpO2:  [98 %-100 %]   Arterial Line BP: (127-166)/(52-68)     Physical Exam   Constitutional: He is oriented to person, place, and time. He appears well-developed and well-nourished.   HENT:   Head: Normocephalic and atraumatic.   Eyes: EOM are normal. Pupils are " equal, round, and reactive to light.   Cardiovascular: Bradycardia present.   Pulmonary/Chest: Effort normal. No respiratory distress.   Neurological: He is alert and oriented to person, place, and time.   Skin: Skin is warm and dry.   Vitals reviewed.      Neurological Exam:   LOC: alert  Attention Span: Good   Language: No aphasia  Articulation: Dysarthria  Orientation: Person, Place, Time   Visual Fields: Full  EOM (CN III, IV, VI): Full/intact  Pupils (CN II, III): PERRL  Facial Movement (CN VII): Lower facial weakness on the Right  Motor: Arm left  Normal 5/5  Leg left  Normal 5/5  Arm right  Paresis: 3/5  Leg right Paresis: 4/5  Sensation: Intact to light touch, temperature and vibration  Tone: Normal tone throughout      Laboratory:  CMP:   Recent Labs   Lab 11/18/18  0127   CALCIUM 8.9   ALBUMIN 2.5*   PROT 6.1      K 4.4   CO2 20*      BUN 21   CREATININE 1.0   ALKPHOS 63   ALT 17   AST 28   BILITOT 0.6     CBC:   Recent Labs   Lab 11/18/18  0127   WBC 4.71   RBC 3.73*   HGB 10.2*   HCT 32.3*      MCV 87   MCH 27.3   MCHC 31.6*     Lipid Panel:   Recent Labs   Lab 11/14/18  1103   CHOL 175   LDLCALC 98.6   HDL 62   TRIG 72     Coagulation:   Recent Labs   Lab 11/15/18  0100   INR 0.9   APTT 25.0     Hgb A1C:   Recent Labs   Lab 11/15/18  0100   HGBA1C 5.2     TSH:   Recent Labs   Lab 11/14/18  1103   TSH 1.222       Diagnostic Results:      Brain imaging:  CT Head. Date: 11/14/18 1424  1. Grossly stable focus of intracranial hemorrhage centered in the region of the left basal ganglia.  There is stable to subtly increased surrounding edema.  This lies adjacent to a previous infarct.  Correlation advised, continued follow-up recommended.  2. Stable sequela of chronic microvascular ischemic change and senescent change.  3. Nonspecific punctate focus of low attenuation within the right cerebellum, age-indeterminate infarct is a consideration.      CT Head. Date: 11/14/18 1050  1. Acute left  basal ganglia hemorrhage without midline shift or hydrocephalus.  Given the location of the hematoma likely etiologies is hypertensive hemorrhage.  2. Extensive periventricular white matter changes in the centrum from chronic microvascular ischemia.  This report was flagged in Epic as abnormal.      Vessel Imaging:  none    Cardiac Evaluation:   Echo. Date: 11/15/18  · Normal left ventricular systolic function. The estimated ejection fraction is 65%  · Concentric left ventricular remodeling.  · Normal LV diastolic function.  · No wall motion abnormalities.  · Normal right ventricular systolic function.  · Mild left atrial enlargement.  · Normal central venous pressure (3 mm Hg).  · No pericardial effusion.

## 2018-11-18 NOTE — PROGRESS NOTES
"[facility]  Neurocritical Care  Progress Note    Admit Date: 11/14/2018  Service Date: 11/18/2018  Length of Stay: 4    Subjective:     Chief Complaint: Nontraumatic subcortical hemorrhage of left cerebral hemisphere    History of Present Illness: 72 yo M with PMHx HTN and tobacco use [place] [facility] [place] due to acute onset of dizziness.  No fall, head trauma, or loss of consciousness.  Went to [facility] where he began to notice RUE, RLE weakness.  ED staff noted facial droop and slurring, BP on admission up to 224/102.  At that point, patient was oriented and told ED staff that he hadn't been to a doctor in many years. Notes lisinopril as only medication he takes.  Had no headache, dizziness, n/v, vision changes, or neck pain at the time.  [place] [facility] [place] ~ 14:45 and has some disorientation (states he is 67 yo, lives with "60 year old grand-daughter").  CT head on admission notable for ~ 1.8 x 1.5 cm L BG hemorrhage without significant midline shift, but with small amount of surrounding edema.  Possible R cerebellar infarct also noted.  Currently requiring nicardipine, NSGY and Vascular Neurology following.    Hospital Course: 11/14/18:  Admission to Neuro ICU 2/2 L BG ICH, suspect hypertensive in etiology.  11/15/18: CT stable on repeat. Discontinue lisinopril due to recent kidney insult.start amlodipine. Started sq heparin     No new subjective & objective note has been filed under this hospital service since the last note was generated.    Assessment/Plan:     Neuro   * Nontraumatic subcortical hemorrhage of left cerebral hemisphere    -11/14/18 CT head w/ 1.8 cm x 1.5 cm L BG hemorrhage, mild surrounding edema, no midline shift. 11/15: repeat imaging stable  -Vascular Neurology consulted, appreciate recs and will transfer today  -Suspect hypertensive hemorrhage  -Goal SBP < 160    Resp: breathing on RA stable    CVS: MAP> 65 and SBP <160 goal. Home HTN meds: hydralazine, " coreg, norvasc  D/C cardine  Start hydralazine 50 TID, norvasc 10  Discontinued lisinopril due to kidney dysfunction  Titrated oral antihypertensives.  D/c a-line          GI: eating, on bowel regimen    Kidneys: avoid nephrotoxic drugs.                 Monitor I/O. Cr stable. Renal US consistent with medical kidney disease  Heme: stable  ID: no issues    oob to chair as tolerated  Pt/ot     Dysarthria    -2/2 L BG ICH, SLP following     Cardiac/Vascular   Hypertension    cardene off  See ICH     Renal/   Stage 3 chronic kidney disease    -Possibly 2/2 HTN  Discontinue lisinopril  Avoid nephrotoxins     Other   Right sided weakness    -2/2 L BG ICH, PT/OT consulted             Activity Orders          Discontinue arterial line starting at 11/18 0600        Full Code    Rito Lazcano NP  Neurocritical Care  [facility]

## 2018-11-18 NOTE — ASSESSMENT & PLAN NOTE
-11/14/18 CT head w/ 1.8 cm x 1.5 cm L BG hemorrhage, mild surrounding edema, no midline shift. 11/15: repeat imaging stable  -Vascular Neurology consulted, appreciate recs and will transfer today  -Suspect hypertensive hemorrhage  -Goal SBP < 160    Resp: breathing on RA stable    CVS: MAP> 65 and SBP <160 goal. Home HTN meds: hydralazine, coreg, norvasc  D/C cardine  Start hydralazine 50 TID, norvasc 10  Discontinued lisinopril due to kidney dysfunction  Titrated oral antihypertensives.  D/c a-line          GI: eating, on bowel regimen    Kidneys: avoid nephrotoxic drugs.                 Monitor I/O. Cr stable. Renal US consistent with medical kidney disease  Heme: stable  ID: no issues    oob to chair as tolerated  Pt/ot

## 2018-11-18 NOTE — CONSULTS
[facility]  Vascular Neurology  Comprehensive Stroke Center  Consult Note    Inpatient consult to Vascular (Stroke) Neurology  Consult performed by: Annemarie Cadet PA-C  Consult ordered by: Rito Lazcano NP        Assessment/Plan:     Patient is a 73 y.o. year old male with:    * Nontraumatic subcortical hemorrhage of left cerebral hemisphere    Nikolas Callaway is a 73 y.o. 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 He was transferred to [facility] for further care and admitted to neuro ICU. Etiology likely hypertensive ICH.       Antithrombotics for secondary stroke prevention: Antiplatelets: None: Intracerebral Hemorrhage    Statins for secondary stroke prevention and hyperlipidemia, if present:   Statins: None: Reason: ICH    Aggressive risk factor modification: HTN, Smoking     Rehab efforts: Occupational Therapy, PT/OT/SLP to evaluate and treat, PM&R consult  recommending inpatient rehab    Diagnostics ordered/pending: None     VTE prophylaxis: Heparin 5000 units SQ every 8 hours    BP parameters: ICH: SBP <160         Current smoker    Stroke risk factor  Encourage smoking cessation     Vasogenic cerebral edema    Area of vasogenic cerebral edema identified when reviewing brain imaging in the L [place]. There is not mass effect associated with it. We will continue to monitor the patients clinical exam for any worsening of symptoms which may indicate expansion of the hemorrhage or the area of the edema resulting in the clinical change. The ICH is likely 2/2 uncontrolled hypertension.       Right sided weakness    PT/OT and SLP to evaluate and treat  Plans for inpatient rehab     Dysarthria    2/2 ICH  SLP to evaluate and treat     Hypertension    Stroke risk factor   SBP 220s on arrival  Required cardene  SBP <160 and at goal  Continue amlodipin 10, carvedilol 25, hydralazine 75         STROKE DOCUMENTATION     Acute Stroke Times   Last Known Normal  Date: 11/14/18  Last Known Normal Time: 1030  Symptom Onset Date: 11/14/18  Symptom Onset Time: 1030  Stroke Team Called Date: 11/14/18  Stroke Team Called Time: 1055  Stroke Team Arrival Date: 11/14/18  Stroke Team Arrival Time: 1058  CT Interpretation Time: 1058  Decision to Treat Time for Alteplase: (No iv alteplase candidate)  Decision to Treat Time for IR: (No IR candidate)    NIH Scale:  1a. Level Of Consciousness: 0-->Alert: keenly responsive  1b. LOC Questions: 0-->Answers both questions correctly  1c. LOC Commands: 0-->Performs both tasks correctly  2. Best Gaze: 0-->Normal  3. Visual: 0-->No visual loss  4. Facial Palsy: 1-->Minor paralysis (flattened nasolabial fold, asymmetry on smiling)  5a. Motor Arm, Left: 0-->No drift: limb holds 90 (or 45) degrees for full 10 secs  5b. Motor Arm, Right: 2-->Some effort against gravity: limb cannot get to or maintain (if cued) 90 (or 45) degrees, drifts down to bed, but has some effort against gravity  6a. Motor Leg, Left: 0-->No drift: leg holds 30 degree position for full 5 secs  6b. Motor Leg, Right: 1-->Drift: leg falls by the end of the 5-sec period but does not hit bed  7. Limb Ataxia: 0-->Absent  8. Sensory: 0-->Normal: no sensory loss  9. Best Language: 0-->No aphasia: normal  10. Dysarthria: 1-->Mild-to-moderate dysarthria: patient slurs at least some words and, at worst, can be understood with some difficulty  11. Extinction and Inattention (formerly Neglect): 0-->No abnormality  Total (NIH Stroke Scale): 5    Modified Stanley Score: 0  Richard Coma Scale:    ABCD2 Score:    [id number] Score:   HAS -BLED Score:   ICH Score:0  Hunt & Parnell Classification:       Thrombolysis Candidate? No, CT findings (ICH, SAH, etc)       Interventional Revascularization Candidate?   Is the patient eligible for mechanical endovascular reperfusion (RHONDA)?  No; ICH/ SAH       Hemorrhagic change of an Ischemic Stroke: Does this patient have an ischemic stroke with hemorrhagic  "changes? No     Subjective:     History of Present Illness:  Nikolas Callaway is a 73 y.o. male with PMHx of HTN and tobaccos abuse who was seen via telestroke on 11/14/19 at OSH due to RSW and dysarthria. Patient with little known medical history, has not been to a doctor in years. BP on arrival to [facility] was 221/2012. CT revealed L thalamic ICH. He was transferred to [facility] for further care and admitted to neuro ICU. He remained in ICU for cardene gtt and blood pressure control. Stroke team consulted on 11/18/18, patient off cardene and ready for step down.                  Past Medical History:   Diagnosis Date    Hypertension     Nontraumatic subcortical hemorrhage of left cerebral hemisphere 11/14/2018     Past Surgical History:   Procedure Laterality Date    REDUCTION-CLOSED Right 4/6/2015    Performed by Jos Surgeon at [facility]     History reviewed. No pertinent family history.  Social History     Tobacco Use    Smoking status: Current Every Day Smoker     Packs/day: 1.00     Types: Cigarettes     Start date: 1998    Smokeless tobacco: Never Used   Substance Use Topics    Alcohol use: No     Frequency: Never    Drug use: Yes     Types: "Crack" cocaine     Comment: last used 11/9/18     Review of patient's allergies indicates:  No Known Allergies    Medications: I have reviewed the current medication administration record.    Medications Prior to Admission   Medication Sig Dispense Refill Last Dose    lisinopril 10 MG tablet Take 10 mg by mouth once daily.   More than a month       Review of Systems   Constitutional: Negative for chills and fever.   Eyes: Negative for visual disturbance.   Respiratory: Negative for cough.    Cardiovascular: Negative for chest pain.   Gastrointestinal: Negative for nausea and vomiting.   Genitourinary: Negative for difficulty urinating.   Skin: Negative for rash.   Neurological: Positive for facial asymmetry, speech difficulty and weakness.     Objective:     Vital " Signs (Most Recent):  Temp: 98.2 °F (36.8 °C) (11/18/18 1100)  Pulse: (!) 57 (11/18/18 1100)  Resp: 12 (11/18/18 1100)  BP: 138/73 (11/18/18 1100)  SpO2: 100 % (11/18/18 1100)    Vital Signs Range (Last 24H):  Temp:  [98.1 °F (36.7 °C)-98.8 °F (37.1 °C)]   Pulse:  [55-72]   Resp:  [9-24]   BP: (127-172)/(63-91)   SpO2:  [98 %-100 %]   Arterial Line BP: (127-166)/(52-68)     Physical Exam   Constitutional: He is oriented to person, place, and time. He appears well-developed and well-nourished.   HENT:   Head: Normocephalic and atraumatic.   Eyes: EOM are normal. Pupils are equal, round, and reactive to light.   Cardiovascular: Bradycardia present.   Pulmonary/Chest: Effort normal. No respiratory distress.   Neurological: He is alert and oriented to person, place, and time.   Skin: Skin is warm and dry.   Vitals reviewed.      Neurological Exam:   LOC: alert  Attention Span: Good   Language: No aphasia  Articulation: Dysarthria  Orientation: Person, Place, Time   Visual Fields: Full  EOM (CN III, IV, VI): Full/intact  Pupils (CN II, III): PERRL  Facial Movement (CN VII): Lower facial weakness on the Right  Motor: Arm left  Normal 5/5  Leg left  Normal 5/5  Arm right  Paresis: 3/5  Leg right Paresis: 4/5  Sensation: Intact to light touch, temperature and vibration  Tone: Normal tone throughout      Laboratory:  CMP:   Recent Labs   Lab 11/18/18  0127   CALCIUM 8.9   ALBUMIN 2.5*   PROT 6.1      K 4.4   CO2 20*      BUN 21   CREATININE 1.0   ALKPHOS 63   ALT 17   AST 28   BILITOT 0.6     CBC:   Recent Labs   Lab 11/18/18  0127   WBC 4.71   RBC 3.73*   HGB 10.2*   HCT 32.3*      MCV 87   MCH 27.3   MCHC 31.6*     Lipid Panel:   Recent Labs   Lab 11/14/18  1103   CHOL 175   LDLCALC 98.6   HDL 62   TRIG 72     Coagulation:   Recent Labs   Lab 11/15/18  0100   INR 0.9   APTT 25.0     Hgb A1C:   Recent Labs   Lab 11/15/18  0100   HGBA1C 5.2     TSH:   Recent Labs   Lab 11/14/18  1103   TSH 1.222        Diagnostic Results:      Brain imaging:  CT Head. Date: 11/14/18 1424  1. Grossly stable focus of intracranial hemorrhage centered in the region of the left basal ganglia.  There is stable to subtly increased surrounding edema.  This lies adjacent to a previous infarct.  Correlation advised, continued follow-up recommended.  2. Stable sequela of chronic microvascular ischemic change and senescent change.  3. Nonspecific punctate focus of low attenuation within the right cerebellum, age-indeterminate infarct is a consideration.      CT Head. Date: 11/14/18 1050  1. Acute left basal ganglia hemorrhage without midline shift or hydrocephalus.  Given the location of the hematoma likely etiologies is hypertensive hemorrhage.  2. Extensive periventricular white matter changes in the centrum from chronic microvascular ischemia.  This report was flagged in Epic as abnormal.      Vessel Imaging:  none    Cardiac Evaluation:   Echo. Date: 11/15/18  · Normal left ventricular systolic function. The estimated ejection fraction is 65%  · Concentric left ventricular remodeling.  · Normal LV diastolic function.  · No wall motion abnormalities.  · Normal right ventricular systolic function.  · Mild left atrial enlargement.  · Normal central venous pressure (3 mm Hg).  · No pericardial effusion.            Annemarie Cadet PA-C  Comprehensive Stroke Center  Department of Vascular Neurology   [facility]

## 2018-11-18 NOTE — ASSESSMENT & PLAN NOTE
Area of vasogenic cerebral edema identified when reviewing brain imaging in the L [place]. There is not mass effect associated with it. We will continue to monitor the patients clinical exam for any worsening of symptoms which may indicate expansion of the hemorrhage or the area of the edema resulting in the clinical change. The ICH is likely 2/2 uncontrolled hypertension.

## 2018-11-19 LAB
ALBUMIN SERPL BCP-MCNC: 2.6 G/DL
ALP SERPL-CCNC: 65 U/L
ALT SERPL W/O P-5'-P-CCNC: 26 U/L
ANION GAP SERPL CALC-SCNC: 8 MMOL/L
AST SERPL-CCNC: 39 U/L
BASOPHILS # BLD AUTO: 0.01 K/UL
BASOPHILS NFR BLD: 0.2 %
BILIRUB SERPL-MCNC: 0.5 MG/DL
BUN SERPL-MCNC: 28 MG/DL
CALCIUM SERPL-MCNC: 8.7 MG/DL
CHLORIDE SERPL-SCNC: 107 MMOL/L
CO2 SERPL-SCNC: 23 MMOL/L
CREAT SERPL-MCNC: 1.4 MG/DL
DIFFERENTIAL METHOD: ABNORMAL
EOSINOPHIL # BLD AUTO: 0 K/UL
EOSINOPHIL NFR BLD: 0.6 %
ERYTHROCYTE [DISTWIDTH] IN BLOOD BY AUTOMATED COUNT: 14.2 %
EST. GFR  (AFRICAN AMERICAN): 57.6 ML/MIN/1.73 M^2
EST. GFR  (NON AFRICAN AMERICAN): 49.8 ML/MIN/1.73 M^2
GLUCOSE SERPL-MCNC: 88 MG/DL
HCT VFR BLD AUTO: 32.5 %
HGB BLD-MCNC: 10.2 G/DL
IMM GRANULOCYTES # BLD AUTO: 0.01 K/UL
IMM GRANULOCYTES NFR BLD AUTO: 0.2 %
LYMPHOCYTES # BLD AUTO: 2.1 K/UL
LYMPHOCYTES NFR BLD: 41.2 %
MAGNESIUM SERPL-MCNC: 1.8 MG/DL
MCH RBC QN AUTO: 27 PG
MCHC RBC AUTO-ENTMCNC: 31.4 G/DL
MCV RBC AUTO: 86 FL
MONOCYTES # BLD AUTO: 0.7 K/UL
MONOCYTES NFR BLD: 13.1 %
NEUTROPHILS # BLD AUTO: 2.2 K/UL
NEUTROPHILS NFR BLD: 44.7 %
NRBC BLD-RTO: 0 /100 WBC
PHOSPHATE SERPL-MCNC: 4.5 MG/DL
PLATELET # BLD AUTO: 204 K/UL
PMV BLD AUTO: 10.4 FL
POCT GLUCOSE: 161 MG/DL (ref 70–110)
POCT GLUCOSE: 82 MG/DL (ref 70–110)
POTASSIUM SERPL-SCNC: 4.5 MMOL/L
PROT SERPL-MCNC: 6.2 G/DL
RBC # BLD AUTO: 3.78 M/UL
SODIUM SERPL-SCNC: 138 MMOL/L
WBC # BLD AUTO: 5.02 K/UL

## 2018-11-19 PROCEDURE — 20600001 HC STEP DOWN PRIVATE ROOM

## 2018-11-19 PROCEDURE — 92507 TX SP LANG VOICE COMM INDIV: CPT

## 2018-11-19 PROCEDURE — 80053 COMPREHEN METABOLIC PANEL: CPT

## 2018-11-19 PROCEDURE — 97116 GAIT TRAINING THERAPY: CPT

## 2018-11-19 PROCEDURE — 25000003 PHARM REV CODE 250: Performed by: STUDENT IN AN ORGANIZED HEALTH CARE EDUCATION/TRAINING PROGRAM

## 2018-11-19 PROCEDURE — S4991 NICOTINE PATCH NONLEGEND: HCPCS | Performed by: NURSE PRACTITIONER

## 2018-11-19 PROCEDURE — 97535 SELF CARE MNGMENT TRAINING: CPT

## 2018-11-19 PROCEDURE — 99233 SBSQ HOSP IP/OBS HIGH 50: CPT | Mod: ,,, | Performed by: PSYCHIATRY & NEUROLOGY

## 2018-11-19 PROCEDURE — 36415 COLL VENOUS BLD VENIPUNCTURE: CPT

## 2018-11-19 PROCEDURE — 83735 ASSAY OF MAGNESIUM: CPT

## 2018-11-19 PROCEDURE — 97530 THERAPEUTIC ACTIVITIES: CPT

## 2018-11-19 PROCEDURE — A4216 STERILE WATER/SALINE, 10 ML: HCPCS | Performed by: STUDENT IN AN ORGANIZED HEALTH CARE EDUCATION/TRAINING PROGRAM

## 2018-11-19 PROCEDURE — 84100 ASSAY OF PHOSPHORUS: CPT

## 2018-11-19 PROCEDURE — 85025 COMPLETE CBC W/AUTO DIFF WBC: CPT

## 2018-11-19 PROCEDURE — 25000003 PHARM REV CODE 250: Performed by: NURSE PRACTITIONER

## 2018-11-19 PROCEDURE — 63600175 PHARM REV CODE 636 W HCPCS: Performed by: NURSE PRACTITIONER

## 2018-11-19 RX ADMIN — Medication 3 ML: at 05:11

## 2018-11-19 RX ADMIN — CARVEDILOL 25 MG: 25 TABLET, FILM COATED ORAL at 09:11

## 2018-11-19 RX ADMIN — Medication 3 ML: at 09:11

## 2018-11-19 RX ADMIN — HYDRALAZINE HYDROCHLORIDE 75 MG: 50 TABLET ORAL at 09:11

## 2018-11-19 RX ADMIN — HYDRALAZINE HYDROCHLORIDE 75 MG: 50 TABLET ORAL at 02:11

## 2018-11-19 RX ADMIN — CARVEDILOL 25 MG: 25 TABLET, FILM COATED ORAL at 11:11

## 2018-11-19 RX ADMIN — NICOTINE 1 PATCH: 21 PATCH, EXTENDED RELEASE TRANSDERMAL at 11:11

## 2018-11-19 RX ADMIN — Medication 3 ML: at 02:11

## 2018-11-19 RX ADMIN — HEPARIN SODIUM 5000 UNITS: 5000 INJECTION, SOLUTION INTRAVENOUS; SUBCUTANEOUS at 02:11

## 2018-11-19 RX ADMIN — AMLODIPINE BESYLATE 10 MG: 10 TABLET ORAL at 11:11

## 2018-11-19 RX ADMIN — STANDARDIZED SENNA CONCENTRATE AND DOCUSATE SODIUM 1 TABLET: 8.6; 5 TABLET, FILM COATED ORAL at 11:11

## 2018-11-19 RX ADMIN — HEPARIN SODIUM 5000 UNITS: 5000 INJECTION, SOLUTION INTRAVENOUS; SUBCUTANEOUS at 05:11

## 2018-11-19 RX ADMIN — HYDRALAZINE HYDROCHLORIDE 75 MG: 50 TABLET ORAL at 05:11

## 2018-11-19 RX ADMIN — HEPARIN SODIUM 5000 UNITS: 5000 INJECTION, SOLUTION INTRAVENOUS; SUBCUTANEOUS at 09:11

## 2018-11-19 NOTE — PLAN OF CARE
Problem: Occupational Therapy Goal  Goal: Occupational Therapy Goal  Goals set 11/15 to be addressed for 7 days with expiration date, 11/22:  Patient will increase functional independence with ADLs by performing:    Patient will demonstrate rolling to the right with modified independence.  Not met   Patient will demonstrate rolling to the left with CGA.   Not met  Patient will demonstrate supine -sit with min assist.   Not met  Patient will demonstrate stand pivot transfers with min assist.   Not met  Patient will demonstrate grooming while standing with mod assist.   Not met  Patient will demonstrate upper body dressing with mod assist while seated EOB.   Not met  Patient will demonstrate lower body dressing with mod assist while seated EOB.   Not met  Patient will demonstrate toileting with mod assist.   Not met  Patient will demonstrate bathing while seated EOB with mod assist.   Not met  Patient's family / caregiver will demonstrate independence and safety with assisting patient with self-care skills and functional mobility.     Not met  Patient's family / caregiver will demonstrate independence with providing ROM and changes in bed positioning.   Not met  Patient and/or patient's family will verbalize understanding of stroke prevention guidelines, personal risk factors and stroke warning signs via teachback method.  Not met          Goals remain appropriate.  JAIRO Tellez  11/19/2018

## 2018-11-19 NOTE — PLAN OF CARE
Problem: Physical Therapy Goal  Goal: Physical Therapy Goal    Goals to be met by 11/25    1. Pt will perform rolling to the R and L with SBA.   2. Pt will perform supine to sit from both sides of the bed with min assistance.  3. Pt will perform sit to supine with min assistance.  4. Pt will perform sit to stand transfers with moderate assistance.    5. Pt will perform bed <> chair transfers with moderate assistance.  6. Pt will perform gait x 10 feet with max assistance.  7. Pt will sit EOB x 5 minutes with SBA and no LOB while performing dynamic UE tasks to prepare for functional tasks in sitting.      Outcome: Ongoing (interventions implemented as appropriate)  Pt's goals remain appropriate and pt will continue to benefit from skilled PT services to work towards improved functional mobility including: bed mobility, transfers, and gait.   Ana Rosa Castillo, PT  11/19/2018

## 2018-11-19 NOTE — SUBJECTIVE & OBJECTIVE
Neurologic Chief Complaint: L thalamic ICH    Subjective:     Interval History: Patient is seen for follow-up neurological assessment and treatment recommendations: NAEON no new complaints    HPI, Past Medical, Family, and Social History remains the same as documented in the initial encounter.     Review of Systems   Constitutional: Negative for chills and fever.   Eyes: Negative for visual disturbance.   Respiratory: Negative for cough.    Cardiovascular: Negative for chest pain.   Gastrointestinal: Negative for nausea and vomiting.   Genitourinary: Negative for difficulty urinating.   Skin: Negative for rash.   Neurological: Positive for facial asymmetry, speech difficulty and weakness.     Scheduled Meds:   amLODIPine  10 mg Oral Daily    carvedilol  25 mg Oral BID    heparin (porcine)  5,000 Units Subcutaneous Q8H    hydrALAZINE  75 mg Oral Q8H    nicotine  1 patch Transdermal Daily    senna-docusate 8.6-50 mg  1 tablet Oral Daily    sodium chloride 0.9%  3 mL Intravenous Q8H     Continuous Infusions:  PRN Meds:dextrose 50%, glucagon (human recombinant), hydrALAZINE, insulin aspart U-100    Objective:     Vital Signs (Most Recent):  Temp: 97.8 °F (36.6 °C) (11/19/18 0852)  Pulse: (!) 59 (11/19/18 0852)  Resp: 15 (11/19/18 0852)  BP: 137/63 (11/19/18 0852)  SpO2: 97 % (11/19/18 0852)  BP Location: Right arm    Vital Signs Range (Last 24H):  Temp:  [97.8 °F (36.6 °C)-99.7 °F (37.6 °C)]   Pulse:  [56-68]   Resp:  [12-18]   BP: (118-151)/(63-91)   SpO2:  [93 %-100 %]   Arterial Line BP: (127-139)/(52-59)   BP Location: Right arm    Physical Exam   Constitutional: He is oriented to person, place, and time. He appears well-developed and well-nourished.   HENT:   Head: Normocephalic and atraumatic.   Eyes: EOM are normal. Pupils are equal, round, and reactive to light.   Cardiovascular: Bradycardia present.   Pulmonary/Chest: Effort normal. No respiratory distress.   Neurological: He is alert and oriented to  person, place, and time.   Skin: Skin is warm and dry.   Vitals reviewed.      Neurological Exam:   LOC: alert  Attention Span: Good   Language: No aphasia  Articulation: Dysarthria  Orientation: Person, Place, Time   Visual Fields: Full  EOM (CN III, IV, VI): Full/intact  Pupils (CN II, III): PERRL  Facial Movement (CN VII): Lower facial weakness on the Right  Motor: Arm left  Normal 5/5  Leg left  Normal 5/5  Arm right  Paresis: 3/5  Leg right Paresis: 4/5  Sensation: Intact to light touch, temperature and vibration  Tone: Normal tone throughout        Laboratory:  CMP:   Recent Labs   Lab 11/19/18  0457   CALCIUM 8.7   ALBUMIN 2.6*   PROT 6.2      K 4.5   CO2 23      BUN 28*   CREATININE 1.4   ALKPHOS 65   ALT 26   AST 39   BILITOT 0.5     CBC:   Recent Labs   Lab 11/19/18  0457   WBC 5.02   RBC 3.78*   HGB 10.2*   HCT 32.5*      MCV 86   MCH 27.0   MCHC 31.4*     Lipid Panel:   Recent Labs   Lab 11/14/18  1103   CHOL 175   LDLCALC 98.6   HDL 62   TRIG 72     Coagulation:   Recent Labs   Lab 11/15/18  0100   INR 0.9   APTT 25.0     Platelet Aggregation Study: No results for input(s): PLTAGG, PLTAGINTERP, PLTAGREGLACO, ADPPLTAGGREG in the last 168 hours.  Hgb A1C:   Recent Labs   Lab 11/15/18  0100   HGBA1C 5.2     TSH:   Recent Labs   Lab 11/14/18  1103   TSH 1.222       Diagnostic Results     Brain imaging:  CT Head. Date: 11/14/18 1424  1. Grossly stable focus of intracranial hemorrhage centered in the region of the left basal ganglia.  There is stable to subtly increased surrounding edema.  This lies adjacent to a previous infarct.  Correlation advised, continued follow-up recommended.  2. Stable sequela of chronic microvascular ischemic change and senescent change.  3. Nonspecific punctate focus of low attenuation within the right cerebellum, age-indeterminate infarct is a consideration.        CT Head. Date: 11/14/18 1050  1. Acute left basal ganglia hemorrhage without midline shift or  hydrocephalus.  Given the location of the hematoma likely etiologies is hypertensive hemorrhage.  2. Extensive periventricular white matter changes in the centrum from chronic microvascular ischemia.  This report was flagged in Epic as abnormal.        Vessel Imaging:  none     Cardiac Evaluation:   Echo. Date: 11/15/18  · Normal left ventricular systolic function. The estimated ejection fraction is 65%  · Concentric left ventricular remodeling.  · Normal LV diastolic function.  · No wall motion abnormalities.  · Normal right ventricular systolic function.  · Mild left atrial enlargement.  · Normal central venous pressure (3 mm Hg).  · No pericardial effusion.

## 2018-11-19 NOTE — PT/OT/SLP PROGRESS
"Physical Therapy Treatment    Patient Name:  Nikolas Callaway   MRN:  [id number]    Recommendations:     Discharge Recommendations:  rehabilitation facility   Discharge Equipment Recommendations: (TBD as pt progresses)   Barriers to discharge: Decreased caregiver support  Pt requires assist for mobility    Assessment:     Nikolas Callaway is a 73 y.o. male admitted with a medical diagnosis of Nontraumatic subcortical hemorrhage of left cerebral hemisphere.  He presents with the following impairments/functional limitations:  weakness, gait instability, decreased upper extremity function, decreased lower extremity function, impaired balance, impaired functional mobilty, impaired sensation, decreased safety awareness, decreased coordination . Pt is motivated to progress with mobility.    Rehab Prognosis:  good; patient would benefit from acute skilled PT services to address these deficits and reach maximum level of function.      Recent Surgery: * No surgery found *      Plan:     During this hospitalization, patient to be seen 5 x/week to address the above listed problems via gait training, therapeutic activities, therapeutic exercises, neuromuscular re-education  · Plan of Care Expires:  12/15/18   Plan of Care Reviewed with: patient    Subjective     Communicated with nurse prior to session.  Patient found supine upon PT entry to room, agreeable to treatment.    "Can you make a call for me?"  Pain/Comfort:  · Pain Rating 1: 0/10  · Pain Rating Post-Intervention 1: 0/10    Patients cultural, spiritual, Ep[employer] conflicts given the current situation: Mormon    Objective:     Patient found with: telemetry, SCD     General Precautions: Standard, aspiration, fall, aphasia   Orthopedic Precautions:N/A   Braces: N/A     Functional Mobility:  · Bed Mobility:     · Rolling Left:  maximal assistance; pt educated to reach with L UE for R UE to assist with initiating roll  · Supine to Sit: moderate assistance through L " sidelying  · Sit to Supine: maximal assistance  · Transfers:     · Sit to Stand:  maximal assistance with hemiwalker x 4 trials with pt leaning posterior when coming to stand and manual cues at R hip/knee to facilitate ext.  · Bed to/from Chair: maximal assistance with  hand-held assist  using  Stand Pivot with manual cues at R hip/knee to facilitate ext  · Gait: 5ft then 4ft then 8ft with HW in L UE with max assist +1 to follow with bedside chair. Pt rested in sitting between gait trials. Pt performed gait with flexed trunk (verbal and manual cues for upright posture), decreased clearance of R foot during swing phase, decreased step length on R>L, and at slow pace. Pt required verbal cues for sequencing and manual cues at R hip/knee for facilitating ext during stance phase.    AM-PAC 6 CLICK MOBILITY  Turning over in bed (including adjusting bedclothes, sheets and blankets)?: 3  Sitting down on and standing up from a chair with arms (e.g., wheelchair, bedside commode, etc.): 2  Moving from lying on back to sitting on the side of the bed?: 2  Moving to and from a bed to a chair (including a wheelchair)?: 2  Need to walk in hospital room?: 2  Climbing 3-5 steps with a railing?: 1  Basic Mobility Total Score: 12     Patient left supine (after sitting up in bedside chair ~ 2 hours, transferred back to bed as above)with all lines intact, call button in reach, bed alarm on and nurse notified..    GOALS:   Multidisciplinary Problems     Physical Therapy Goals        Problem: Physical Therapy Goal    Goal Priority Disciplines Outcome Goal Variances Interventions   Physical Therapy Goal     PT, PT/OT Ongoing (interventions implemented as appropriate)     Description:    Goals to be met by 11/25    1. Pt will perform rolling to the R and L with SBA.   2. Pt will perform supine to sit from both sides of the bed with min assistance.  3. Pt will perform sit to supine with min assistance.  4. Pt will perform sit to stand  transfers with moderate assistance.    5. Pt will perform bed <> chair transfers with moderate assistance.  6. Pt will perform gait x 10 feet with max assistance.  7. Pt will sit EOB x 5 minutes with SBA and no LOB while performing dynamic UE tasks to prepare for functional tasks in sitting.                       Time Tracking:     PT Received On: 11/19/18  PT Start Time: 0755     PT Stop Time: 0828  PT Total Time (min): 33 min     Billable Minutes: Gait Training 20 and Therapeutic Activity 13    Treatment Type: Treatment  PT/PTA: PT           Ana Rosa Castillo, PT  11/19/2018

## 2018-11-19 NOTE — PT/OT/SLP PROGRESS
"Occupational Therapy   Treatment    Name: Nikolas Callaway  MRN: [id number]  Admitting Diagnosis:  Nontraumatic subcortical hemorrhage of left cerebral hemisphere       Recommendations:     Discharge Recommendations: rehabilitation facility  Discharge Equipment Recommendations:  3-in-1 commode, bath bench  Barriers to discharge:  Inaccessible home environment, Decreased caregiver support    Subjective   Patient:  "I got it."  Pain/Comfort:  · Pain Rating 1: 0/10  · Pain Rating Post-Intervention 1: 0/10    Objective:     Communicated with: Nurse prior to session.  Patient found with all lines intact, call button in reach and bed alarm on and telemetry, SCD, bed alarm upon OT entry to room.    General Precautions: Standard, aspiration, fall   Orthopedic Precautions:N/A   Braces: N/A     Occupational Performance:    Activities of Daily Living:  Feeding:  Minimum assistance while seated upright in bed with right UE in weight bearing.  Assistance required for cutting food, positioning, opening containers and checking for pocketing on the right.    [facility] 6 Click ADL: 10    Treatment & Education:  Daily orientation provided.  P/AAROM performed right UE one set x 10 rep in all planes of motion with stretches provided at end range; assistance and facilitation provided for upward rotation of the scapula during shoulder flexion and abduction.  Positioning provided for midline orientation with bilateral UEs elevated and heels lifted off mattress.   Family not present during the session.    Patient left supine with all lines intact, call button in reach and bed alarm on  Education:    Assessment:     Nikolas Callaway is a 73 y.o. male with a medical diagnosis of Nontraumatic subcortical hemorrhage of left cerebral hemisphere.  He presents with the following performance deficits affecting function are weakness, impaired endurance, impaired sensation, impaired self care skills, impaired functional mobilty, gait instability, impaired balance, " impaired cognition, decreased coordination, decreased upper extremity function, decreased lower extremity function, decreased safety awareness, impaired coordination, decreased ROM, abnormal tone, impaired fine motor.     Rehab Prognosis:  Good; patient would benefit from acute skilled OT services to address these deficits and reach maximum level of function.       Plan:     Patient to be seen 4 x/week to address the above listed problems via self-care/home management, therapeutic exercises, therapeutic groups, neuromuscular re-education, cognitive retraining, sensory integration, therapeutic activities  · Plan of Care Expires: 12/13/18  · Plan of Care Reviewed with: patient    This Plan of care has been discussed with the patient who was involved in its development and understands and is in agreement with the identified goals and treatment plan    GOALS:   Multidisciplinary Problems     Occupational Therapy Goals        Problem: Occupational Therapy Goal    Goal Priority Disciplines Outcome Interventions   Occupational Therapy Goal     OT, PT/OT     Description:  Goals set 11/19 to be addressed for 7 days with expiration date, 11/26:  Patient will increase functional independence with ADLs by performing:    Patient will demonstrate rolling to the right with modified independence.  Not met   Patient will demonstrate rolling to the left with CGA.   Not met  Patient will demonstrate supine -sit with min assist.   Not met  Patient will demonstrate stand pivot transfers with min assist.   Not met  Patient will demonstrate grooming while standing with mod assist.   Not met  Patient will demonstrate upper body dressing with mod assist while seated EOB.   Not met  Patient will demonstrate lower body dressing with mod assist while seated EOB.   Not met  Patient will demonstrate toileting with mod assist.   Not met  Patient will demonstrate bathing while seated EOB with mod assist.   Not met  Patient's family / caregiver  will demonstrate independence and safety with assisting patient with self-care skills and functional mobility.     Not met  Patient's family / caregiver will demonstrate independence with providing ROM and changes in bed positioning.   Not met  Patient and/or patient's family will verbalize understanding of stroke prevention guidelines, personal risk factors and stroke warning signs via teachback method.  Not met                            Time Tracking:     OT Date of Treatment: 11/19/18  OT Start Time: 1246  OT Stop Time: 1303  OT Total Time (min): 17 min    Billable Minutes:Self Care/Home Management 17    JAIRO Tellez  11/19/2018

## 2018-11-19 NOTE — PROGRESS NOTES
[facility]  Vascular Neurology  Comprehensive Stroke Center  Progress Note    Assessment/Plan:     * Nontraumatic subcortical hemorrhage of left cerebral hemisphere    Nikolas Callaway is a 73 y.o. 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 [place] [facility] [place] to [place] ICU. Etiology likely hypertensive ICH.       Antithrombotics for secondary stroke prevention: Antiplatelets: None: Intracerebral Hemorrhage    Statins for secondary stroke prevention and hyperlipidemia, if present:   Statins: None: Reason: ICH    Aggressive risk factor modification: HTN, Smoking     Rehab efforts: Occupational Therapy, PT/OT/SLP to evaluate and treat, PM&R consult  recommending inpatient rehab    Diagnostics ordered/pending: None     VTE prophylaxis: Heparin 5000 units SQ every 8 hours    BP parameters: ICH: SBP <160         Current smoker    Stroke risk factor  Encourage smoking cessation     Vasogenic cerebral edema    Area of vasogenic cerebral edema identified when reviewing brain imaging in the L [place]. There is not mass effect associated with it. We will continue to monitor the patients clinical exam for any worsening of symptoms which may indicate expansion of the hemorrhage or the area of the edema resulting in the clinical change. The ICH is likely 2/2 uncontrolled hypertension.       Right sided weakness    PT/OT and SLP to evaluate and treat  Plans for inpatient rehab     Dysarthria    2/2 ICH  SLP to evaluate and treat     Hypertension    Stroke risk factor   SBP 220s on arrival  Required cardene  SBP <160 and at goal  Continue amlodipin 10, carvedilol 25, hydralazine 75  Not currently requiring PRN BP medications          11/19 encourage fluid intake due to Cr, waiting rehab placement    STROKE DOCUMENTATION   Acute Stroke Times   Last Known Normal Date: 11/14/18  Last Known Normal Time: 1030  Symptom Onset Date:  11/14/18  Symptom Onset Time: 1030  Stroke Team Called Date: 11/14/18  Stroke Team Called Time: 1055  Stroke Team Arrival Date: 11/14/18  Stroke Team Arrival Time: 1058  CT Interpretation Time: 1058  Decision to Treat Time for Alteplase: (No iv alteplase candidate)  Decision to Treat Time for IR: (No IR candidate)    NIH Scale:  1a. Level Of Consciousness: 0-->Alert: keenly responsive  1b. LOC Questions: 0-->Answers both questions correctly  1c. LOC Commands: 0-->Performs both tasks correctly  2. Best Gaze: 0-->Normal  3. Visual: 0-->No visual loss  4. Facial Palsy: 1-->Minor paralysis (flattened nasolabial fold, asymmetry on smiling)  5a. Motor Arm, Left: 0-->No drift: limb holds 90 (or 45) degrees for full 10 secs  5b. Motor Arm, Right: 2-->Some effort against gravity: limb cannot get to or maintain (if cued) 90 (or 45) degrees, drifts down to bed, but has some effort against gravity  6a. Motor Leg, Left: 0-->No drift: leg holds 30 degree position for full 5 secs  6b. Motor Leg, Right: 0-->No drift: leg holds 30 degree position for full 5 secs  7. Limb Ataxia: 0-->Absent  8. Sensory: 0-->Normal: no sensory loss  9. Best Language: 0-->No aphasia: normal  10. Dysarthria: 1-->Mild-to-moderate dysarthria: patient slurs at least some words and, at worst, can be understood with some difficulty  11. Extinction and Inattention (formerly Neglect): 0-->No abnormality  Total (NIH Stroke Scale): 4       Modified Starr Score: 0  Richard Coma Scale:    ABCD2 Score:    [id number] Score:   HAS -BLED Score:   ICH Score:0  Hunt & Parnell Classification:      Hemorrhagic change of an Ischemic Stroke: Does this patient have an ischemic stroke with hemorrhagic changes? No     Neurologic Chief Complaint: L thalamic ICH    Subjective:     Interval History: Patient is seen for follow-up neurological assessment and treatment recommendations: NAEON no new complaints    HPI, Past Medical, Family, and Social History remains the same as  documented in the initial encounter.     Review of Systems   Constitutional: Negative for chills and fever.   Eyes: Negative for visual disturbance.   Respiratory: Negative for cough.    Cardiovascular: Negative for chest pain.   Gastrointestinal: Negative for nausea and vomiting.   Genitourinary: Negative for difficulty urinating.   Skin: Negative for rash.   Neurological: Positive for facial asymmetry, speech difficulty and weakness.     Scheduled Meds:   amLODIPine  10 mg Oral Daily    carvedilol  25 mg Oral BID    heparin (porcine)  5,000 Units Subcutaneous Q8H    hydrALAZINE  75 mg Oral Q8H    nicotine  1 patch Transdermal Daily    senna-docusate 8.6-50 mg  1 tablet Oral Daily    sodium chloride 0.9%  3 mL Intravenous Q8H     Continuous Infusions:  PRN Meds:dextrose 50%, glucagon (human recombinant), hydrALAZINE, insulin aspart U-100    Objective:     Vital Signs (Most Recent):  Temp: 97.8 °F (36.6 °C) (11/19/18 0852)  Pulse: (!) 59 (11/19/18 0852)  Resp: 15 (11/19/18 0852)  BP: 137/63 (11/19/18 0852)  SpO2: 97 % (11/19/18 0852)  BP Location: Right arm    Vital Signs Range (Last 24H):  Temp:  [97.8 °F (36.6 °C)-99.7 °F (37.6 °C)]   Pulse:  [56-68]   Resp:  [12-18]   BP: (118-151)/(63-91)   SpO2:  [93 %-100 %]   Arterial Line BP: (127-139)/(52-59)   BP Location: Right arm    Physical Exam   Constitutional: He is oriented to person, place, and time. He appears well-developed and well-nourished.   HENT:   Head: Normocephalic and atraumatic.   Eyes: EOM are normal. Pupils are equal, round, and reactive to light.   Cardiovascular: Bradycardia present.   Pulmonary/Chest: Effort normal. No respiratory distress.   Neurological: He is alert and oriented to person, place, and time.   Skin: Skin is warm and dry.   Vitals reviewed.      Neurological Exam:   LOC: alert  Attention Span: Good   Language: No aphasia  Articulation: Dysarthria  Orientation: Person, Place, Time   Visual Fields: Full  EOM (CN III, IV, VI):  Full/intact  Pupils (CN II, III): PERRL  Facial Movement (CN VII): Lower facial weakness on the Right  Motor: Arm left  Normal 5/5  Leg left  Normal 5/5  Arm right  Paresis: 3/5  Leg right Paresis: 4/5  Sensation: Intact to light touch, temperature and vibration  Tone: Normal tone throughout        Laboratory:  CMP:   Recent Labs   Lab 11/19/18  0457   CALCIUM 8.7   ALBUMIN 2.6*   PROT 6.2      K 4.5   CO2 23      BUN 28*   CREATININE 1.4   ALKPHOS 65   ALT 26   AST 39   BILITOT 0.5     CBC:   Recent Labs   Lab 11/19/18  0457   WBC 5.02   RBC 3.78*   HGB 10.2*   HCT 32.5*      MCV 86   MCH 27.0   MCHC 31.4*     Lipid Panel:   Recent Labs   Lab 11/14/18  1103   CHOL 175   LDLCALC 98.6   HDL 62   TRIG 72     Coagulation:   Recent Labs   Lab 11/15/18  0100   INR 0.9   APTT 25.0     Platelet Aggregation Study: No results for input(s): PLTAGG, PLTAGINTERP, PLTAGREGLACO, ADPPLTAGGREG in the last 168 hours.  Hgb A1C:   Recent Labs   Lab 11/15/18  0100   HGBA1C 5.2     TSH:   Recent Labs   Lab 11/14/18  1103   TSH 1.222       Diagnostic Results     Brain imaging:  CT Head. Date: 11/14/18 1424  1. Grossly stable focus of intracranial hemorrhage centered in the region of the left basal ganglia.  There is stable to subtly increased surrounding edema.  This lies adjacent to a previous infarct.  Correlation advised, continued follow-up recommended.  2. Stable sequela of chronic microvascular ischemic change and senescent change.  3. Nonspecific punctate focus of low attenuation within the right cerebellum, age-indeterminate infarct is a consideration.        CT Head. Date: 11/14/18 1050  1. Acute left basal ganglia hemorrhage without midline shift or hydrocephalus.  Given the location of the hematoma likely etiologies is hypertensive hemorrhage.  2. Extensive periventricular white matter changes in the centrum from chronic microvascular ischemia.  This report was flagged in Epic as abnormal.        Vessel  Imaging:  none     Cardiac Evaluation:   Echo. Date: 11/15/18  · Normal left ventricular systolic function. The estimated ejection fraction is 65%  · Concentric left ventricular remodeling.  · Normal LV diastolic function.  · No wall motion abnormalities.  · Normal right ventricular systolic function.  · Mild left atrial enlargement.  · Normal central venous pressure (3 mm Hg).  · No pericardial effusion.                   Annemarie Cadet PA-C  [facility]  Department of Vascular Neurology   [facility]

## 2018-11-19 NOTE — CONSULTS
"  [facility]  Adult Nutrition  Consult Note    SUMMARY     Recommendations    Recommendation/Intervention: 1. Continue mechanical soft diet as tolerated. 2. Provide Boost Plus TID.  Goals: 1. Pt to tolerate and consume >75% meals.  Nutrition Goal Status: new  Communication of RD Recs: discussed on rounds    Reason for Assessment    Reason for Assessment: consult  Diagnosis: stroke/CVA  Relevant Medical History: HTN, smoker  Interdisciplinary Rounds: attended  General Information Comments: Pt s/p hemorrhage is on mechanical soft diet with very good intake. Pt appears thin, did not want NFPE and reports that this is his normal weight and that he has always been thin. According to his chart, pt was 165 lbs in  April 2015. Wife makes his meals; provided low salt diet education with handout. Pt verbally acknowledged.  Nutrition Discharge Planning: Pt with adequate po intake to meet nutrition needs. Provided low salt diet education.    Nutrition Risk Screen    Nutrition Risk Screen: no indicators present    Nutrition/Diet History    Patient Reported Diet/Restrictions/Preferences: general  Do you have any cultural, spiritual, Yarsanism conflicts, given your current situation?: Temple    Anthropometrics    Temp: 98.5 °F (36.9 °C)  Height Method: Stated  Height: 5' 9" (175.3 cm)  Height (inches): 69 in  Weight Method: Bed Scale  Weight: 61.6 kg (135 lb 12.9 oz)  Weight (lb): 135.8 lb  Ideal Body Weight (IBW), Male: 160 lb  % Ideal Body Weight, Male (lb): 88.05 lb  BMI (Calculated): 20.8       Lab/Procedures/Meds    Pertinent Labs Reviewed: reviewed  Pertinent Labs Comments: POCT glu 161, BUN 28, Alb 2.6, A1c 5.2%  Pertinent Medications Reviewed: reviewed  Pertinent Medications Comments: senna/colace    Physical Findings/Assessment    Overall Physical Appearance: (thin, nourished)    Estimated/Assessed Needs    Weight Used For Calorie Calculations: 61.6 kg (135 lb 12.9 oz)  Energy Calorie Requirements " (kcal): 1695  Energy Need Method: Clinton-St Jeor(PAL 1.25)  Protein Requirements: 62-74g  Weight Used For Protein Calculations: 61.6 kg (135 lb 12.9 oz)  RDA Method (mL): 1695     Nutrition Prescription Ordered    Current Diet Order: mechanical soft diet    Evaluation of Received Nutrient/Fluid Intake    % Intake of Estimated Energy Needs: 75 - 100 %  % Meal Intake: 75 - 100 %    Nutrition Risk    Level of Risk/Frequency of Follow-up: low     Assessment and Plan    Nutrition Problem  Swallowing difficulty    Related to (etiology):   stroke    Signs and Symptoms (as evidenced by):   Pt on mechanical soft diet.      Nutrition Diagnosis Status:   Continues    Monitor and Evaluation    Food and Nutrient Intake: energy intake, food and beverage intake  Food and Nutrient Adminstration: diet order  Knowledge/Beliefs/Attitudes: food and nutrition knowledge/skill  Physical Activity and Function: factors affecting access to physical activity  Anthropometric Measurements: weight, weight change, body mass index  Biochemical Data, Medical Tests and Procedures: electrolyte and renal panel, gastrointestinal profile, glucose/endocrine profile, inflammatory profile, lipid profile  Nutrition-Focused Physical Findings: overall appearance     Nutrition Follow-Up    RD Follow-up?: Yes

## 2018-11-19 NOTE — PLAN OF CARE
Problem: Patient Care Overview  Goal: Plan of Care Review  Outcome: Ongoing (interventions implemented as appropriate)  POC reviewed with patient. All questions and concerns reviewed. VSS throughout shift. Fall/safety precautions implemented and maintained. Condom catheter care provided. Blood glucose monitored. Will continue to monitor.

## 2018-11-19 NOTE — PLAN OF CARE
Problem: SLP Goal  Goal: SLP Goal  Speech Language Pathology Goals  Goals expected to be met by 11/22      Pt will tolerate diet of  thin liquids and mechanical soft solids, solids] without overt clinical signs of aspiration   Pt will participate in trials of regular solids within speech therapy sessions to help determine least restrictive diet   Pt will complete OMEs x10 w/ SLP model to enhance oral motor function   Pt will participate in ongoing assessment of speech language and cognitive linguistic skills to help rule out deficits and determine therapeutic plan of care          Pt with slow progress towards goals     Bethany Irwin MS, CCC-SLP  Speech Language Pathologist  Pager: [phone]  Date 11/19/2018

## 2018-11-19 NOTE — ASSESSMENT & PLAN NOTE
Stroke risk factor   SBP 220s on arrival  Required cardene  SBP <160 and at goal  Continue amlodipin 10, carvedilol 25, hydralazine 75  Not currently requiring PRN BP medications

## 2018-11-19 NOTE — HOSPITAL COURSE
Mr. Nikolas Callaway was admitted to [facility] for higher level of care. Stroke etiology remains unclear at this time; suspected to be small artery occlusion 2/2 small vessel disease with an area of hemorrhagic conversion, though hypertensive process could have also contributed to the area of hemorrhage. Ordered 30-day cardiac event monitor at discharge due to mild L atrial enlargement noted on echo.    Patient discharged with recommendations for admission to [facility] inpatient rehab. Family by phone amenable to plan.     Patient with improvement in stroke symptoms since admission. Inpatient acute stroke work up completed and patient stable for discharge. Please see all appropriate medication changes, imaging results, and necessary follow-up below.

## 2018-11-19 NOTE — SUBJECTIVE & OBJECTIVE
"    Past Medical History:   Diagnosis Date    Hypertension     Nontraumatic subcortical hemorrhage of left cerebral hemisphere 11/14/2018    Stage 3 chronic kidney disease 11/14/2018    GFR 58 on admission 11/14/18     Past Surgical History:   Procedure Laterality Date    REDUCTION-CLOSED Right 4/6/2015    Performed by Carmen Surgeon at [facility]     History reviewed. No pertinent family history.  Social History     Tobacco Use    Smoking status: Current Every Day Smoker     Packs/day: 1.00     Types: Cigarettes     Start date: 1998    Smokeless tobacco: Never Used   Substance Use Topics    Alcohol use: No     Frequency: Never    Drug use: Yes     Types: "Crack" cocaine     Comment: last used 11/9/18     Review of patient's allergies indicates:  No Known Allergies    Medications: I have reviewed the current medication administration record.    Medications Prior to Admission   Medication Sig Dispense Refill Last Dose    lisinopril 10 MG tablet Take 10 mg by mouth once daily.   More than a month       Review of Systems   Constitutional: Negative for chills and fever.   Eyes: Negative for visual disturbance.   Respiratory: Negative for cough.    Cardiovascular: Negative for chest pain.   Gastrointestinal: Negative for nausea and vomiting.   Genitourinary: Negative for difficulty urinating.   Skin: Negative for rash.   Neurological: Positive for facial asymmetry, speech difficulty and weakness.     Objective:     Vital Signs (Most Recent):  Temp: 97.8 °F (36.6 °C) (11/19/18 0852)  Pulse: (!) 59 (11/19/18 0852)  Resp: 15 (11/19/18 0852)  BP: 137/63 (11/19/18 0852)  SpO2: 97 % (11/19/18 0852)    Vital Signs Range (Last 24H):  Temp:  [97.8 °F (36.6 °C)-99.7 °F (37.6 °C)]   Pulse:  [56-68]   Resp:  [12-18]   BP: (118-151)/(63-91)   SpO2:  [93 %-100 %]   Arterial Line BP: (127-139)/(52-59)     Physical Exam   Constitutional: He is oriented to person, place, and time. He appears well-developed and well-nourished. "   HENT:   Head: Normocephalic and atraumatic.   Eyes: EOM are normal. Pupils are equal, round, and reactive to light.   Cardiovascular: Bradycardia present.   Pulmonary/Chest: Effort normal. No respiratory distress.   Neurological: He is alert and oriented to person, place, and time.   Skin: Skin is warm and dry.   Vitals reviewed.      Neurological Exam:   LOC: alert  Attention Span: Good   Language: No aphasia  Articulation: Dysarthria  Orientation: Person, Place, Time   Visual Fields: Full  EOM (CN III, IV, VI): Full/intact  Pupils (CN II, III): PERRL  Facial Movement (CN VII): Lower facial weakness on the Right  Motor: Arm left  Normal 5/5  Leg left  Normal 5/5  Arm right  Paresis: 3/5  Leg right Paresis: 4/5  Sensation: Intact to light touch, temperature and vibration  Tone: Normal tone throughout      Laboratory:  CMP:   Recent Labs   Lab 11/19/18  0457   CALCIUM 8.7   ALBUMIN 2.6*   PROT 6.2      K 4.5   CO2 23      BUN 28*   CREATININE 1.4   ALKPHOS 65   ALT 26   AST 39   BILITOT 0.5     CBC:   Recent Labs   Lab 11/19/18  0457   WBC 5.02   RBC 3.78*   HGB 10.2*   HCT 32.5*      MCV 86   MCH 27.0   MCHC 31.4*     Lipid Panel:   Recent Labs   Lab 11/14/18  1103   CHOL 175   LDLCALC 98.6   HDL 62   TRIG 72     Coagulation:   Recent Labs   Lab 11/15/18  0100   INR 0.9   APTT 25.0     Hgb A1C:   Recent Labs   Lab 11/15/18  0100   HGBA1C 5.2     TSH:   Recent Labs   Lab 11/14/18  1103   TSH 1.222       Diagnostic Results:      Brain imaging:  CT Head. Date: 11/14/18 1424  1. Grossly stable focus of intracranial hemorrhage centered in the region of the left basal ganglia.  There is stable to subtly increased surrounding edema.  This lies adjacent to a previous infarct.  Correlation advised, continued follow-up recommended.  2. Stable sequela of chronic microvascular ischemic change and senescent change.  3. Nonspecific punctate focus of low attenuation within the right cerebellum,  age-indeterminate infarct is a consideration.      CT Head. Date: 11/14/18 1050  1. Acute left basal ganglia hemorrhage without midline shift or hydrocephalus.  Given the location of the hematoma likely etiologies is hypertensive hemorrhage.  2. Extensive periventricular white matter changes in the centrum from chronic microvascular ischemia.  This report was flagged in Epic as abnormal.      Vessel Imaging:  none    Cardiac Evaluation:   Echo. Date: 11/15/18  · Normal left ventricular systolic function. The estimated ejection fraction is 65%  · Concentric left ventricular remodeling.  · Normal LV diastolic function.  · No wall motion abnormalities.  · Normal right ventricular systolic function.  · Mild left atrial enlargement.  · Normal central venous pressure (3 mm Hg).  · No pericardial effusion.

## 2018-11-19 NOTE — PLAN OF CARE
CM spoke to Korina at [facility] and they are reviewing referral.      11/19/18 1200   Post-Acute Status   Post-Acute Authorization Placement   Post-Acute Placement Status Pending Clinical Review

## 2018-11-19 NOTE — PLAN OF CARE
Problem: Patient Care Overview  Goal: Plan of Care Review  Outcome: Ongoing (interventions implemented as appropriate)  No acute issues, pt resting comfortably. VSS.  Pt bike is at the ER.

## 2018-11-19 NOTE — PT/OT/SLP PROGRESS
"Speech Language Pathology Treatment    Patient Name:  Nikolas Callaway   MRN:  [id number]  Admitting Diagnosis: Nontraumatic subcortical hemorrhage of left cerebral hemisphere    Recommendations:                 General Recommendations:  Dysphagia therapy and Speech/language therapy  Diet recommendations:  Mechanical soft, Liquid Diet Level: Thin   Aspiration Precautions: 1 bite/sip at a time, Check for pocketing/oral residue, Feed only when awake/alert, HOB to 90 degrees and Meds crushed in puree   General Precautions: Standard, fall  Communication strategies:  none    Subjective   "I need to call my wife help me with the phone here" Pt attempted in to make phone call upon arrival     Pain/Comfort:  Pain Rating 1: 0/10  Pain Rating Post-Intervention 1: 0/10    Objective:     Has the patient been evaluated by SLP for swallowing?   Yes  Keep patient NPO? No   Current Respiratory Status:    Room air    Pt without home exercise program for OMEs at the bedside. Pt reports he has not seen written instructions of completed OMEs since transfer from ICU. Pt remains with slurred speech and decreased intelligibility at the conversation level. Pt able recall 1/3 dysarthria compensatory strategies.  SLP reviewed with Pt dysarthria compensatory strategies and pt able to implement at the phrase level however warrants SLP mod verbal cueing to use in conversational exchanges . SLP reviewed OMEs previously established and Pt completed x3 independently with good ability and improved right sided facial mobility appreciated. SLP provided with a replacement written home exercise program for oral motor exercises and discussed with pt importance to continue to perform postures 2-3 daily outside of schedule speech therapy sessions. Pt demonstrated understanding and agreement with plan. Speech to continue to follow.     Assessment:     Nikolas Callaway is a 73 y.o. male with an SLP diagnosis of Dysarthria.     Goals:   Multidisciplinary Problems     " SLP Goals        Problem: SLP Goal    Goal Priority Disciplines Outcome   SLP Goal     SLP    Description:  Speech Language Pathology Goals  Goals expected to be met by 11/22      Pt will tolerate diet of  thin liquids and mechanical soft solids, solids] without overt clinical signs of aspiration   Pt will participate in trials of regular solids within speech therapy sessions to help determine least restrictive diet   Pt will complete OMEs x10 w/ SLP model to enhance oral motor function   Pt will participate in ongoing assessment of speech language and cognitive linguistic skills to help rule out deficits and determine therapeutic plan of care                         Plan:     · Patient to be seen:  3 x/week   · Plan of Care expires:  12/14/18  · Plan of Care reviewed with:  patient   · SLP Follow-Up:  Yes       Discharge recommendations:  rehabilitation facility   Barriers to Discharge:  Level of Skilled Assistance Needed      Time Tracking:     SLP Treatment Date:   11/19/18  Speech Start Time:  1126  Speech Stop Time:  1137     Speech Total Time (min):  11 min    Billable Minutes: Speech Therapy Individual 11    Bethany Irwin CCC-SLP  11/19/2018

## 2018-11-20 LAB
ALBUMIN SERPL BCP-MCNC: 2.6 G/DL
ALP SERPL-CCNC: 65 U/L
ALT SERPL W/O P-5'-P-CCNC: 22 U/L
ANION GAP SERPL CALC-SCNC: 8 MMOL/L
AST SERPL-CCNC: 29 U/L
BASOPHILS # BLD AUTO: 0.02 K/UL
BASOPHILS NFR BLD: 0.4 %
BILIRUB SERPL-MCNC: 0.3 MG/DL
BUN SERPL-MCNC: 33 MG/DL
CALCIUM SERPL-MCNC: 8.6 MG/DL
CHLORIDE SERPL-SCNC: 108 MMOL/L
CO2 SERPL-SCNC: 23 MMOL/L
CREAT SERPL-MCNC: 1.3 MG/DL
DIFFERENTIAL METHOD: ABNORMAL
EOSINOPHIL # BLD AUTO: 0.1 K/UL
EOSINOPHIL NFR BLD: 1 %
ERYTHROCYTE [DISTWIDTH] IN BLOOD BY AUTOMATED COUNT: 14.3 %
EST. GFR  (AFRICAN AMERICAN): >60 ML/MIN/1.73 M^2
EST. GFR  (NON AFRICAN AMERICAN): 54.5 ML/MIN/1.73 M^2
GLUCOSE SERPL-MCNC: 89 MG/DL
HCT VFR BLD AUTO: 32.3 %
HGB BLD-MCNC: 10.1 G/DL
IMM GRANULOCYTES # BLD AUTO: 0.01 K/UL
IMM GRANULOCYTES NFR BLD AUTO: 0.2 %
LYMPHOCYTES # BLD AUTO: 2.1 K/UL
LYMPHOCYTES NFR BLD: 40.1 %
MAGNESIUM SERPL-MCNC: 2 MG/DL
MCH RBC QN AUTO: 27.6 PG
MCHC RBC AUTO-ENTMCNC: 31.3 G/DL
MCV RBC AUTO: 88 FL
MONOCYTES # BLD AUTO: 0.6 K/UL
MONOCYTES NFR BLD: 11.8 %
NEUTROPHILS # BLD AUTO: 2.5 K/UL
NEUTROPHILS NFR BLD: 46.5 %
NRBC BLD-RTO: 0 /100 WBC
PHOSPHATE SERPL-MCNC: 4.2 MG/DL
PLATELET # BLD AUTO: 213 K/UL
PMV BLD AUTO: 11.2 FL
POCT GLUCOSE: 155 MG/DL (ref 70–110)
POCT GLUCOSE: 91 MG/DL (ref 70–110)
POCT GLUCOSE: 98 MG/DL (ref 70–110)
POTASSIUM SERPL-SCNC: 4.6 MMOL/L
PROT SERPL-MCNC: 6.2 G/DL
RBC # BLD AUTO: 3.66 M/UL
SODIUM SERPL-SCNC: 139 MMOL/L
WBC # BLD AUTO: 5.26 K/UL

## 2018-11-20 PROCEDURE — 25000003 PHARM REV CODE 250: Performed by: STUDENT IN AN ORGANIZED HEALTH CARE EDUCATION/TRAINING PROGRAM

## 2018-11-20 PROCEDURE — 83735 ASSAY OF MAGNESIUM: CPT

## 2018-11-20 PROCEDURE — 97110 THERAPEUTIC EXERCISES: CPT

## 2018-11-20 PROCEDURE — 85025 COMPLETE CBC W/AUTO DIFF WBC: CPT

## 2018-11-20 PROCEDURE — 63600175 PHARM REV CODE 636 W HCPCS: Performed by: PSYCHIATRY & NEUROLOGY

## 2018-11-20 PROCEDURE — 63600175 PHARM REV CODE 636 W HCPCS: Performed by: NURSE PRACTITIONER

## 2018-11-20 PROCEDURE — 97112 NEUROMUSCULAR REEDUCATION: CPT

## 2018-11-20 PROCEDURE — 84100 ASSAY OF PHOSPHORUS: CPT

## 2018-11-20 PROCEDURE — 97535 SELF CARE MNGMENT TRAINING: CPT

## 2018-11-20 PROCEDURE — A4216 STERILE WATER/SALINE, 10 ML: HCPCS | Performed by: STUDENT IN AN ORGANIZED HEALTH CARE EDUCATION/TRAINING PROGRAM

## 2018-11-20 PROCEDURE — S4991 NICOTINE PATCH NONLEGEND: HCPCS | Performed by: NURSE PRACTITIONER

## 2018-11-20 PROCEDURE — 20600001 HC STEP DOWN PRIVATE ROOM

## 2018-11-20 PROCEDURE — 97530 THERAPEUTIC ACTIVITIES: CPT

## 2018-11-20 PROCEDURE — 25000003 PHARM REV CODE 250: Performed by: NURSE PRACTITIONER

## 2018-11-20 PROCEDURE — 80053 COMPREHEN METABOLIC PANEL: CPT

## 2018-11-20 PROCEDURE — 36415 COLL VENOUS BLD VENIPUNCTURE: CPT

## 2018-11-20 PROCEDURE — 99233 SBSQ HOSP IP/OBS HIGH 50: CPT | Mod: ,,, | Performed by: PSYCHIATRY & NEUROLOGY

## 2018-11-20 RX ADMIN — NICOTINE 1 PATCH: 21 PATCH, EXTENDED RELEASE TRANSDERMAL at 09:11

## 2018-11-20 RX ADMIN — AMLODIPINE BESYLATE 10 MG: 10 TABLET ORAL at 09:11

## 2018-11-20 RX ADMIN — CARVEDILOL 25 MG: 25 TABLET, FILM COATED ORAL at 09:11

## 2018-11-20 RX ADMIN — Medication 3 ML: at 05:11

## 2018-11-20 RX ADMIN — HYDRALAZINE HYDROCHLORIDE 75 MG: 50 TABLET ORAL at 05:11

## 2018-11-20 RX ADMIN — HEPARIN SODIUM 5000 UNITS: 5000 INJECTION, SOLUTION INTRAVENOUS; SUBCUTANEOUS at 01:11

## 2018-11-20 RX ADMIN — HEPARIN SODIUM 5000 UNITS: 5000 INJECTION, SOLUTION INTRAVENOUS; SUBCUTANEOUS at 08:11

## 2018-11-20 RX ADMIN — INSULIN ASPART 2 UNITS: 100 INJECTION, SOLUTION INTRAVENOUS; SUBCUTANEOUS at 05:11

## 2018-11-20 RX ADMIN — HEPARIN SODIUM 5000 UNITS: 5000 INJECTION, SOLUTION INTRAVENOUS; SUBCUTANEOUS at 05:11

## 2018-11-20 RX ADMIN — HYDRALAZINE HYDROCHLORIDE 75 MG: 50 TABLET ORAL at 08:11

## 2018-11-20 RX ADMIN — HYDRALAZINE HYDROCHLORIDE 75 MG: 50 TABLET ORAL at 01:11

## 2018-11-20 RX ADMIN — CARVEDILOL 25 MG: 25 TABLET, FILM COATED ORAL at 08:11

## 2018-11-20 NOTE — PLAN OF CARE
Problem: Patient Care Overview  Goal: Plan of Care Review  Outcome: Ongoing (interventions implemented as appropriate)  Pt tolerated meds and meals today, called wife several times per pt request for pt to talk to. Pt up with PT today, waiting on placement for pt, cbgs in progress, incontinence care in progress .POC reviewed with pt . All questions and concerns addressed. Pt  verbalized understanding but confused. Vital signs stable and Neuro assessment remains unchanged. Pt progressing towards goals. Individualized stroke book in view at bedside. For more info see flowsheets. Will continue to monitor.

## 2018-11-20 NOTE — SUBJECTIVE & OBJECTIVE
Neurologic Chief Complaint: L thalamic ICH    Subjective:     Interval History: Patient is seen for follow-up neurological assessment and treatment recommendations: NAEON, no new complaints    HPI, Past Medical, Family, and Social History remains the same as documented in the initial encounter.     Review of Systems   Constitutional: Negative for chills and fever.   Eyes: Negative for visual disturbance.   Respiratory: Negative for cough.    Cardiovascular: Negative for chest pain.   Gastrointestinal: Negative for nausea and vomiting.   Genitourinary: Negative for difficulty urinating.   Skin: Negative for rash.   Neurological: Positive for facial asymmetry, speech difficulty and weakness.     Scheduled Meds:   amLODIPine  10 mg Oral Daily    carvedilol  25 mg Oral BID    heparin (porcine)  5,000 Units Subcutaneous Q8H    hydrALAZINE  75 mg Oral Q8H    nicotine  1 patch Transdermal Daily    senna-docusate 8.6-50 mg  1 tablet Oral Daily    sodium chloride 0.9%  3 mL Intravenous Q8H     Continuous Infusions:  PRN Meds:dextrose 50%, glucagon (human recombinant), hydrALAZINE, insulin aspart U-100    Objective:     Vital Signs (Most Recent):  Temp: 98.1 °F (36.7 °C) (11/20/18 0615)  Pulse: 61 (11/20/18 0615)  Resp: 18 (11/20/18 0615)  BP: 134/70 (11/20/18 0615)  SpO2: 96 % (11/20/18 0615)  BP Location: Left arm    Vital Signs Range (Last 24H):  Temp:  [98 °F (36.7 °C)-98.5 °F (36.9 °C)]   Pulse:  [57-67]   Resp:  [18-20]   BP: (115-134)/(58-70)   SpO2:  [96 %-99 %]   BP Location: Left arm    Physical Exam   Constitutional: He is oriented to person, place, and time. He appears well-developed and well-nourished.   HENT:   Head: Normocephalic and atraumatic.   Eyes: EOM are normal. Pupils are equal, round, and reactive to light.   Cardiovascular: Bradycardia present.   Pulmonary/Chest: Effort normal. No respiratory distress.   Neurological: He is alert and oriented to person, place, and time.   Skin: Skin is warm and  dry.   Vitals reviewed.      Neurological Exam:   LOC: alert  Attention Span: Good   Language: No aphasia  Articulation: Dysarthria  Orientation: Person, Place, Time   Visual Fields: Full  EOM (CN III, IV, VI): Full/intact  Pupils (CN II, III): PERRL  Facial Movement (CN VII): Lower facial weakness on the Right  Motor: Arm left  Normal 5/5  Leg left  Normal 5/5  Arm right  Paresis: 3/5  Leg right Paresis: 4/5  Sensation: Intact to light touch, temperature and vibration  Tone: Normal tone throughout        Laboratory:  CMP:   Recent Labs   Lab 11/20/18  0503   CALCIUM 8.6*   ALBUMIN 2.6*   PROT 6.2      K 4.6   CO2 23      BUN 33*   CREATININE 1.3   ALKPHOS 65   ALT 22   AST 29   BILITOT 0.3     CBC:   Recent Labs   Lab 11/20/18  0503   WBC 5.26   RBC 3.66*   HGB 10.1*   HCT 32.3*      MCV 88   MCH 27.6   MCHC 31.3*     Lipid Panel:   Recent Labs   Lab 11/14/18  1103   CHOL 175   LDLCALC 98.6   HDL 62   TRIG 72     Coagulation:   Recent Labs   Lab 11/15/18  0100   INR 0.9   APTT 25.0     Platelet Aggregation Study: No results for input(s): PLTAGG, PLTAGINTERP, PLTAGREGLACO, ADPPLTAGGREG in the last 168 hours.  Hgb A1C:   Recent Labs   Lab 11/15/18  0100   HGBA1C 5.2     TSH:   Recent Labs   Lab 11/14/18  1103   TSH 1.222       Diagnostic Results     Brain imaging:  CT Head. Date: 11/14/18 1424  1. Grossly stable focus of intracranial hemorrhage centered in the region of the left basal ganglia.  There is stable to subtly increased surrounding edema.  This lies adjacent to a previous infarct.  Correlation advised, continued follow-up recommended.  2. Stable sequela of chronic microvascular ischemic change and senescent change.  3. Nonspecific punctate focus of low attenuation within the right cerebellum, age-indeterminate infarct is a consideration.        CT Head. Date: 11/14/18 1050  1. Acute left basal ganglia hemorrhage without midline shift or hydrocephalus.  Given the location of the hematoma  likely etiologies is hypertensive hemorrhage.  2. Extensive periventricular white matter changes in the centrum from chronic microvascular ischemia.  This report was flagged in Epic as abnormal.        Vessel Imaging:  none     Cardiac Evaluation:   Echo. Date: 11/15/18  · Normal left ventricular systolic function. The estimated ejection fraction is 65%  · Concentric left ventricular remodeling.  · Normal LV diastolic function.  · No wall motion abnormalities.  · Normal right ventricular systolic function.  · Mild left atrial enlargement.  · Normal central venous pressure (3 mm Hg).  · No pericardial effusion.

## 2018-11-20 NOTE — ASSESSMENT & PLAN NOTE
Stroke risk factor   SBP 220s on arrival  Required cardene  SBP <160 and at goal  Continue amlodipine 10, carvedilol 25, hydralazine 75  Not currently requiring PRN BP medications

## 2018-11-20 NOTE — PROGRESS NOTES
[facility]  Vascular Neurology  Comprehensive Stroke Center  Progress Note    Assessment/Plan:     * Nontraumatic subcortical hemorrhage of left cerebral hemisphere    Nikolas Callaway is a 73 y.o. 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 He was [place] [facility] [place] and admitted to neuro ICU. Etiology likely hypertensive ICH.       Antithrombotics for secondary stroke prevention: Antiplatelets: None: Intracerebral Hemorrhage    Statins for secondary stroke prevention and hyperlipidemia, if present:   Statins: None: Reason: ICH    Aggressive risk factor modification: HTN, Smoking     Rehab efforts: Occupational Therapy, PT/OT/SLP to evaluate and treat, PM&R consult  recommending inpatient rehab    Diagnostics ordered/pending: None     VTE prophylaxis: Heparin 5000 units SQ every 8 hours    BP parameters: ICH: SBP <160         Current smoker    Stroke risk factor  Encourage smoking cessation     Vasogenic cerebral edema    Area of vasogenic cerebral edema identified when reviewing brain imaging in the L [place]. There is not mass effect associated with it. We will continue to monitor the patients clinical exam for any worsening of symptoms which may indicate expansion of the hemorrhage or the area of the edema resulting in the clinical change. The ICH is likely 2/2 uncontrolled hypertension.       Stage 3 chronic kidney disease    Cr stable  Encouraging fluid intake     Right sided weakness    PT/OT and SLP to evaluate and treat  Plans for inpatient rehab     Dysarthria    2/2 ICH  SLP to evaluate and treat     Hypertension    Stroke risk factor   SBP 220s on arrival  Required cardene  SBP <160 and at goal  Continue amlodipine 10, carvedilol 25, hydralazine 75  Not currently requiring PRN BP medications          11/19 encourage fluid intake due to Cr, waiting rehab placement  11/20 Cr stable, waiting placement    STROKE DOCUMENTATION   Acute  Stroke Times   Last Known Normal Date: 11/14/18  Last Known Normal Time: 1030  Symptom Onset Date: 11/14/18  Symptom Onset Time: 1030  Stroke Team Called Date: 11/14/18  Stroke Team Called Time: 1055  Stroke Team Arrival Date: 11/14/18  Stroke Team Arrival Time: 1058  CT Interpretation Time: 1058  Decision to Treat Time for Alteplase: (No iv alteplase candidate)  Decision to Treat Time for IR: (No IR candidate)    NIH Scale:  1a. Level Of Consciousness: 0-->Alert: keenly responsive  1b. LOC Questions: 0-->Answers both questions correctly  1c. LOC Commands: 0-->Performs both tasks correctly  2. Best Gaze: 0-->Normal  3. Visual: 0-->No visual loss  4. Facial Palsy: 1-->Minor paralysis (flattened nasolabial fold, asymmetry on smiling)  5a. Motor Arm, Left: 0-->No drift: limb holds 90 (or 45) degrees for full 10 secs  5b. Motor Arm, Right: 2-->Some effort against gravity: limb cannot get to or maintain (if cued) 90 (or 45) degrees, drifts down to bed, but has some effort against gravity  6a. Motor Leg, Left: 0-->No drift: leg holds 30 degree position for full 5 secs  6b. Motor Leg, Right: 2-->Some effort against gravity: leg falls to bed by 5 secs, but has some effort against gravity  7. Limb Ataxia: 0-->Absent  8. Sensory: 0-->Normal: no sensory loss  9. Best Language: 0-->No aphasia: normal  10. Dysarthria: 1-->Mild-to-moderate dysarthria: patient slurs at least some words and, at worst, can be understood with some difficulty  11. Extinction and Inattention (formerly Neglect): 0-->No abnormality  Total (NIH Stroke Scale): 6       Modified Congers Score: 0  [place] Coma Scale:    ABCD2 Score:    [id number] Score:   HAS -BLED Score:   ICH Score:0  Hunt & Parnell Classification:      Hemorrhagic change of an Ischemic Stroke: Does this patient have an ischemic stroke with hemorrhagic changes? No     Neurologic Chief Complaint: L thalamic ICH    Subjective:     Interval History: Patient is seen for follow-up neurological  assessment and treatment recommendations: NAEON, no new complaints    HPI, Past Medical, Family, and Social History remains the same as documented in the initial encounter.     Review of Systems   Constitutional: Negative for chills and fever.   Eyes: Negative for visual disturbance.   Respiratory: Negative for cough.    Cardiovascular: Negative for chest pain.   Gastrointestinal: Negative for nausea and vomiting.   Genitourinary: Negative for difficulty urinating.   Skin: Negative for rash.   Neurological: Positive for facial asymmetry, speech difficulty and weakness.     Scheduled Meds:   amLODIPine  10 mg Oral Daily    carvedilol  25 mg Oral BID    heparin (porcine)  5,000 Units Subcutaneous Q8H    hydrALAZINE  75 mg Oral Q8H    nicotine  1 patch Transdermal Daily    senna-docusate 8.6-50 mg  1 tablet Oral Daily    sodium chloride 0.9%  3 mL Intravenous Q8H     Continuous Infusions:  PRN Meds:dextrose 50%, glucagon (human recombinant), hydrALAZINE, insulin aspart U-100    Objective:     Vital Signs (Most Recent):  Temp: 98.1 °F (36.7 °C) (11/20/18 0615)  Pulse: 61 (11/20/18 0615)  Resp: 18 (11/20/18 0615)  BP: 134/70 (11/20/18 0615)  SpO2: 96 % (11/20/18 0615)  BP Location: Left arm    Vital Signs Range (Last 24H):  Temp:  [98 °F (36.7 °C)-98.5 °F (36.9 °C)]   Pulse:  [57-67]   Resp:  [18-20]   BP: (115-134)/(58-70)   SpO2:  [96 %-99 %]   BP Location: Left arm    Physical Exam   Constitutional: He is oriented to person, place, and time. He appears well-developed and well-nourished.   HENT:   Head: Normocephalic and atraumatic.   Eyes: EOM are normal. Pupils are equal, round, and reactive to light.   Cardiovascular: Bradycardia present.   Pulmonary/Chest: Effort normal. No respiratory distress.   Neurological: He is alert and oriented to person, place, and time.   Skin: Skin is warm and dry.   Vitals reviewed.      Neurological Exam:   LOC: alert  Attention Span: Good   Language: No aphasia  Articulation:  Dysarthria  Orientation: Person, Place, Time   Visual Fields: Full  EOM (CN III, IV, VI): Full/intact  Pupils (CN II, III): PERRL  Facial Movement (CN VII): Lower facial weakness on the Right  Motor: Arm left  Normal 5/5  Leg left  Normal 5/5  Arm right  Paresis: 3/5  Leg right Paresis: 4/5  Sensation: Intact to light touch, temperature and vibration  Tone: Normal tone throughout        Laboratory:  CMP:   Recent Labs   Lab 11/20/18  0503   CALCIUM 8.6*   ALBUMIN 2.6*   PROT 6.2      K 4.6   CO2 23      BUN 33*   CREATININE 1.3   ALKPHOS 65   ALT 22   AST 29   BILITOT 0.3     CBC:   Recent Labs   Lab 11/20/18  0503   WBC 5.26   RBC 3.66*   HGB 10.1*   HCT 32.3*      MCV 88   MCH 27.6   MCHC 31.3*     Lipid Panel:   Recent Labs   Lab 11/14/18  1103   CHOL 175   LDLCALC 98.6   HDL 62   TRIG 72     Coagulation:   Recent Labs   Lab 11/15/18  0100   INR 0.9   APTT 25.0     Platelet Aggregation Study: No results for input(s): PLTAGG, PLTAGINTERP, PLTAGREGLACO, ADPPLTAGGREG in the last 168 hours.  Hgb A1C:   Recent Labs   Lab 11/15/18  0100   HGBA1C 5.2     TSH:   Recent Labs   Lab 11/14/18  1103   TSH 1.222       Diagnostic Results     Brain imaging:  CT Head. Date: 11/14/18 1424  1. Grossly stable focus of intracranial hemorrhage centered in the region of the left basal ganglia.  There is stable to subtly increased surrounding edema.  This lies adjacent to a previous infarct.  Correlation advised, continued follow-up recommended.  2. Stable sequela of chronic microvascular ischemic change and senescent change.  3. Nonspecific punctate focus of low attenuation within the right cerebellum, age-indeterminate infarct is a consideration.        CT Head. Date: 11/14/18 1050  1. Acute left basal ganglia hemorrhage without midline shift or hydrocephalus.  Given the location of the hematoma likely etiologies is hypertensive hemorrhage.  2. Extensive periventricular white matter changes in the centrum from  chronic microvascular ischemia.  This report was flagged in Epic as abnormal.        Vessel Imaging:  none     Cardiac Evaluation:   Echo. Date: 11/15/18  · Normal left ventricular systolic function. The estimated ejection fraction is 65%  · Concentric left ventricular remodeling.  · Normal LV diastolic function.  · No wall motion abnormalities.  · Normal right ventricular systolic function.  · Mild left atrial enlargement.  · Normal central venous pressure (3 mm Hg).  · No pericardial effusion.                     Annemarie Cadet PA-C  Comprehensive Stroke Center  Department of Vascular Neurology   [facility]

## 2018-11-20 NOTE — PLAN OF CARE
Problem: Patient Care Overview  Goal: Plan of Care Review  Recommendations     Recommendation/Intervention: 1. Continue mechanical soft diet as tolerated. 2. Provide Boost Plus TID.  Goals: 1. Pt to tolerate and consume >75% meals.    Assessment and Plan     Nutrition Problem  Swallowing difficulty     Related to (etiology):   stroke     Signs and Symptoms (as evidenced by):   Pt on mechanical soft diet.

## 2018-11-20 NOTE — PLAN OF CARE
Pt lying supine in bed, hob 30 degrees, pt calm, comfortable, no complaints of pain. Pt has significant right-sided weakness, particularly rue. Pt condom catheter in place. Will continue to monitor pt neuro status q4h, await physical therapy eval and progress, and await potential pt discharge to snf recommendations.

## 2018-11-20 NOTE — PLAN OF CARE
Problem: Occupational Therapy Goal  Goal: Occupational Therapy Goal  Goals set 11/19 to be addressed for 7 days with expiration date, 11/26:  Patient will increase functional independence with ADLs by performing:    Patient will demonstrate rolling to the right with modified independence.  Not met   Patient will demonstrate rolling to the left with CGA.   Not met  Patient will demonstrate supine -sit with min assist.   Not met  Patient will demonstrate stand pivot transfers with min assist.   Not met  Patient will demonstrate grooming while standing with mod assist.   Not met  Patient will demonstrate upper body dressing with mod assist while seated EOB.   Not met  Patient will demonstrate lower body dressing with mod assist while seated EOB.   Not met  Patient will demonstrate toileting with mod assist.   Not met  Patient will demonstrate bathing while seated EOB with mod assist.   Not met  Patient's family / caregiver will demonstrate independence and safety with assisting patient with self-care skills and functional mobility.     Not met  Patient's family / caregiver will demonstrate independence with providing ROM and changes in bed positioning.   Not met  Patient and/or patient's family will verbalize understanding of stroke prevention guidelines, personal risk factors and stroke warning signs via teachback method.  Not met           Goals remain appropriate.  JAIRO Tellez  11/20/2018

## 2018-11-20 NOTE — PT/OT/SLP PROGRESS
Physical Therapy Treatment    Patient Name:  Nikolas Callaway   MRN:  [id number]    Recommendations:     Discharge Recommendations:  rehabilitation facility   Discharge Equipment Recommendations: 3-in-1 commode, bath bench   Barriers to discharge: Inaccessible home and Decreased caregiver support    Assessment:     Nikolas Callaway is a 73 y.o. male admitted with a medical diagnosis of Nontraumatic subcortical hemorrhage of left cerebral hemisphere.  He presents with the following impairments/functional limitations:  weakness, impaired endurance, impaired self care skills, impaired functional mobilty, gait instability, impaired balance, abnormal tone, decreased lower extremity function, decreased upper extremity function, decreased safety awareness, impaired fine motor, impaired coordination . Patient presents with inconsistent postural control in sitting and the R knee had to be blocked while in standing to prevent from buckling.    Rehab Prognosis:  fair; patient would benefit from acute skilled PT services to address these deficits and reach maximum level of function.      Recent Surgery: * No surgery found *      Plan:     During this hospitalization, patient to be seen 5 x/week to address the above listed problems via gait training, therapeutic activities, therapeutic exercises, neuromuscular re-education  · Plan of Care Expires:  12/15/18   Plan of Care Reviewed with: patient    Subjective     Communicated with nsg prior to session.  Patient found with HOB elevated upon PT entry to room, agreeable to treatment.      Chief Complaint: weakness  Patient comments/goals:  To get stronger  Pain/Comfort:  · Pain Rating 1: 0/10  · Pain Rating Post-Intervention 1: 0/10    Patients cultural, spiritual, Bahai conflicts given the current situation: Orthodox    Objective:     Patient found with: peripheral IV, telemetry, SCD, bed alarm, Condom Catheter     General Precautions: Standard, aspiration, fall   Orthopedic  Precautions:N/A   Braces: N/A     Functional Mobility:  · Bed Mobility:     · Rolling Left:  moderate assistance  · Scooting: maximal assistance  · Supine to Sit: moderate assistance  · Sit to Supine: moderate assistance  · Transfers:     · Sit to Stand:  maximal assistance with hand-held assist  · Balance: fair in sitting and poor in standing.      AM-PAC 6 CLICK MOBILITY  Turning over in bed (including adjusting bedclothes, sheets and blankets)?: 2  Sitting down on and standing up from a chair with arms (e.g., wheelchair, bedside commode, etc.): 2  Moving from lying on back to sitting on the side of the bed?: 2  Moving to and from a bed to a chair (including a wheelchair)?: 2  Need to walk in hospital room?: 1  Climbing 3-5 steps with a railing?: 1  Basic Mobility Total Score: 10       Therapeutic Activities and Exercises:   Patient sat at EOB x 12 minutes with min to close SBA. Static standing and weight shifting activity 2x45 secs with min to mod assistance and blocking the R knee. R LE PROM and L LE AAROM x 30 reps on all available planes of motion. Donned SCD's after treatment and elevated B heels on a pillow to prevent Decubitus.    Patient left with bed in chair position with all lines intact, call button in reach and bed alarm on..    GOALS:   Multidisciplinary Problems     Physical Therapy Goals        Problem: Physical Therapy Goal    Goal Priority Disciplines Outcome Goal Variances Interventions   Physical Therapy Goal     PT, PT/OT Ongoing (interventions implemented as appropriate)     Description:    Goals to be met by 11/25    1. Pt will perform rolling to the R and L with SBA.   2. Pt will perform supine to sit from both sides of the bed with min assistance.  3. Pt will perform sit to supine with min assistance.  4. Pt will perform sit to stand transfers with moderate assistance.    5. Pt will perform bed <> chair transfers with moderate assistance.  6. Pt will perform gait x 10 feet with max  assistance.  7. Pt will sit EOB x 5 minutes with SBA and no LOB while performing dynamic UE tasks to prepare for functional tasks in sitting.                       Time Tracking:     PT Received On: 11/20/18  PT Start Time: 1005     PT Stop Time: 1030  PT Total Time (min): 25 min     Billable Minutes: Therapeutic Activity 16 and Therapeutic Exercise 9    Treatment Type: Treatment  PT/PTA: PTA     PTA Visit Number: 1     Boby Seay PTA  11/20/2018

## 2018-11-20 NOTE — PLAN OF CARE
Problem: Physical Therapy Goal  Goal: Physical Therapy Goal    Goals to be met by 11/25    1. Pt will perform rolling to the R and L with SBA.   2. Pt will perform supine to sit from both sides of the bed with min assistance.  3. Pt will perform sit to supine with min assistance.  4. Pt will perform sit to stand transfers with moderate assistance.    5. Pt will perform bed <> chair transfers with moderate assistance.  6. Pt will perform gait x 10 feet with max assistance.  7. Pt will sit EOB x 5 minutes with SBA and no LOB while performing dynamic UE tasks to prepare for functional tasks in sitting.      Outcome: Ongoing (interventions implemented as appropriate)  Patient continues to show limited mobility due to R hemiparesis, but shows good disposition and the ability to follow motor commands.

## 2018-11-20 NOTE — PLAN OF CARE
Accepted to [facility] pending insurance auth from [employer].      11/20/18 0924   Post-Acute Status   Post-Acute Authorization Placement   Post-Acute Placement Status Pending Payor Review

## 2018-11-20 NOTE — PT/OT/SLP PROGRESS
"Occupational Therapy   Treatment    Name: Nikolas Callaway  MRN: [id number]  Admitting Diagnosis:  Nontraumatic subcortical hemorrhage of left cerebral hemisphere       Recommendations:     Discharge Recommendations: rehabilitation facility  Discharge Equipment Recommendations:  3-in-1 commode, bath bench  Barriers to discharge:  Inaccessible home environment, Decreased caregiver support    Subjective   Patient:  "I hope to go to rehab soon."  Pain/Comfort:  · Pain Rating 1: 0/10  · Pain Rating Post-Intervention 1: 0/10    Objective:     Communicated with: Nurse prior to session.  Patient found with all lines intact, call button in reach and bed alarm on and peripheral IV, telemetry, SCD, bed alarm, Condom Catheter upon OT entry to room.  Family not present.   General Precautions: Standard, aspiration, fall   Orthopedic Precautions:N/A   Braces: N/A     Occupational Performance:    Bed Mobility:    · Patient completed Rolling/Turning to Right with stand by assistance  · Patient completed Scooting/Bridging with moderate assistance  · Patient completed Supine to Sit with moderate assistance  · Patient completed Sit to Supine with moderate assistance     Functional Mobility/Transfers:  · Patient completed Bed <> Chair Transfer using Stand Pivot technique with maximal assistance with no assistive device    Activities of Daily Living:  · Grooming: maximal assistance while standing  · Upper Body Dressing: maximal assistance while seated EOB  · Lower Body Dressing: total assistance while seated EOB  Lateral trunk flexion on the right    AMPA 6 Click ADL: 11    Treatment & Education:  Patient education provided for stroke warning signs, prevention guidelines and personal risk factors.  Patient education provided on role of OT and need for rehab upon discharge.    Continued education, patient/ family training recommended.  Daily orientation provided.  LILIANOM performed right UE/LEs one set x 10 rep in all planes of motion with " stretches provided at end range; sustained stretch provided for ankle dorsiflexion, external rotation, supination and shoulder flexion within patient's pain tolerance.  Assistance and facilitation provided for upward rotation of the scapula during shoulder flexion and abduction.    Able to follow 4/4 one step commands.  Patient attentive and interactive throughout the session.  Oral motor ex. performed while seated.  Mod assist required with postural control while seated EOB; increased lateral trunk flexion noted on the right. Addressed left UE weight bearing while seated EOB.  Positioning provided for midline orientation with bilateral UEs elevated and heels lifted off mattress.   Family not present during the session.  Patient's functional status and disposition recommendation discussed with patient and patient's nurse.  White board updated in patient's room.  OT asked if there were any other questions; patient/ family had no further questions    Patient left supine with all lines intact, call button in reach and bed alarm on  Education:    Assessment:     Nikolas Callaway is a 73 y.o. male with a medical diagnosis of Nontraumatic subcortical hemorrhage of left cerebral hemisphere.  He presents with the following performance deficits affecting function are weakness, impaired endurance, impaired sensation, impaired self care skills, impaired functional mobilty, gait instability, impaired balance, visual deficits, impaired cognition, decreased coordination, decreased upper extremity function, decreased lower extremity function, decreased safety awareness, abnormal tone, decreased ROM, impaired coordination, impaired fine motor.     Rehab Prognosis:  Good; patient would benefit from acute skilled OT services to address these deficits and reach maximum level of function.       Plan:     Patient to be seen 4 x/week to address the above listed problems via self-care/home management, therapeutic activities, therapeutic  exercises, neuromuscular re-education, cognitive retraining, sensory integration  · Plan of Care Expires: 12/13/18  · Plan of Care Reviewed with: patient    This Plan of care has been discussed with the patient who was involved in its development and understands and is in agreement with the identified goals and treatment plan    GOALS:   Multidisciplinary Problems     Occupational Therapy Goals        Problem: Occupational Therapy Goal    Goal Priority Disciplines Outcome Interventions   Occupational Therapy Goal     OT, PT/OT     Description:  Goals set 11/19 to be addressed for 7 days with expiration date, 11/26:  Patient will increase functional independence with ADLs by performing:    Patient will demonstrate rolling to the right with modified independence.  Not met   Patient will demonstrate rolling to the left with CGA.   Not met  Patient will demonstrate supine -sit with min assist.   Not met  Patient will demonstrate stand pivot transfers with min assist.   Not met  Patient will demonstrate grooming while standing with mod assist.   Not met  Patient will demonstrate upper body dressing with mod assist while seated EOB.   Not met  Patient will demonstrate lower body dressing with mod assist while seated EOB.   Not met  Patient will demonstrate toileting with mod assist.   Not met  Patient will demonstrate bathing while seated EOB with mod assist.   Not met  Patient's family / caregiver will demonstrate independence and safety with assisting patient with self-care skills and functional mobility.     Not met  Patient's family / caregiver will demonstrate independence with providing ROM and changes in bed positioning.   Not met  Patient and/or patient's family will verbalize understanding of stroke prevention guidelines, personal risk factors and stroke warning signs via teachback method.  Not met                            Time Tracking:     OT Date of Treatment: 11/20/18  OT Start Time: 0636  OT Stop Time:  0700  OT Total Time (min): 24 min    Billable Minutes:Self Care/Home Management 12  Neuromuscular Re-education 12    JAIRO Tellez  11/20/2018

## 2018-11-21 LAB
ALBUMIN SERPL BCP-MCNC: 2.6 G/DL
ALP SERPL-CCNC: 64 U/L
ALT SERPL W/O P-5'-P-CCNC: 21 U/L
ANION GAP SERPL CALC-SCNC: 5 MMOL/L
AST SERPL-CCNC: 24 U/L
BASOPHILS # BLD AUTO: 0.02 K/UL
BASOPHILS NFR BLD: 0.5 %
BILIRUB SERPL-MCNC: 0.4 MG/DL
BUN SERPL-MCNC: 32 MG/DL
CALCIUM SERPL-MCNC: 8.7 MG/DL
CHLORIDE SERPL-SCNC: 108 MMOL/L
CO2 SERPL-SCNC: 24 MMOL/L
CREAT SERPL-MCNC: 1.5 MG/DL
DIFFERENTIAL METHOD: ABNORMAL
EOSINOPHIL # BLD AUTO: 0.1 K/UL
EOSINOPHIL NFR BLD: 1.6 %
ERYTHROCYTE [DISTWIDTH] IN BLOOD BY AUTOMATED COUNT: 14.4 %
EST. GFR  (AFRICAN AMERICAN): 53 ML/MIN/1.73 M^2
EST. GFR  (NON AFRICAN AMERICAN): 45.9 ML/MIN/1.73 M^2
GLUCOSE SERPL-MCNC: 98 MG/DL
HCT VFR BLD AUTO: 31.7 %
HGB BLD-MCNC: 9.7 G/DL
IMM GRANULOCYTES # BLD AUTO: 0 K/UL
IMM GRANULOCYTES NFR BLD AUTO: 0 %
LYMPHOCYTES # BLD AUTO: 2.3 K/UL
LYMPHOCYTES NFR BLD: 52.2 %
MAGNESIUM SERPL-MCNC: 2.2 MG/DL
MCH RBC QN AUTO: 27.6 PG
MCHC RBC AUTO-ENTMCNC: 30.6 G/DL
MCV RBC AUTO: 90 FL
MONOCYTES # BLD AUTO: 0.5 K/UL
MONOCYTES NFR BLD: 11.8 %
NEUTROPHILS # BLD AUTO: 1.5 K/UL
NEUTROPHILS NFR BLD: 33.9 %
NRBC BLD-RTO: 0 /100 WBC
PHOSPHATE SERPL-MCNC: 4.4 MG/DL
PLATELET # BLD AUTO: 188 K/UL
PMV BLD AUTO: 10.6 FL
POCT GLUCOSE: 107 MG/DL (ref 70–110)
POCT GLUCOSE: 121 MG/DL (ref 70–110)
POCT GLUCOSE: 141 MG/DL (ref 70–110)
POTASSIUM SERPL-SCNC: 4.6 MMOL/L
PROT SERPL-MCNC: 6.2 G/DL
RBC # BLD AUTO: 3.52 M/UL
SODIUM SERPL-SCNC: 137 MMOL/L
WBC # BLD AUTO: 4.33 K/UL

## 2018-11-21 PROCEDURE — 25000003 PHARM REV CODE 250: Performed by: PHYSICIAN ASSISTANT

## 2018-11-21 PROCEDURE — 83735 ASSAY OF MAGNESIUM: CPT

## 2018-11-21 PROCEDURE — 63600175 PHARM REV CODE 636 W HCPCS: Performed by: NURSE PRACTITIONER

## 2018-11-21 PROCEDURE — A4216 STERILE WATER/SALINE, 10 ML: HCPCS | Performed by: STUDENT IN AN ORGANIZED HEALTH CARE EDUCATION/TRAINING PROGRAM

## 2018-11-21 PROCEDURE — 36415 COLL VENOUS BLD VENIPUNCTURE: CPT

## 2018-11-21 PROCEDURE — 80053 COMPREHEN METABOLIC PANEL: CPT

## 2018-11-21 PROCEDURE — 84100 ASSAY OF PHOSPHORUS: CPT

## 2018-11-21 PROCEDURE — 85025 COMPLETE CBC W/AUTO DIFF WBC: CPT

## 2018-11-21 PROCEDURE — 92507 TX SP LANG VOICE COMM INDIV: CPT

## 2018-11-21 PROCEDURE — S4991 NICOTINE PATCH NONLEGEND: HCPCS | Performed by: NURSE PRACTITIONER

## 2018-11-21 PROCEDURE — 25000003 PHARM REV CODE 250: Performed by: STUDENT IN AN ORGANIZED HEALTH CARE EDUCATION/TRAINING PROGRAM

## 2018-11-21 PROCEDURE — 99233 SBSQ HOSP IP/OBS HIGH 50: CPT | Mod: ,,, | Performed by: PSYCHIATRY & NEUROLOGY

## 2018-11-21 PROCEDURE — 25000003 PHARM REV CODE 250: Performed by: NURSE PRACTITIONER

## 2018-11-21 PROCEDURE — 97112 NEUROMUSCULAR REEDUCATION: CPT

## 2018-11-21 PROCEDURE — 97530 THERAPEUTIC ACTIVITIES: CPT

## 2018-11-21 PROCEDURE — 20600001 HC STEP DOWN PRIVATE ROOM

## 2018-11-21 RX ADMIN — Medication 3 ML: at 03:11

## 2018-11-21 RX ADMIN — HYDRALAZINE HYDROCHLORIDE 75 MG: 50 TABLET ORAL at 06:11

## 2018-11-21 RX ADMIN — NICOTINE 1 PATCH: 21 PATCH, EXTENDED RELEASE TRANSDERMAL at 08:11

## 2018-11-21 RX ADMIN — HEPARIN SODIUM 5000 UNITS: 5000 INJECTION, SOLUTION INTRAVENOUS; SUBCUTANEOUS at 06:11

## 2018-11-21 RX ADMIN — HYDRALAZINE HYDROCHLORIDE 75 MG: 50 TABLET ORAL at 09:11

## 2018-11-21 RX ADMIN — HEPARIN SODIUM 5000 UNITS: 5000 INJECTION, SOLUTION INTRAVENOUS; SUBCUTANEOUS at 10:11

## 2018-11-21 RX ADMIN — CARVEDILOL 25 MG: 25 TABLET, FILM COATED ORAL at 08:11

## 2018-11-21 RX ADMIN — HYDRALAZINE HYDROCHLORIDE 75 MG: 50 TABLET ORAL at 02:11

## 2018-11-21 RX ADMIN — Medication 3 ML: at 10:11

## 2018-11-21 RX ADMIN — HEPARIN SODIUM 5000 UNITS: 5000 INJECTION, SOLUTION INTRAVENOUS; SUBCUTANEOUS at 02:11

## 2018-11-21 RX ADMIN — Medication 3 ML: at 06:11

## 2018-11-21 RX ADMIN — AMLODIPINE BESYLATE 10 MG: 10 TABLET ORAL at 08:11

## 2018-11-21 RX ADMIN — STANDARDIZED SENNA CONCENTRATE AND DOCUSATE SODIUM 1 TABLET: 8.6; 5 TABLET, FILM COATED ORAL at 08:11

## 2018-11-21 RX ADMIN — SODIUM CHLORIDE 500 ML: 0.9 INJECTION, SOLUTION INTRAVENOUS at 11:11

## 2018-11-21 NOTE — PROGRESS NOTES
[facility]  Vascular Neurology  Comprehensive Stroke Center  Progress Note    Assessment/Plan:     * Nontraumatic subcortical hemorrhage of left cerebral hemisphere    Nikolas Callaway is a 73 y.o. 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 He was transferred to [facility] for further care and admitted to neuro ICU. Etiology likely hypertensive ICH.     Pending insurance auth for rehab.      Antithrombotics for secondary stroke prevention: Antiplatelets: None: Intracerebral Hemorrhage    Statins for secondary stroke prevention and hyperlipidemia, if present:   Statins: None: Reason: ICH    Aggressive risk factor modification: HTN, Smoking     Rehab efforts: PT/OT/SLP to evaluate and treat, PM&R consult  recommending inpatient rehab    Diagnostics ordered/pending: None     VTE prophylaxis: Heparin 5000 units SQ every 8 hours    BP parameters: ICH: SBP <160         Vasogenic cerebral edema    Area of vasogenic cerebral edema identified when reviewing brain imaging in the L [place]. There is not mass effect associated with it. We will continue to monitor the patients clinical exam for any worsening of symptoms which may indicate expansion of the hemorrhage or the area of the edema resulting in the clinical change. The ICH is likely 2/2 uncontrolled hypertension.       Hypertension    Stroke risk factor   SBP 220s on arrival, required cardene  SBP <160, currently at goal  Continue amlodipine 10, carvedilol 25, hydralazine 75 for now  Pt on Lisinopril at home. Will consider modifying above regimen inpatient vs outpatient in setting of fluctuating Cr     Current smoker    Stroke risk factor  Nicotine patch  Encourage smoking cessation     Stage 3 chronic kidney disease    Stroke risk factor  Cr mildly elevated; Given NS bolus 11/21  Will encourage PO fluid intake     Right sided weakness    Result of stroke  PT/OT and SLP to evaluate and treat  Plans for inpatient  rehab     Dysarthria    2/2 ICH  SLP to evaluate and treat          11/19 encourage fluid intake due to Cr, waiting rehab placement  11/20 Cr stable, waiting placement  11/21: Cr mildly elevated; ordered NS bolus. Pending insurance auth for rehab.    STROKE DOCUMENTATION   Acute Stroke Times   Last Known Normal Date: 11/14/18  Last Known Normal Time: 1030  Symptom Onset Date: 11/14/18  Symptom Onset Time: 1030  Stroke Team Called Date: 11/14/18  Stroke Team Called Time: 1055  Stroke Team Arrival Date: 11/14/18  Stroke Team Arrival Time: 1058  CT Interpretation Time: 1058  Decision to Treat Time for Alteplase: (No iv alteplase candidate)  Decision to Treat Time for IR: (No IR candidate)    NIH Scale:  1a. Level Of Consciousness: 0-->Alert: keenly responsive  1b. LOC Questions: 0-->Answers both questions correctly  1c. LOC Commands: 0-->Performs both tasks correctly  2. Best Gaze: 0-->Normal  3. Visual: 0-->No visual loss  4. Facial Palsy: 1-->Minor paralysis (flattened nasolabial fold, asymmetry on smiling)  5a. Motor Arm, Left: 0-->No drift: limb holds 90 (or 45) degrees for full 10 secs  5b. Motor Arm, Right: 1-->Drift: limb holds 90 (or 45) degrees, but drifts down before full 10 secs: does not hit bed or other support  6a. Motor Leg, Left: 0-->No drift: leg holds 30 degree position for full 5 secs  6b. Motor Leg, Right: 1-->Drift: leg falls by the end of the 5-sec period but does not hit bed  7. Limb Ataxia: 0-->Absent  8. Sensory: 0-->Normal: no sensory loss  9. Best Language: 0-->No aphasia: normal  10. Dysarthria: 1-->Mild-to-moderate dysarthria: patient slurs at least some words and, at worst, can be understood with some difficulty  11. Extinction and Inattention (formerly Neglect): 0-->No abnormality  Total (NIH Stroke Scale): 4       Modified Stanley Score: 0  [place] Coma Scale:    ABCD2 Score:    [id number] Score:   HAS -BLED Score:   ICH Score:0  Hunt & Parnell Classification:      Hemorrhagic change of an  Ischemic Stroke: Does this patient have an ischemic stroke with hemorrhagic changes? No     Neurologic Chief Complaint: L thalamic ICH    Subjective:     Interval History: Patient is seen for follow-up neurological assessment and treatment recommendations: NAEON. Cr mildly elevated; ordered NS bolus. Pending insurance auth for rehab.    HPI, Past Medical, Family, and Social History remains the same as documented in the initial encounter.     Review of Systems   Constitutional: Negative for chills and fever.   Gastrointestinal: Negative for nausea and vomiting.   Neurological: Positive for facial asymmetry, speech difficulty and weakness.   Psychiatric/Behavioral: Negative for agitation and behavioral problems.     Scheduled Meds:   amLODIPine  10 mg Oral Daily    carvedilol  25 mg Oral BID    heparin (porcine)  5,000 Units Subcutaneous Q8H    hydrALAZINE  75 mg Oral Q8H    nicotine  1 patch Transdermal Daily    senna-docusate 8.6-50 mg  1 tablet Oral Daily    sodium chloride 0.9%  3 mL Intravenous Q8H     Continuous Infusions:  PRN Meds:dextrose 50%, glucagon (human recombinant), hydrALAZINE, insulin aspart U-100    Objective:     Vital Signs (Most Recent):  Temp: 98.1 °F (36.7 °C) (11/21/18 1119)  Pulse: (!) 56 (11/21/18 1119)  Resp: 17 (11/21/18 1119)  BP: (!) 108/55 (11/21/18 1119)  SpO2: 98 % (11/21/18 1119)  BP Location: Left arm    Vital Signs Range (Last 24H):  Temp:  [97.6 °F (36.4 °C)-98.6 °F (37 °C)]   Pulse:  [54-65]   Resp:  [17-18]   BP: (101-153)/(53-72)   SpO2:  [93 %-98 %]   BP Location: Left arm    Physical Exam   Constitutional: He is oriented to person, place, and time. He appears well-developed and well-nourished. No distress.   HENT:   Head: Normocephalic and atraumatic.   Eyes: EOM are normal. No scleral icterus.   Cardiovascular: Bradycardia present.   Pulmonary/Chest: Effort normal. No respiratory distress.   Musculoskeletal: He exhibits no edema or deformity.   Neurological: He is  alert and oriented to person, place, and time. A cranial nerve deficit is present. No sensory deficit.   Skin: Skin is warm and dry.   Psychiatric: He has a normal mood and affect. His behavior is normal.   Vitals reviewed.      Neurological Exam:   LOC: alert  Attention Span: Good   Language: No aphasia  Articulation: Dysarthria  Orientation: Person, Place, Time   Visual Fields: Full  EOM (CN III, IV, VI): Full/intact  Facial Movement (CN VII): Lower facial weakness on the Right  Motor: Arm left  Normal 5/5  Leg left  Normal 5/5  Arm right  Paresis: 3/5  Leg right Paresis: 4/5  Sensation: Intact to light touch, temperature   Tone: Normal tone throughout        Laboratory:  CMP:   Recent Labs   Lab 11/21/18  0405   CALCIUM 8.7   ALBUMIN 2.6*   PROT 6.2      K 4.6   CO2 24      BUN 32*   CREATININE 1.5*   ALKPHOS 64   ALT 21   AST 24   BILITOT 0.4     CBC:   Recent Labs   Lab 11/21/18  0405   WBC 4.33   RBC 3.52*   HGB 9.7*   HCT 31.7*      MCV 90   MCH 27.6   MCHC 30.6*     Lipid Panel:   No results for input(s): CHOL, LDLCALC, HDL, TRIG in the last 168 hours.  Coagulation:   Recent Labs   Lab 11/15/18  0100   INR 0.9   APTT 25.0     Platelet Aggregation Study: No results for input(s): PLTAGG, PLTAGINTERP, PLTAGREGLACO, ADPPLTAGGREG in the last 168 hours.  Hgb A1C:   Recent Labs   Lab 11/15/18  0100   HGBA1C 5.2     TSH:   No results for input(s): TSH in the last 168 hours.    Diagnostic Results     Brain imaging:  CT Head. Date: 11/14/18 1424  1. Grossly stable focus of intracranial hemorrhage centered in the region of the left basal ganglia.  There is stable to subtly increased surrounding edema.  This lies adjacent to a previous infarct.  Correlation advised, continued follow-up recommended.  2. Stable sequela of chronic microvascular ischemic change and senescent change.  3. Nonspecific punctate focus of low attenuation within the right cerebellum, age-indeterminate infarct is a  consideration.        CT Head. Date: 11/14/18 1050  1. Acute left basal ganglia hemorrhage without midline shift or hydrocephalus.  Given the location of the hematoma likely etiologies is hypertensive hemorrhage.  2. Extensive periventricular white matter changes in the centrum from chronic microvascular ischemia.  This report was flagged in Epic as abnormal.        Vessel Imaging:  none     Cardiac Evaluation:   Echo. Date: 11/15/18  · Normal left ventricular systolic function. The estimated ejection fraction is 65%  · Concentric left ventricular remodeling.  · Normal LV diastolic function.  · No wall motion abnormalities.  · Normal right ventricular systolic function.  · Mild left atrial enlargement.  · Normal central venous pressure (3 mm Hg).  · No pericardial effusion.             Deanne Flor PA-C  Comprehensive Stroke Center  Department of Vascular Neurology   [facility]

## 2018-11-21 NOTE — PLAN OF CARE
Problem: Physical Therapy Goal  Goal: Physical Therapy Goal    Goals to be met by 11/25    1. Pt will perform rolling to the R and L with SBA. Not met  2. Pt will perform supine to sit from both sides of the bed with min assistance.Not met  3. Pt will perform sit to supine with min assistance. Not met  4. Pt will perform sit to stand transfers with moderate assistance.   Not met  5. Pt will perform bed <> chair transfers with moderate assistance.Not met  6. Pt will perform gait x 10 feet with max assistance.Not met  7. Pt will sit EOB x 5 minutes with SBA and no LOB while performing dynamic UE tasks to prepare for functional tasks in sitting.  Not met     Outcome: Ongoing (interventions implemented as appropriate)  Moving toward meeting his goals. No goals met today. Continue plan of care.

## 2018-11-21 NOTE — PLAN OF CARE
Problem: SLP Goal  Goal: SLP Goal  Speech Language Pathology Goals  Goals expected to be met by 11/22      Pt will tolerate diet of  thin liquids and mechanical soft solids, solids] without overt clinical signs of aspiration   Pt will participate in trials of regular solids within speech therapy sessions to help determine least restrictive diet   Pt will complete OMEs x10 w/ SLP model to enhance oral motor function   Pt will participate in ongoing assessment of speech language and cognitive linguistic skills to help rule out deficits and determine therapeutic plan of care        Outcome: Ongoing (interventions implemented as appropriate)  Pt ongoing with current goals. ST to continue to follow. Patient with handout of OMEs at bedside for HEP.     SUKUMAR Hoyos., [facility]-SLP  Speech-Language Pathology  Pager: [phone]  11/21/2018

## 2018-11-21 NOTE — ASSESSMENT & PLAN NOTE
Stroke risk factor   SBP 220s on arrival, required cardene  SBP <160, currently at goal  Continue amlodipine 10, carvedilol 25, hydralazine 75 for now  Pt on Lisinopril at home. Will consider modifying above regimen inpatient vs outpatient in setting of fluctuating Cr

## 2018-11-21 NOTE — PLAN OF CARE
Notified by [facility] that they received insurance auth. But they will not be able to accept patient until Friday. Notified team.     11/21/18 0666   Post-Acute Status   Post-Acute Authorization Placement   Post-Acute Placement Status Authorization Obtained

## 2018-11-21 NOTE — SUBJECTIVE & OBJECTIVE
Neurologic Chief Complaint: L thalamic ICH    Subjective:     Interval History: Patient is seen for follow-up neurological assessment and treatment recommendations: NAEON. Cr mildly elevated; ordered NS bolus. Pending insurance auth for rehab.    HPI, Past Medical, Family, and Social History remains the same as documented in the initial encounter.     Review of Systems   Constitutional: Negative for chills and fever.   Gastrointestinal: Negative for nausea and vomiting.   Neurological: Positive for facial asymmetry, speech difficulty and weakness.   Psychiatric/Behavioral: Negative for agitation and behavioral problems.     Scheduled Meds:   amLODIPine  10 mg Oral Daily    carvedilol  25 mg Oral BID    heparin (porcine)  5,000 Units Subcutaneous Q8H    hydrALAZINE  75 mg Oral Q8H    nicotine  1 patch Transdermal Daily    senna-docusate 8.6-50 mg  1 tablet Oral Daily    sodium chloride 0.9%  3 mL Intravenous Q8H     Continuous Infusions:  PRN Meds:dextrose 50%, glucagon (human recombinant), hydrALAZINE, insulin aspart U-100    Objective:     Vital Signs (Most Recent):  Temp: 98.1 °F (36.7 °C) (11/21/18 1119)  Pulse: (!) 56 (11/21/18 1119)  Resp: 17 (11/21/18 1119)  BP: (!) 108/55 (11/21/18 1119)  SpO2: 98 % (11/21/18 1119)  BP Location: Left arm    Vital Signs Range (Last 24H):  Temp:  [97.6 °F (36.4 °C)-98.6 °F (37 °C)]   Pulse:  [54-65]   Resp:  [17-18]   BP: (101-153)/(53-72)   SpO2:  [93 %-98 %]   BP Location: Left arm    Physical Exam   Constitutional: He is oriented to person, place, and time. He appears well-developed and well-nourished. No distress.   HENT:   Head: Normocephalic and atraumatic.   Eyes: EOM are normal. No scleral icterus.   Cardiovascular: Bradycardia present.   Pulmonary/Chest: Effort normal. No respiratory distress.   Musculoskeletal: He exhibits no edema or deformity.   Neurological: He is alert and oriented to person, place, and time. A cranial nerve deficit is present. No sensory  deficit.   Skin: Skin is warm and dry.   Psychiatric: He has a normal mood and affect. His behavior is normal.   Vitals reviewed.      Neurological Exam:   LOC: alert  Attention Span: Good   Language: No aphasia  Articulation: Dysarthria  Orientation: Person, Place, Time   Visual Fields: Full  EOM (CN III, IV, VI): Full/intact  Facial Movement (CN VII): Lower facial weakness on the Right  Motor: Arm left  Normal 5/5  Leg left  Normal 5/5  Arm right  Paresis: 3/5  Leg right Paresis: 4/5  Sensation: Intact to light touch, temperature   Tone: Normal tone throughout        Laboratory:  CMP:   Recent Labs   Lab 11/21/18  0405   CALCIUM 8.7   ALBUMIN 2.6*   PROT 6.2      K 4.6   CO2 24      BUN 32*   CREATININE 1.5*   ALKPHOS 64   ALT 21   AST 24   BILITOT 0.4     CBC:   Recent Labs   Lab 11/21/18  0405   WBC 4.33   RBC 3.52*   HGB 9.7*   HCT 31.7*      MCV 90   MCH 27.6   MCHC 30.6*     Lipid Panel:   No results for input(s): CHOL, LDLCALC, HDL, TRIG in the last 168 hours.  Coagulation:   Recent Labs   Lab 11/15/18  0100   INR 0.9   APTT 25.0     Platelet Aggregation Study: No results for input(s): PLTAGG, PLTAGINTERP, PLTAGREGLACO, ADPPLTAGGREG in the last 168 hours.  Hgb A1C:   Recent Labs   Lab 11/15/18  0100   HGBA1C 5.2     TSH:   No results for input(s): TSH in the last 168 hours.    Diagnostic Results     Brain imaging:  CT Head. Date: 11/14/18 1424  1. Grossly stable focus of intracranial hemorrhage centered in the region of the left basal ganglia.  There is stable to subtly increased surrounding edema.  This lies adjacent to a previous infarct.  Correlation advised, continued follow-up recommended.  2. Stable sequela of chronic microvascular ischemic change and senescent change.  3. Nonspecific punctate focus of low attenuation within the right cerebellum, age-indeterminate infarct is a consideration.        CT Head. Date: 11/14/18 1050  1. Acute left basal ganglia hemorrhage without midline  shift or hydrocephalus.  Given the location of the hematoma likely etiologies is hypertensive hemorrhage.  2. Extensive periventricular white matter changes in the centrum from chronic microvascular ischemia.  This report was flagged in Epic as abnormal.        Vessel Imaging:  none     Cardiac Evaluation:   Echo. Date: 11/15/18  · Normal left ventricular systolic function. The estimated ejection fraction is 65%  · Concentric left ventricular remodeling.  · Normal LV diastolic function.  · No wall motion abnormalities.  · Normal right ventricular systolic function.  · Mild left atrial enlargement.  · Normal central venous pressure (3 mm Hg).  · No pericardial effusion.

## 2018-11-21 NOTE — PT/OT/SLP PROGRESS
"Speech Language Pathology Treatment    Patient Name:  Nikolas Callaway   MRN:  [id number]  Admitting Diagnosis: Nontraumatic subcortical hemorrhage of left cerebral hemisphere    Recommendations:                 General Recommendations:  Dysphagia therapy and Speech/language therapy  Diet recommendations:  Mechanical soft, Liquid Diet Level: Thin   Aspiration Precautions: 1 bite/sip at a time, Alternating bites/sips, Check for pocketing/oral residue, Eliminate distractions, Feed only when awake/alert, Frequent oral care, HOB to 90 degrees, Meds crushed in puree, Small bites/sips and Strict aspiration precautions   Continue to monitor for signs and symptoms of aspiration and discontinue oral feeding should you notice any of the following: watery eyes, reddened facial area, wet vocal quality, increased work of breathing, change in respiratory status, increased congestion, coughing, fever, etc.  General Precautions: Standard, aspiration, fall  Communication strategies:  provide increased time to answer and go to room if call light pushed    Subjective     Pt presents calm  He explains, "I guess I just slow down" when asked about speech strategies  He denies pain    Pain/Comfort:  · Pain Rating 1: 0/10    Objective:     Has the patient been evaluated by SLP for swallowing?   Yes  Keep patient NPO? No   Current Respiratory Status: room air      Pt seen for speech therapy. Pt found awake in bed, with CNA at bedside to assess vitals. He denied difficulty with meals and politely declined PO trials 2/2 feeling full from breakfast tray. Patient with mildly decreased articulatory precision and decreased vocal intensity at the spontaneous speech tasks. He was educated on SLP role, oral care, ongoing HEP for OMES and clear speech strategies. Patient recalled up to 2 clear speech strategies immediately following review by SLP. He completed reps of labial/buccal OMEs x5 each with set-up. Handout for OMEs at bedside for ongoing practice " outside of scheduled tx time. No questions noted. Whiteboard current. Pt remained in bed, with call light in reach and bed alarm intact, as SLP exited room.     Assessment:     Nikolas Callaway is a 73 y.o. male with an SLP diagnosis of Dysphagia and Dysarthria.  He presents with good participation with OMEs this service day. ST to continue to follow.     Goals:   Multidisciplinary Problems     SLP Goals        Problem: SLP Goal    Goal Priority Disciplines Outcome   SLP Goal     SLP Ongoing (interventions implemented as appropriate)   Description:  Speech Language Pathology Goals  Goals expected to be met by 11/22      Pt will tolerate diet of  thin liquids and mechanical soft solids, solids] without overt clinical signs of aspiration   Pt will participate in trials of regular solids within speech therapy sessions to help determine least restrictive diet   Pt will complete OMEs x10 w/ SLP model to enhance oral motor function   Pt will participate in ongoing assessment of speech language and cognitive linguistic skills to help rule out deficits and determine therapeutic plan of care                         Plan:     · Patient to be seen:  3 x/week   · Plan of Care expires:  12/14/18  · Plan of Care reviewed with:  patient   · SLP Follow-Up:  Yes       Discharge recommendations:  rehabilitation facility     Time Tracking:     SLP Treatment Date:   11/21/18  Speech Start Time:  1116  Speech Stop Time:  1126     Speech Total Time (min):  10 min    Billable Minutes: Speech Therapy Individual 10    JINA Hoyos, [facility]-SLP  Speech-Language Pathology  Pager: [phone]      11/21/2018

## 2018-11-21 NOTE — PT/OT/SLP PROGRESS
Physical Therapy Treatment    Patient Name:  Nikolas Callaway   MRN:  [id number]    Recommendations:     Discharge Recommendations:  rehabilitation facility   Discharge Equipment Recommendations: 3-in-1 commode, bath bench   Barriers to discharge: Inaccessible home and Decreased caregiver support    Assessment:     Nikolas Callaway is a 73 y.o. male admitted with a medical diagnosis of Nontraumatic subcortical hemorrhage of left cerebral hemisphere.  He presents with the following impairments/functional limitations:  weakness, impaired endurance, impaired self care skills, impaired functional mobilty, gait instability, impaired balance, decreased lower extremity function, decreased upper extremity function.  Mr. Callaway was able to stand three times today (~1 minute each) but required Max A x 1-2 people to maintain his standing balance, as he has a right lateral lean with decreased stability noted in RLE as well as a forward flexed trunk.  Pt was noted to have mobility in both his RUE and RLE with decreased proprioceptive awareness in the both.  He was able to place his RUE on the bed (with active assistance), but his hand would slide off the bed shortly after placement on the bed.    Rehab Prognosis:  fair; patient would benefit from acute skilled PT services to address these deficits and reach maximum level of function.      Recent Surgery: * No surgery found *      Plan:     During this hospitalization, patient to be seen 5 x/week to address the above listed problems via gait training, therapeutic activities, therapeutic exercises, neuromuscular re-education  · Plan of Care Expires:  12/15/18   Plan of Care Reviewed with: patient    Subjective     Communicated with nurse prior to session.  Patient found supine/HOB elevated with bed alarm upon PT entry to room, agreeable to treatment.      Chief Complaint: none  Patient comments/goals: To go to rehab.  Pain/Comfort:  · Pain Rating 1: 0/10    Patients cultural, spiritual,  Sikhism conflicts given the current situation: [place]    Objective:     Patient found with: Condom Catheter, bed alarm, telemetry     General Precautions: Standard, aspiration, fall   Orthopedic Precautions:N/A   Braces: N/A     Functional Mobility:  · Bed Mobility:  Scooting (at edge of bed): moderate assistance, scooting on bottom- assisted with forward flexion, anterior weight shift and was able to lift his bottom off of the edge of bed closer to head of bed.  · Supine to Sit: moderate assistance provided for trunk and RUE  · Sit to Supine: moderate assistance provided for trunk and RUE  · Transfers:  Sit to Stand:  maximal assistance and of 1 persons with no AD (assisted from front)  · Standing Balance: Max A x 1-2 people due to R lateral lean and decreased proprioception in RLE, resulting in R knee buckle. Also noted to have collapsed trunk. Pt was given cues to lift chest, extend thoracic and lumbar spine, tighten RLE in standing via knee extension and to shift his weight over toward his R side with R knee blocked.  · Seated balance: Pt required SBA-CGA for static sitting and CGA-Mk for dynamic sitting balance. Sat edge of bed for 18 minutes total.      AM-PAC 6 CLICK MOBILITY  Turning over in bed (including adjusting bedclothes, sheets and blankets)?: 2  Sitting down on and standing up from a chair with arms (e.g., wheelchair, bedside commode, etc.): 2  Moving from lying on back to sitting on the side of the bed?: 2  Moving to and from a bed to a chair (including a wheelchair)?: 2  Need to walk in hospital room?: 1  Climbing 3-5 steps with a railing?: 1  Basic Mobility Total Score: 10       Therapeutic Activities and Exercises:  · Performed long arc quads x 10 reps BLE, pelvic tilts x 10 reps each (anterior/posterior, lateral) with manual cueing/facilitation provided for R hip (lateral pelvic tilt and forward motion for scooting toward edge of bed- able to do this with cueing).  · Pt stood three times x  "1 minute each with assistance as noted above.  · Pt able to cross midline with his gaze and was able to fully rotate his head to each side x 3 trials upon command, naming objects in his R and L visual fields correctly.  · Educated about increasing the use of his R upper and lower extremities so as to "wake them up" or increase his proprioceptive sense as well as his functional use.    Patient left HOB elevated with call button in reach, heel floated, SCDs donned, bed alarm on.    GOALS:   Multidisciplinary Problems     Physical Therapy Goals        Problem: Physical Therapy Goal    Goal Priority Disciplines Outcome Goal Variances Interventions   Physical Therapy Goal     PT, PT/OT Ongoing (interventions implemented as appropriate)     Description:    Goals to be met by 11/25    1. Pt will perform rolling to the R and L with SBA. Not met  2. Pt will perform supine to sit from both sides of the bed with min assistance.Not met  3. Pt will perform sit to supine with min assistance. Not met  4. Pt will perform sit to stand transfers with moderate assistance.   Not met  5. Pt will perform bed <> chair transfers with moderate assistance.Not met  6. Pt will perform gait x 10 feet with max assistance.Not met  7. Pt will sit EOB x 5 minutes with SBA and no LOB while performing dynamic UE tasks to prepare for functional tasks in sitting.  Not met                       Time Tracking:     PT Received On: 11/21/18  PT Start Time: 0845     PT Stop Time: 0911  PT Total Time (min): 26 min     Billable Minutes: Therapeutic Activity 15 and Neuromuscular Re-education 11    Treatment Type: Treatment  PT/PTA: PT     PTA Visit Number: 0     Chelsey Mckeon, PT  11/21/2018  "

## 2018-11-21 NOTE — ASSESSMENT & PLAN NOTE
Nikolas Callaway is a 73 y.o. 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 [place] [facility] [place] to neuro ICU. Etiology likely hypertensive ICH.     Pending insurance auth for rehab.      Antithrombotics for secondary stroke prevention: Antiplatelets: None: Intracerebral Hemorrhage    Statins for secondary stroke prevention and hyperlipidemia, if present:   Statins: None: Reason: ICH    Aggressive risk factor modification: HTN, Smoking     Rehab efforts: PT/OT/SLP to evaluate and treat, PM&R consult  recommending inpatient rehab    Diagnostics ordered/pending: None     VTE prophylaxis: Heparin 5000 units SQ every 8 hours    BP parameters: ICH: SBP <160

## 2018-11-22 PROBLEM — I63.02 THROMBOTIC STROKE INVOLVING BASILAR ARTERY: Status: ACTIVE | Noted: 2018-11-22

## 2018-11-22 LAB
ALBUMIN SERPL BCP-MCNC: 2.6 G/DL
ALP SERPL-CCNC: 69 U/L
ALT SERPL W/O P-5'-P-CCNC: 20 U/L
ANION GAP SERPL CALC-SCNC: 8 MMOL/L
AST SERPL-CCNC: 24 U/L
BASOPHILS # BLD AUTO: 0.02 K/UL
BASOPHILS NFR BLD: 0.5 %
BILIRUB SERPL-MCNC: 0.3 MG/DL
BUN SERPL-MCNC: 30 MG/DL
CALCIUM SERPL-MCNC: 8.9 MG/DL
CHLORIDE SERPL-SCNC: 108 MMOL/L
CO2 SERPL-SCNC: 22 MMOL/L
CREAT SERPL-MCNC: 1.2 MG/DL
DIFFERENTIAL METHOD: ABNORMAL
EOSINOPHIL # BLD AUTO: 0.1 K/UL
EOSINOPHIL NFR BLD: 1.6 %
ERYTHROCYTE [DISTWIDTH] IN BLOOD BY AUTOMATED COUNT: 14 %
EST. GFR  (AFRICAN AMERICAN): >60 ML/MIN/1.73 M^2
EST. GFR  (NON AFRICAN AMERICAN): >60 ML/MIN/1.73 M^2
GLUCOSE SERPL-MCNC: 89 MG/DL
HCT VFR BLD AUTO: 32 %
HGB BLD-MCNC: 10.2 G/DL
IMM GRANULOCYTES # BLD AUTO: 0.01 K/UL
IMM GRANULOCYTES NFR BLD AUTO: 0.2 %
LYMPHOCYTES # BLD AUTO: 2.2 K/UL
LYMPHOCYTES NFR BLD: 51.1 %
MAGNESIUM SERPL-MCNC: 1.9 MG/DL
MCH RBC QN AUTO: 28.1 PG
MCHC RBC AUTO-ENTMCNC: 31.9 G/DL
MCV RBC AUTO: 88 FL
MONOCYTES # BLD AUTO: 0.4 K/UL
MONOCYTES NFR BLD: 9.6 %
NEUTROPHILS # BLD AUTO: 1.6 K/UL
NEUTROPHILS NFR BLD: 37 %
NRBC BLD-RTO: 0 /100 WBC
PHOSPHATE SERPL-MCNC: 3.7 MG/DL
PLATELET # BLD AUTO: 207 K/UL
PMV BLD AUTO: 11.1 FL
POCT GLUCOSE: 164 MG/DL (ref 70–110)
POCT GLUCOSE: 177 MG/DL (ref 70–110)
POCT GLUCOSE: 91 MG/DL (ref 70–110)
POTASSIUM SERPL-SCNC: 4.9 MMOL/L
PROT SERPL-MCNC: 6.2 G/DL
RBC # BLD AUTO: 3.63 M/UL
SODIUM SERPL-SCNC: 138 MMOL/L
WBC # BLD AUTO: 4.27 K/UL

## 2018-11-22 PROCEDURE — 25000003 PHARM REV CODE 250: Performed by: NURSE PRACTITIONER

## 2018-11-22 PROCEDURE — 63600175 PHARM REV CODE 636 W HCPCS: Performed by: PSYCHIATRY & NEUROLOGY

## 2018-11-22 PROCEDURE — S4991 NICOTINE PATCH NONLEGEND: HCPCS | Performed by: NURSE PRACTITIONER

## 2018-11-22 PROCEDURE — A4216 STERILE WATER/SALINE, 10 ML: HCPCS | Performed by: STUDENT IN AN ORGANIZED HEALTH CARE EDUCATION/TRAINING PROGRAM

## 2018-11-22 PROCEDURE — 85025 COMPLETE CBC W/AUTO DIFF WBC: CPT

## 2018-11-22 PROCEDURE — 84100 ASSAY OF PHOSPHORUS: CPT

## 2018-11-22 PROCEDURE — 20600001 HC STEP DOWN PRIVATE ROOM

## 2018-11-22 PROCEDURE — 83735 ASSAY OF MAGNESIUM: CPT

## 2018-11-22 PROCEDURE — 25000003 PHARM REV CODE 250: Performed by: PHYSICIAN ASSISTANT

## 2018-11-22 PROCEDURE — 80053 COMPREHEN METABOLIC PANEL: CPT

## 2018-11-22 PROCEDURE — 25000003 PHARM REV CODE 250: Performed by: STUDENT IN AN ORGANIZED HEALTH CARE EDUCATION/TRAINING PROGRAM

## 2018-11-22 PROCEDURE — 36415 COLL VENOUS BLD VENIPUNCTURE: CPT

## 2018-11-22 PROCEDURE — 99233 SBSQ HOSP IP/OBS HIGH 50: CPT | Mod: ,,, | Performed by: PSYCHIATRY & NEUROLOGY

## 2018-11-22 PROCEDURE — 63600175 PHARM REV CODE 636 W HCPCS: Performed by: NURSE PRACTITIONER

## 2018-11-22 RX ORDER — ATORVASTATIN CALCIUM 20 MG/1
40 TABLET, FILM COATED ORAL DAILY
Status: DISCONTINUED | OUTPATIENT
Start: 2018-11-22 | End: 2018-11-23 | Stop reason: HOSPADM

## 2018-11-22 RX ORDER — ASPIRIN 325 MG
325 TABLET ORAL DAILY
Status: DISCONTINUED | OUTPATIENT
Start: 2018-11-22 | End: 2018-11-23 | Stop reason: HOSPADM

## 2018-11-22 RX ADMIN — ASPIRIN 325 MG ORAL TABLET 325 MG: 325 PILL ORAL at 03:11

## 2018-11-22 RX ADMIN — HEPARIN SODIUM 5000 UNITS: 5000 INJECTION, SOLUTION INTRAVENOUS; SUBCUTANEOUS at 09:11

## 2018-11-22 RX ADMIN — CARVEDILOL 25 MG: 25 TABLET, FILM COATED ORAL at 09:11

## 2018-11-22 RX ADMIN — HYDRALAZINE HYDROCHLORIDE 75 MG: 50 TABLET ORAL at 09:11

## 2018-11-22 RX ADMIN — STANDARDIZED SENNA CONCENTRATE AND DOCUSATE SODIUM 1 TABLET: 8.6; 5 TABLET, FILM COATED ORAL at 08:11

## 2018-11-22 RX ADMIN — Medication 3 ML: at 03:11

## 2018-11-22 RX ADMIN — INSULIN ASPART 2 UNITS: 100 INJECTION, SOLUTION INTRAVENOUS; SUBCUTANEOUS at 08:11

## 2018-11-22 RX ADMIN — AMLODIPINE BESYLATE 10 MG: 10 TABLET ORAL at 08:11

## 2018-11-22 RX ADMIN — HEPARIN SODIUM 5000 UNITS: 5000 INJECTION, SOLUTION INTRAVENOUS; SUBCUTANEOUS at 02:11

## 2018-11-22 RX ADMIN — ATORVASTATIN CALCIUM 40 MG: 20 TABLET, FILM COATED ORAL at 03:11

## 2018-11-22 RX ADMIN — HYDRALAZINE HYDROCHLORIDE 75 MG: 50 TABLET ORAL at 05:11

## 2018-11-22 RX ADMIN — Medication 3 ML: at 05:11

## 2018-11-22 RX ADMIN — HYDRALAZINE HYDROCHLORIDE 75 MG: 50 TABLET ORAL at 02:11

## 2018-11-22 RX ADMIN — CARVEDILOL 25 MG: 25 TABLET, FILM COATED ORAL at 08:11

## 2018-11-22 RX ADMIN — NICOTINE 1 PATCH: 21 PATCH, EXTENDED RELEASE TRANSDERMAL at 08:11

## 2018-11-22 RX ADMIN — HEPARIN SODIUM 5000 UNITS: 5000 INJECTION, SOLUTION INTRAVENOUS; SUBCUTANEOUS at 05:11

## 2018-11-22 RX ADMIN — Medication 3 ML: at 10:11

## 2018-11-22 NOTE — ASSESSMENT & PLAN NOTE
Stroke risk factor  Cr mildly elevated; Given NS bolus 11/21. Renal labs WNL 11/22  Encouraged PO fluid intake

## 2018-11-22 NOTE — PROGRESS NOTES
[facility]  Vascular Neurology  Comprehensive Stroke Center  Progress Note    Assessment/Plan:     * Nontraumatic subcortical hemorrhage of left cerebral hemisphere    Nikolas Callaway is a 73 y.o. 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 He was transferred to [facility] [place] for further care and admitted to neuro ICU. Etiology possibly hypertensive ICH.     MRI Brain 11/22 showed acute R pontine stroke. MRA WNL. Started ASA, statin. This broadens the stroke differential to include ischemic stroke with hemorrhagic conversion, likely 2/2 small vessel disease. Echo also with mild LAE however so will consider 30-day cardiac event monitoring following rehab d/c.    Likely d/c to rehab 11/23.      Antithrombotics for secondary stroke prevention: Antiplatelets: Aspirin: 325 mg daily  Statins for secondary stroke prevention and hyperlipidemia, if present:   Statins: Atorvastatin- 40 mg daily  Aggressive risk factor modification: HTN, Smoking, HLD, Diet, Exercise  Rehab efforts: PT/OT/SLP to evaluate and treat, PM&R consult  recommending inpatient rehab  Diagnostics ordered/pending: None   VTE prophylaxis: Heparin 5000 units SQ every 8 hours, SCDs  BP parameters: ICH: SBP <160     Thrombotic stroke involving basilar artery    Seen on MRI Brain 11/22   See primary problem above     Vasogenic cerebral edema    Area of vasogenic and cytotoxic cerebral edema identified when reviewing brain imaging in the L thalamus and in the territory of the R perforating vessels of the basilar artery, respectfully. There is not mass effect associated with it. We will continue to monitor the patients clinical exam for any worsening of symptoms which may indicate expansion of the hemorrhage or the area of the edema resulting in the clinical change. The ICH is likely 2/2 uncontrolled hypertension, though cannot rule out hemorrhagic conversion of an ischemic stroke due to small vessel disease.      Hypertension    Stroke risk factor   SBP 220s on arrival, required cardene  SBP <160, currently at goal  Continue amlodipine 10, carvedilol 25, hydralazine 75 for now  Pt on Lisinopril at home. Will consider modifying above regimen inpatient vs outpatient in setting of fluctuating Cr     Current smoker    Stroke risk factor  Nicotine patch  Encourage smoking cessation     Stage 3 chronic kidney disease    Stroke risk factor  Cr mildly elevated; Given NS bolus 11/21. Renal labs WNL 11/22  Encouraged PO fluid intake     Right sided weakness    Result of stroke  PT/OT and SLP to evaluate and treat  Plans for inpatient rehab     Dysarthria    2/2 ICH  SLP to evaluate and treat          11/19 encourage fluid intake due to Cr, waiting rehab placement  11/20 Cr stable, waiting placement  11/21: Cr mildly elevated; ordered NS bolus. Pending insurance auth for rehab.  11/22: MRI/MRA completed; started ASA, statin. Cr stabilized. Likely d/c to rehab tomorrow.    STROKE DOCUMENTATION   Acute Stroke Times   Last Known Normal Date: 11/14/18  Last Known Normal Time: 1030  Symptom Onset Date: 11/14/18  Symptom Onset Time: 1030  Stroke Team Called Date: 11/14/18  Stroke Team Called Time: 1055  Stroke Team Arrival Date: 11/14/18  Stroke Team Arrival Time: 1058  CT Interpretation Time: 1058  Decision to Treat Time for Alteplase: (No iv alteplase candidate)  Decision to Treat Time for IR: (No IR candidate)    NIH Scale:  1a. Level Of Consciousness: 0-->Alert: keenly responsive  1b. LOC Questions: 0-->Answers both questions correctly  1c. LOC Commands: 0-->Performs both tasks correctly  2. Best Gaze: 0-->Normal  3. Visual: 0-->No visual loss  4. Facial Palsy: 1-->Minor paralysis (flattened nasolabial fold, asymmetry on smiling)  5a. Motor Arm, Left: 0-->No drift: limb holds 90 (or 45) degrees for full 10 secs  5b. Motor Arm, Right: 1-->Drift: limb holds 90 (or 45) degrees, but drifts down before full 10 secs: does not hit bed or  other support  6a. Motor Leg, Left: 0-->No drift: leg holds 30 degree position for full 5 secs  6b. Motor Leg, Right: 1-->Drift: leg falls by the end of the 5-sec period but does not hit bed  7. Limb Ataxia: 0-->Absent  8. Sensory: 0-->Normal: no sensory loss  9. Best Language: 0-->No aphasia: normal  10. Dysarthria: 1-->Mild-to-moderate dysarthria: patient slurs at least some words and, at worst, can be understood with some difficulty  11. Extinction and Inattention (formerly Neglect): 0-->No abnormality  Total (NIH Stroke Scale): 4       Modified [place] Score: 0  [place] Coma Scale:    ABCD2 Score:    [id number] Score:   HAS -BLED Score:   ICH Score:0  Hunt & Parnell Classification:      Hemorrhagic change of an Ischemic Stroke: Does this patient have an ischemic stroke with hemorrhagic changes? No  -- Unclear at this time    Neurologic Chief Complaint: L thalamic ICH    Subjective:     Interval History: Patient is seen for follow-up neurological assessment and treatment recommendations: NAEON. MRI/MRA completed; started ASA, statin. Cr stabilized. Likely d/c to rehab tomorrow.    HPI, Past Medical, Family, and Social History remains the same as documented in the initial encounter.     Review of Systems   Constitutional: Negative for chills and fever.   Gastrointestinal: Negative for nausea and vomiting.   Neurological: Positive for facial asymmetry, speech difficulty and weakness.   Psychiatric/Behavioral: Negative for agitation and behavioral problems.     Scheduled Meds:   amLODIPine  10 mg Oral Daily    aspirin  325 mg Oral Daily    atorvastatin  40 mg Oral Daily    carvedilol  25 mg Oral BID    heparin (porcine)  5,000 Units Subcutaneous Q8H    hydrALAZINE  75 mg Oral Q8H    nicotine  1 patch Transdermal Daily    senna-docusate 8.6-50 mg  1 tablet Oral Daily    sodium chloride 0.9%  3 mL Intravenous Q8H     Continuous Infusions:  PRN Meds:dextrose 50%, glucagon (human recombinant), hydrALAZINE,  insulin aspart U-100    Objective:     Vital Signs (Most Recent):  Temp: 98.1 °F (36.7 °C) (11/22/18 1120)  Pulse: (!) 54 (11/22/18 1144)  Resp: 17 (11/22/18 1120)  BP: 133/70 (11/22/18 1120)  SpO2: 98 % (11/22/18 1120)  BP Location: Left arm    Vital Signs Range (Last 24H):  Temp:  [96.7 °F (35.9 °C)-98.3 °F (36.8 °C)]   Pulse:  [52-63]   Resp:  [17-20]   BP: (118-146)/(57-74)   SpO2:  [95 %-99 %]   BP Location: Left arm    Physical Exam   Constitutional: He is oriented to person, place, and time. He appears well-developed and well-nourished. No distress.   HENT:   Head: Normocephalic and atraumatic.   Eyes: EOM are normal. No scleral icterus.   Cardiovascular: Bradycardia present.   Pulmonary/Chest: Effort normal. No respiratory distress.   Musculoskeletal: He exhibits no edema or deformity.   Neurological: He is alert and oriented to person, place, and time. A cranial nerve deficit is present. No sensory deficit.   Skin: Skin is warm and dry.   Psychiatric: He has a normal mood and affect. His behavior is normal.   Vitals reviewed.      Neurological Exam:   LOC: alert  Attention Span: Good   Language: No aphasia  Articulation: Dysarthria  Orientation: Person, Place, Time   Visual Fields: Full  EOM (CN III, IV, VI): Full/intact  Facial Movement (CN VII): Lower facial weakness on the Right  Motor: Arm left  Normal 5/5  Leg left  Normal 5/5  Arm right  Paresis: 3/5  Leg right Paresis: 4/5  Sensation: Intact to light touch, temperature   Tone: Normal tone throughout        Laboratory:  CMP:   Recent Labs   Lab 11/22/18  0358   CALCIUM 8.9   ALBUMIN 2.6*   PROT 6.2      K 4.9   CO2 22*      BUN 30*   CREATININE 1.2   ALKPHOS 69   ALT 20   AST 24   BILITOT 0.3     CBC:   Recent Labs   Lab 11/22/18  0358   WBC 4.27   RBC 3.63*   HGB 10.2*   HCT 32.0*      MCV 88   MCH 28.1   MCHC 31.9*     Lipid Panel:   No results for input(s): CHOL, LDLCALC, HDL, TRIG in the last 168 hours.  Coagulation:   No  results for input(s): PT, INR, APTT in the last 168 hours.  Platelet Aggregation Study: No results for input(s): PLTAGG, PLTAGINTERP, PLTAGREGLACO, ADPPLTAGGREG in the last 168 hours.  Hgb A1C:   No results for input(s): HGBA1C in the last 168 hours.  TSH:   No results for input(s): TSH in the last 168 hours.    Diagnostic Results     Brain/Vessel imaging:    MRI Brain, MRA Brain/Neck 11/22/18    No hemodynamically significant stenosis, aneurysmal dilatation, or dissection identified within the intra or extracranial circulation.  Grossly stable left basal ganglia hemorrhage without significant increase in surrounding edema.  No new hemorrhage.  Acute infarction of the right belinda, suspected to have been present on 11/14/2018.  Remote infarctions of the left basal ganglia and right cerebellum.  Findings compatible with moderate to severe degree of chronic microvascular ischemic change.    CT Head. Date: 11/14/18 1424  1. Grossly stable focus of intracranial hemorrhage centered in the region of the left basal ganglia.  There is stable to subtly increased surrounding edema.  This lies adjacent to a previous infarct.  Correlation advised, continued follow-up recommended.  2. Stable sequela of chronic microvascular ischemic change and senescent change.  3. Nonspecific punctate focus of low attenuation within the right cerebellum, age-indeterminate infarct is a consideration.     CT Head. Date: 11/14/18 1050  1. Acute left basal ganglia hemorrhage without midline shift or hydrocephalus.  Given the location of the hematoma likely etiologies is hypertensive hemorrhage.  2. Extensive periventricular white matter changes in the centrum from chronic microvascular ischemia.  This report was flagged in Epic as abnormal.       Cardiac Evaluation:   Echo. Date: 11/15/18  · Normal left ventricular systolic function. The estimated ejection fraction is 65%  · Concentric left ventricular remodeling.  · Normal LV diastolic  function.  · No wall motion abnormalities.  · Normal right ventricular systolic function.  · Mild left atrial enlargement.  · Normal central venous pressure (3 mm Hg).  · No pericardial effusion.      Deanne Flor PA-C  [facility]  Department of Vascular Neurology   [facility]

## 2018-11-22 NOTE — PLAN OF CARE
Problem: Patient Care Overview  Goal: Plan of Care Review  Outcome: Ongoing (interventions implemented as appropriate)  POC reviewed with pt at bedside.  Verbalized understanding.  VSS.  Blood glucose monitored.  Incont and pericare provided.  No distress observed throughout the shift.  No complains of pain.  No falls.  Bed locked and in lowest position.  Will continue to monitor.

## 2018-11-22 NOTE — ASSESSMENT & PLAN NOTE
Nikolas Callaway is a 73 y.o. 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 He was transferred to [facility] [place] for further care and admitted to neuro ICU. Etiology possibly hypertensive ICH.     MRI Brain 11/22 showed acute R pontine stroke. MRA WNL. Started ASA, statin. This broadens the stroke differential to include ischemic stroke with hemorrhagic conversion, likely 2/2 small vessel disease. Echo also with mild LAE however so will consider 30-day cardiac event monitoring following rehab d/c.    Likely d/c to rehab 11/23.      Antithrombotics for secondary stroke prevention: Antiplatelets: Aspirin: 325 mg daily  Statins for secondary stroke prevention and hyperlipidemia, if present:   Statins: Atorvastatin- 40 mg daily  Aggressive risk factor modification: HTN, Smoking, HLD, Diet, Exercise  Rehab efforts: PT/OT/SLP to evaluate and treat, PM&R consult  recommending inpatient rehab  Diagnostics ordered/pending: None   VTE prophylaxis: Heparin 5000 units SQ every 8 hours, SCDs  BP parameters: ICH: SBP <160

## 2018-11-22 NOTE — PLAN OF CARE
Problem: Patient Care Overview  Goal: Plan of Care Review  Outcome: Ongoing (interventions implemented as appropriate)  Patient remains free from injury or fall.no neuro changes noted or reported. Cardiac rhythm and blood glucose monitored.MRI completed today. Plan= Possible DC to Rehab in AM. Will continue to monitor.

## 2018-11-22 NOTE — ASSESSMENT & PLAN NOTE
Area of vasogenic and cytotoxic cerebral edema identified when reviewing brain imaging in the L thalamus and in the territory of the R perforating vessels of the basilar artery, respectfully. There is not mass effect associated with it. We will continue to monitor the patients clinical exam for any worsening of symptoms which may indicate expansion of the hemorrhage or the area of the edema resulting in the clinical change. The ICH is likely 2/2 uncontrolled hypertension, though cannot rule out hemorrhagic conversion of an ischemic stroke due to small vessel disease.

## 2018-11-23 VITALS
DIASTOLIC BLOOD PRESSURE: 69 MMHG | HEART RATE: 54 BPM | SYSTOLIC BLOOD PRESSURE: 129 MMHG | BODY MASS INDEX: 20.11 KG/M2 | OXYGEN SATURATION: 96 % | HEIGHT: 69 IN | WEIGHT: 135.81 LBS | TEMPERATURE: 98 F | RESPIRATION RATE: 17 BRPM

## 2018-11-23 DIAGNOSIS — I62.9 INTRACRANIAL HEMORRHAGE: ICD-10-CM

## 2018-11-23 PROBLEM — E78.5 HLD (HYPERLIPIDEMIA): Status: ACTIVE | Noted: 2018-11-23

## 2018-11-23 LAB
ALBUMIN SERPL BCP-MCNC: 2.7 G/DL
ALP SERPL-CCNC: 69 U/L
ALT SERPL W/O P-5'-P-CCNC: 21 U/L
ANION GAP SERPL CALC-SCNC: 8 MMOL/L
AST SERPL-CCNC: 25 U/L
BASOPHILS # BLD AUTO: 0.02 K/UL
BASOPHILS NFR BLD: 0.5 %
BILIRUB SERPL-MCNC: 0.3 MG/DL
BUN SERPL-MCNC: 38 MG/DL
CALCIUM SERPL-MCNC: 9.1 MG/DL
CHLORIDE SERPL-SCNC: 107 MMOL/L
CO2 SERPL-SCNC: 24 MMOL/L
CREAT SERPL-MCNC: 1.5 MG/DL
DIFFERENTIAL METHOD: ABNORMAL
EOSINOPHIL # BLD AUTO: 0.1 K/UL
EOSINOPHIL NFR BLD: 1.5 %
ERYTHROCYTE [DISTWIDTH] IN BLOOD BY AUTOMATED COUNT: 14.1 %
EST. GFR  (AFRICAN AMERICAN): 53 ML/MIN/1.73 M^2
EST. GFR  (NON AFRICAN AMERICAN): 45.9 ML/MIN/1.73 M^2
GLUCOSE SERPL-MCNC: 89 MG/DL
HCT VFR BLD AUTO: 31.4 %
HGB BLD-MCNC: 10 G/DL
IMM GRANULOCYTES # BLD AUTO: 0.01 K/UL
IMM GRANULOCYTES NFR BLD AUTO: 0.3 %
LYMPHOCYTES # BLD AUTO: 2 K/UL
LYMPHOCYTES NFR BLD: 50.3 %
MAGNESIUM SERPL-MCNC: 2 MG/DL
MCH RBC QN AUTO: 28.3 PG
MCHC RBC AUTO-ENTMCNC: 31.8 G/DL
MCV RBC AUTO: 89 FL
MONOCYTES # BLD AUTO: 0.4 K/UL
MONOCYTES NFR BLD: 10.4 %
NEUTROPHILS # BLD AUTO: 1.5 K/UL
NEUTROPHILS NFR BLD: 37 %
NRBC BLD-RTO: 0 /100 WBC
PHOSPHATE SERPL-MCNC: 4.3 MG/DL
PLATELET # BLD AUTO: 212 K/UL
PMV BLD AUTO: 10.7 FL
POCT GLUCOSE: 118 MG/DL (ref 70–110)
POTASSIUM SERPL-SCNC: 5.1 MMOL/L
PROT SERPL-MCNC: 6.3 G/DL
RBC # BLD AUTO: 3.53 M/UL
SODIUM SERPL-SCNC: 139 MMOL/L
WBC # BLD AUTO: 3.96 K/UL

## 2018-11-23 PROCEDURE — 25000003 PHARM REV CODE 250: Performed by: PHYSICIAN ASSISTANT

## 2018-11-23 PROCEDURE — 25000003 PHARM REV CODE 250: Performed by: STUDENT IN AN ORGANIZED HEALTH CARE EDUCATION/TRAINING PROGRAM

## 2018-11-23 PROCEDURE — 97535 SELF CARE MNGMENT TRAINING: CPT

## 2018-11-23 PROCEDURE — G8988 SELF CARE GOAL STATUS: HCPCS | Mod: CL

## 2018-11-23 PROCEDURE — 97112 NEUROMUSCULAR REEDUCATION: CPT

## 2018-11-23 PROCEDURE — 84100 ASSAY OF PHOSPHORUS: CPT

## 2018-11-23 PROCEDURE — 63600175 PHARM REV CODE 636 W HCPCS: Performed by: NURSE PRACTITIONER

## 2018-11-23 PROCEDURE — G8998 SWALLOW D/C STATUS: HCPCS | Mod: CJ

## 2018-11-23 PROCEDURE — G8989 SELF CARE D/C STATUS: HCPCS | Mod: CL

## 2018-11-23 PROCEDURE — 25000003 PHARM REV CODE 250: Performed by: NURSE PRACTITIONER

## 2018-11-23 PROCEDURE — 99233 SBSQ HOSP IP/OBS HIGH 50: CPT | Mod: ,,, | Performed by: PSYCHIATRY & NEUROLOGY

## 2018-11-23 PROCEDURE — 83735 ASSAY OF MAGNESIUM: CPT

## 2018-11-23 PROCEDURE — 85025 COMPLETE CBC W/AUTO DIFF WBC: CPT

## 2018-11-23 PROCEDURE — 36415 COLL VENOUS BLD VENIPUNCTURE: CPT

## 2018-11-23 PROCEDURE — 80053 COMPREHEN METABOLIC PANEL: CPT

## 2018-11-23 PROCEDURE — S4991 NICOTINE PATCH NONLEGEND: HCPCS | Performed by: NURSE PRACTITIONER

## 2018-11-23 RX ORDER — ASPIRIN 325 MG
325 TABLET ORAL DAILY
Refills: 0
Start: 2018-11-23 | End: 2021-01-12

## 2018-11-23 RX ORDER — IBUPROFEN 200 MG
1 TABLET ORAL DAILY
Refills: 0
Start: 2018-11-23 | End: 2018-12-28

## 2018-11-23 RX ORDER — CARVEDILOL 25 MG/1
25 TABLET ORAL 2 TIMES DAILY
Qty: 60 TABLET | Refills: 11
Start: 2018-11-23 | End: 2021-01-12 | Stop reason: SDUPTHER

## 2018-11-23 RX ORDER — IBUPROFEN 200 MG
1 TABLET ORAL DAILY
Refills: 0
Start: 2018-12-29 | End: 2019-01-12

## 2018-11-23 RX ORDER — HYDRALAZINE HYDROCHLORIDE 25 MG/1
75 TABLET, FILM COATED ORAL EVERY 8 HOURS
Qty: 270 TABLET | Refills: 11
Start: 2018-11-23 | End: 2021-01-12

## 2018-11-23 RX ORDER — NICOTINE 7MG/24HR
1 PATCH, TRANSDERMAL 24 HOURS TRANSDERMAL DAILY
Refills: 0
Start: 2019-01-13 | End: 2019-01-27

## 2018-11-23 RX ORDER — AMLODIPINE BESYLATE 10 MG/1
10 TABLET ORAL DAILY
Qty: 30 TABLET | Refills: 11
Start: 2018-11-23 | End: 2019-11-23

## 2018-11-23 RX ORDER — AMOXICILLIN 250 MG
1 CAPSULE ORAL DAILY
Start: 2018-11-23 | End: 2021-01-12

## 2018-11-23 RX ORDER — ATORVASTATIN CALCIUM 40 MG/1
40 TABLET, FILM COATED ORAL DAILY
Qty: 90 TABLET | Refills: 3
Start: 2018-11-23 | End: 2021-01-08 | Stop reason: SDUPTHER

## 2018-11-23 RX ADMIN — CARVEDILOL 25 MG: 25 TABLET, FILM COATED ORAL at 09:11

## 2018-11-23 RX ADMIN — HYDRALAZINE HYDROCHLORIDE 75 MG: 50 TABLET ORAL at 05:11

## 2018-11-23 RX ADMIN — NICOTINE 1 PATCH: 21 PATCH, EXTENDED RELEASE TRANSDERMAL at 09:11

## 2018-11-23 RX ADMIN — HYDRALAZINE HYDROCHLORIDE 75 MG: 50 TABLET ORAL at 02:11

## 2018-11-23 RX ADMIN — HEPARIN SODIUM 5000 UNITS: 5000 INJECTION, SOLUTION INTRAVENOUS; SUBCUTANEOUS at 02:11

## 2018-11-23 RX ADMIN — STANDARDIZED SENNA CONCENTRATE AND DOCUSATE SODIUM 1 TABLET: 8.6; 5 TABLET, FILM COATED ORAL at 09:11

## 2018-11-23 RX ADMIN — ASPIRIN 325 MG ORAL TABLET 325 MG: 325 PILL ORAL at 09:11

## 2018-11-23 RX ADMIN — HEPARIN SODIUM 5000 UNITS: 5000 INJECTION, SOLUTION INTRAVENOUS; SUBCUTANEOUS at 05:11

## 2018-11-23 RX ADMIN — AMLODIPINE BESYLATE 10 MG: 10 TABLET ORAL at 09:11

## 2018-11-23 RX ADMIN — ATORVASTATIN CALCIUM 40 MG: 20 TABLET, FILM COATED ORAL at 09:11

## 2018-11-23 NOTE — ASSESSMENT & PLAN NOTE
Nikolas Callaway is a 73 y.o. 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 He was transferred to [facility] [place] [place] further care and admitted to neuro ICU. Etiology possibly hypertensive ICH.     MRI Brain 11/22 showed acute R pontine stroke. MRA WNL. Started ASA, statin. This broadens the stroke differential to include ischemic stroke with hemorrhagic conversion, likely 2/2 small vessel disease. Echo also with mild LAE however though so cannot rule out possible embolic event as well. Ordered 30-day cardiac event monitor for after rehab d/c.    Pt stable for d/c to rehab today.      Antithrombotics for secondary stroke prevention: Antiplatelets: Aspirin: 325 mg daily  Statins for secondary stroke prevention and hyperlipidemia, if present:   Statins: Atorvastatin- 40 mg daily  Aggressive risk factor modification: HTN, Smoking, HLD, Diet, Exercise  Rehab efforts: PT/OT/SLP to evaluate and treat, PM&R consult  recommending inpatient rehab  Diagnostics ordered/pending: None   VTE prophylaxis: Heparin 5000 units SQ every 8 hours, SCDs  BP parameters: ICH: SBP <160

## 2018-11-23 NOTE — PLAN OF CARE
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       Allergies:Review of patient's allergies indicates:  No Known Allergies    Diet: Mechanical soft diet and fluid consistency thin    Activities: Activity as tolerated    Nursing: Per facility protocol     Labs: Per facility protocol     CONSULTS:    Physical Therapy to evaluate and treat. , Occupational Therapy to evaluate and treat., Speech Therapy to evaluate and treat for Language, Swallowing and Cognition. and  to evaluate for community resources/long-range planning.    MISCELLANEOUS CARE:  None    WOUND CARE ORDERS  None    Medications: Review discharge medications with patient and family and provide education.      Current Discharge Medication List      START taking these medications    Details   amLODIPine (NORVASC) 10 MG tablet Take 1 tablet (10 mg total) by mouth once daily.  Qty: 30 tablet, Refills: 11      aspirin 325 MG tablet Take 1 tablet (325 mg total) by  mouth once daily.  Refills: 0      atorvastatin (LIPITOR) 40 MG tablet Take 1 tablet (40 mg total) by mouth once daily.  Qty: 90 tablet, Refills: 3      carvedilol (COREG) 25 MG tablet Take 1 tablet (25 mg total) by mouth 2 (two) times daily.  Qty: 60 tablet, Refills: 11      hydrALAZINE (APRESOLINE) 25 MG tablet Take 3 tablets (75 mg total) by mouth every 8 (eight) hours.  Qty: 270 tablet, Refills: 11      nicotine (NICODERM CQ) 14 mg/24 hr Place 1 patch onto the skin once daily. for 14 days  Refills: 0    START DATE: 12/29/18   STOP DATE: 1/12/19      nicotine (NICODERM CQ) 21 mg/24 hr Place 1 patch onto the skin once daily.  Refills: 0    STOP DATE: 12/28/18      nicotine (NICODERM CQ) 7 mg/24 hr Place 1 patch onto the skin once daily. for 14 days  Refills: 0   START DATE: 1/13/19   STOP DATE: 1/27/19      senna-docusate 8.6-50 mg (PERICOLACE) 8.6-50 mg per tablet Take 1 tablet by mouth once daily.         STOP taking these medications       lisinopril 10 MG tablet Comments:   Reason for Stopping:                Follow-up With  Details  Why  Contact Info     Princess MERI Boothe MD  In 1 week  Call your PCP to set up hospital follow-up appt within 1 week of rehab discharge  [address]  [facility] [address]  [phone]     [facility] VASCULAR NEUROLOGY  In 6 weeks  Someone from our office will call you to set up hospital follow-up within 4-6 weeks. If nobody calls within 1 week, call number above to schedule an appt.    [address]  [facility] [address]  [phone]   [facility] NEUROSURGERY  In 2 weeks  Someone will call you to set up hospital follow-up in 2 weeks and repeat CT scan  [address]  [facility] [address]  [phone]                 _________________________________  Deanne Flor PA-C  11/23/2018

## 2018-11-23 NOTE — PLAN OF CARE
Problem: Occupational Therapy Goal  Goal: Occupational Therapy Goal  Goals set 11/19 to be addressed for 7 days with expiration date, 11/26:  Patient will increase functional independence with ADLs by performing:    Patient will demonstrate rolling to the right with modified independence.  Not met   Patient will demonstrate rolling to the left with CGA.   Not met  Patient will demonstrate supine -sit with min assist.   Not met  Patient will demonstrate stand pivot transfers with min assist.   Not met  Patient will demonstrate grooming while standing with mod assist.   Not met  Patient will demonstrate upper body dressing with mod assist while seated EOB.   Not met  Patient will demonstrate lower body dressing with mod assist while seated EOB.   Not met  Patient will demonstrate toileting with mod assist.   Not met  Patient will demonstrate bathing while seated EOB with mod assist.   Not met  Patient's family / caregiver will demonstrate independence and safety with assisting patient with self-care skills and functional mobility.     Not met  Patient's family / caregiver will demonstrate independence with providing ROM and changes in bed positioning.   Not met  Patient and/or patient's family will verbalize understanding of stroke prevention guidelines, personal risk factors and stroke warning signs via teachback method.  Not met           Goals remain appropriate.  JAIRO Tellez  11/23/2018

## 2018-11-23 NOTE — PLAN OF CARE
[facility]. Patient and family in agreement with discharge plan.Followup appts have been scheduled and AVS has been updated. Korina with  Rehab states they are waiting for a patient to be discharged at 12pm and then room to be cleaned. She asked if nurse can call report after 12:30pm and for transportation to be arranged after 1pm. Nurse can call report to [phone], patient going to room 4126.  van transportation has been arranged with [facility] for 2pm.  Updated nurse with info.     11/23/18 1114   Final Note   Assessment Type Final Discharge Note   Anticipated Discharge Disposition Rehab  ([facility])   Right Care Referral Info   Post Acute Recommendation IRF

## 2018-11-23 NOTE — SUBJECTIVE & OBJECTIVE
Neurologic Chief Complaint: L thalamic ICH    Subjective:     Interval History: Patient is seen for follow-up neurological assessment and treatment recommendations: NAEON. Cr mildly elevated but stable; encouraged PO hydration. Ordered 30-day event monitor. Pt d/c to rehab today.    HPI, Past Medical, Family, and Social History remains the same as documented in the initial encounter.     Review of Systems   Constitutional: Negative for chills and fever.   Gastrointestinal: Negative for nausea and vomiting.   Neurological: Positive for facial asymmetry, speech difficulty and weakness.   Psychiatric/Behavioral: Negative for agitation and behavioral problems.     Scheduled Meds:   amLODIPine  10 mg Oral Daily    aspirin  325 mg Oral Daily    atorvastatin  40 mg Oral Daily    carvedilol  25 mg Oral BID    heparin (porcine)  5,000 Units Subcutaneous Q8H    hydrALAZINE  75 mg Oral Q8H    nicotine  1 patch Transdermal Daily    senna-docusate 8.6-50 mg  1 tablet Oral Daily    sodium chloride 0.9%  3 mL Intravenous Q8H     Continuous Infusions:  PRN Meds:dextrose 50%, glucagon (human recombinant), hydrALAZINE, insulin aspart U-100    Objective:     Vital Signs (Most Recent):  Temp: 97.6 °F (36.4 °C) (11/23/18 1147)  Pulse: (!) 54 (11/23/18 1147)  Resp: 17 (11/23/18 1147)  BP: 129/69 (11/23/18 1147)  SpO2: 96 % (11/23/18 1147)  BP Location: Left arm    Vital Signs Range (Last 24H):  Temp:  [96 °F (35.6 °C)-98 °F (36.7 °C)]   Pulse:  [47-64]   Resp:  [17-18]   BP: (110-140)/(56-75)   SpO2:  [95 %-98 %]   BP Location: Left arm    Physical Exam   Constitutional: He is oriented to person, place, and time. He appears well-developed and well-nourished. No distress.   HENT:   Head: Normocephalic and atraumatic.   Eyes: EOM are normal. No scleral icterus.   Cardiovascular: Bradycardia present.   Pulmonary/Chest: Effort normal. No respiratory distress.   Musculoskeletal: He exhibits no edema or deformity.   Neurological: He  is alert and oriented to person, place, and time. A cranial nerve deficit is present. No sensory deficit.   Skin: Skin is warm and dry.   Psychiatric: He has a normal mood and affect. His behavior is normal.   Vitals reviewed.      Neurological Exam:   LOC: alert  Attention Span: Good   Language: No aphasia  Articulation: Dysarthria  Orientation: Person, Place, Time   Visual Fields: Full  EOM (CN III, IV, VI): Full/intact  Facial Movement (CN VII): Lower facial weakness on the Right  Motor: Arm left  Normal 5/5  Leg left  Normal 5/5  Arm right  Paresis: 3/5  Leg right Paresis: 4/5  Sensation: Intact to light touch, temperature   Tone: Normal tone throughout        Laboratory:  CMP:   Recent Labs   Lab 11/23/18  0459   CALCIUM 9.1   ALBUMIN 2.7*   PROT 6.3      K 5.1   CO2 24      BUN 38*   CREATININE 1.5*   ALKPHOS 69   ALT 21   AST 25   BILITOT 0.3     CBC:   Recent Labs   Lab 11/23/18  0459   WBC 3.96   RBC 3.53*   HGB 10.0*   HCT 31.4*      MCV 89   MCH 28.3   MCHC 31.8*     Lipid Panel:   No results for input(s): CHOL, LDLCALC, HDL, TRIG in the last 168 hours.  Coagulation:   No results for input(s): PT, INR, APTT in the last 168 hours.  Platelet Aggregation Study: No results for input(s): PLTAGG, PLTAGINTERP, PLTAGREGLACO, ADPPLTAGGREG in the last 168 hours.  Hgb A1C:   No results for input(s): HGBA1C in the last 168 hours.  TSH:   No results for input(s): TSH in the last 168 hours.    Diagnostic Results     Brain/Vessel imaging:    MRI Brain, MRA Brain/Neck 11/22/18    No hemodynamically significant stenosis, aneurysmal dilatation, or dissection identified within the intra or extracranial circulation.  Grossly stable left basal ganglia hemorrhage without significant increase in surrounding edema.  No new hemorrhage.  Acute infarction of the right belinda, suspected to have been present on 11/14/2018.  Remote infarctions of the left basal ganglia and right cerebellum.  Findings compatible with  moderate to severe degree of chronic microvascular ischemic change.    CT Head. Date: 11/14/18 1424  1. Grossly stable focus of intracranial hemorrhage centered in the region of the left basal ganglia.  There is stable to subtly increased surrounding edema.  This lies adjacent to a previous infarct.  Correlation advised, continued follow-up recommended.  2. Stable sequela of chronic microvascular ischemic change and senescent change.  3. Nonspecific punctate focus of low attenuation within the right cerebellum, age-indeterminate infarct is a consideration.     CT Head. Date: 11/14/18 1050  1. Acute left basal ganglia hemorrhage without midline shift or hydrocephalus.  Given the location of the hematoma likely etiologies is hypertensive hemorrhage.  2. Extensive periventricular white matter changes in the centrum from chronic microvascular ischemia.  This report was flagged in Epic as abnormal.       Cardiac Evaluation:   Echo. Date: 11/15/18  · Normal left ventricular systolic function. The estimated ejection fraction is 65%  · Concentric left ventricular remodeling.  · Normal LV diastolic function.  · No wall motion abnormalities.  · Normal right ventricular systolic function.  · Mild left atrial enlargement.  · Normal central venous pressure (3 mm Hg).  · No pericardial effusion.

## 2018-11-23 NOTE — ASSESSMENT & PLAN NOTE
Area of vasogenic and cytotoxic cerebral edema identified when reviewing brain imaging in the L thalamus and in the territory of the R perforating vessels of the basilar artery, respectfully. There is not mass effect associated with it. We will continue to monitor the patients clinical exam for any worsening of symptoms which may indicate expansion of the hemorrhage or the area of the edema resulting in the clinical change. The ICH is likely 2/2 uncontrolled hypertension, though cannot rule out hemorrhagic conversion of an ischemic stroke likely due to small vessel disease.

## 2018-11-23 NOTE — PLAN OF CARE
Patient accepted to [facility] when medically ready for d/c.     11/23/18 0825   Post-Acute Status   Post-Acute Authorization Placement   Post-Acute Placement Status Authorization Obtained

## 2018-11-23 NOTE — PROGRESS NOTES
Saline lock and telemetry monitor were dc'd.Report was called to Carina at [facility].Pt awake,alert,verbally responsive,no distress noted,breathing unlabored.Denies any pain or discomfort at this time. Will continue to monitor. Pt waiting on [facility]'s transportation to be dc'd.

## 2018-11-23 NOTE — PT/OT/SLP DISCHARGE
Occupational Therapy Discharge Summary    Nikolas Callaway  MRN: [id number]   Principal Problem: Nontraumatic subcortical hemorrhage of left cerebral hemisphere      Patient Discharged from acute Occupational Therapy on 11/23.  Please refer to prior OT note dated 11/23 for functional status.    Assessment:      Patient appropriate for care in another setting.    Objective:     GOALS:   Multidisciplinary Problems     Occupational Therapy Goals     Not on file          Multidisciplinary Problems (Resolved)        Problem: Occupational Therapy Goal    Goal Priority Disciplines Outcome Interventions   Occupational Therapy Goal   (Resolved)     OT, PT/OT Outcome(s) achieved    Description:  Goals set 11/19 to be addressed for 7 days with expiration date, 11/26:  Patient will increase functional independence with ADLs by performing:    Patient will demonstrate rolling to the right with modified independence.  Not met   Patient will demonstrate rolling to the left with CGA.   Not met  Patient will demonstrate supine -sit with min assist.   Not met  Patient will demonstrate stand pivot transfers with min assist.   Not met  Patient will demonstrate grooming while standing with mod assist.   Not met  Patient will demonstrate upper body dressing with mod assist while seated EOB.   Not met  Patient will demonstrate lower body dressing with mod assist while seated EOB.   Not met  Patient will demonstrate toileting with mod assist.   Not met  Patient will demonstrate bathing while seated EOB with mod assist.   Not met  Patient's family / caregiver will demonstrate independence and safety with assisting patient with self-care skills and functional mobility.     Not met  Patient's family / caregiver will demonstrate independence with providing ROM and changes in bed positioning.   Not met  Patient and/or patient's family will verbalize understanding of stroke prevention guidelines, personal risk factors and stroke warning signs via  teachback method.  Not met                            Reasons for Discontinuation of Therapy Services  Transfer to alternate level of care.      Plan:     Patient Discharged to: Inpatient Rehab    JAIRO Tellez  11/23/2018

## 2018-11-23 NOTE — PROGRESS NOTES
[facility]  Vascular Neurology  Comprehensive Stroke Center  Progress Note    Assessment/Plan:     * Nontraumatic subcortical hemorrhage of left cerebral hemisphere    Nikolas Callaway is a 73 y.o. 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 He was transferred to [facility] for further care and admitted to neuro ICU. Etiology possibly hypertensive ICH.     MRI Brain 11/22 showed acute R pontine stroke. MRA WNL. Started ASA, statin. This broadens the stroke differential to include ischemic stroke with hemorrhagic conversion, likely 2/2 small vessel disease. Echo also with mild LAE however though so cannot rule out possible embolic event as well. Ordered 30-day cardiac event monitor for after rehab d/c.    Pt stable for d/c to rehab today.      Antithrombotics for secondary stroke prevention: Antiplatelets: Aspirin: 325 mg daily  Statins for secondary stroke prevention and hyperlipidemia, if present:   Statins: Atorvastatin- 40 mg daily  Aggressive risk factor modification: HTN, Smoking, HLD, Diet, Exercise  Rehab efforts: PT/OT/SLP to evaluate and treat, PM&R consult  recommending inpatient rehab  Diagnostics ordered/pending: None   VTE prophylaxis: Heparin 5000 units SQ every 8 hours, SCDs  BP parameters: ICH: SBP <160     Thrombotic stroke involving basilar artery    Seen on MRI Brain 11/22   Started ASA (monotherapy due to hemorrhage) and statin  See primary problem above     Vasogenic cerebral edema    Area of vasogenic and cytotoxic cerebral edema identified when reviewing brain imaging in the L thalamus and in the territory of the R perforating vessels of the basilar artery, respectfully. There is not mass effect associated with it. We will continue to monitor the patients clinical exam for any worsening of symptoms which may indicate expansion of the hemorrhage or the area of the edema resulting in the clinical change. The ICH is likely 2/2 uncontrolled  hypertension, though cannot rule out hemorrhagic conversion of an ischemic stroke likely due to small vessel disease.     Hypertension    Stroke risk factor   SBP 220s on arrival, required cardene  SBP <160, currently at goal  Continued current regimen at d/c (amlodipine 10, carvedilol 25, hydralazine 75)  PCP follow-up     HLD (hyperlipidemia)    Stroke risk factor  LDL 98  Atorvastatin 40     Current smoker    Stroke risk factor  Nicotine patch  Encourage smoking cessation for 2/2 stroke prevention     Stage 3 chronic kidney disease    Stroke risk factor  Given NS bolus 11/21 with improvement of Cr  Discussed with Hospital Medicine - Cr mildly elevated but mostly stable, no need for further boluses  Encouraged PO fluid intake at d/c     Right sided weakness    Result of stroke  PT/OT and SLP to evaluate and treat  Plans for inpatient rehab     Dysarthria    2/2 ICH  SLP to evaluate and treat          11/19 encourage fluid intake due to Cr, waiting rehab placement  11/20 Cr stable, waiting placement  11/21: Cr mildly elevated; ordered NS bolus. Pending insurance auth for rehab.  11/22: MRI/MRA completed; started ASA, statin. Cr stabilized. Likely d/c to rehab tomorrow.  11/23: Cr mildly elevated but stable; encouraged PO hydration. Ordered 30-day event monitor. Pt d/c to rehab today.    STROKE DOCUMENTATION   Acute Stroke Times   Last Known Normal Date: 11/14/18  Last Known Normal Time: 1030  Symptom Onset Date: 11/14/18  Symptom Onset Time: 1030  Stroke Team Called Date: 11/14/18  Stroke Team Called Time: 1055  Stroke Team Arrival Date: 11/14/18  Stroke Team Arrival Time: 1058  CT Interpretation Time: 1058  Decision to Treat Time for Alteplase: (No iv alteplase candidate)  Decision to Treat Time for IR: (No IR candidate)    NIH Scale:  1a. Level Of Consciousness: 0-->Alert: keenly responsive  1b. LOC Questions: 0-->Answers both questions correctly  1c. LOC Commands: 0-->Performs both tasks correctly  2. Best Gaze:  0-->Normal  3. Visual: 0-->No visual loss  4. Facial Palsy: 1-->Minor paralysis (flattened nasolabial fold, asymmetry on smiling)  5a. Motor Arm, Left: 0-->No drift: limb holds 90 (or 45) degrees for full 10 secs  5b. Motor Arm, Right: 1-->Drift: limb holds 90 (or 45) degrees, but drifts down before full 10 secs: does not hit bed or other support  6a. Motor Leg, Left: 0-->No drift: leg holds 30 degree position for full 5 secs  6b. Motor Leg, Right: 1-->Drift: leg falls by the end of the 5-sec period but does not hit bed  7. Limb Ataxia: 0-->Absent  8. Sensory: 0-->Normal: no sensory loss  9. Best Language: 0-->No aphasia: normal  10. Dysarthria: 1-->Mild-to-moderate dysarthria: patient slurs at least some words and, at worst, can be understood with some difficulty  11. Extinction and Inattention (formerly Neglect): 0-->No abnormality  Total (NIH Stroke Scale): 4       Modified [place] Score: 0  Richard Coma Scale:    ABCD2 Score:    [id number] Score:   HAS -BLED Score:   ICH Score:0  Hunt & Parnell Classification:      Hemorrhagic change of an Ischemic Stroke: Does this patient have an ischemic stroke with hemorrhagic changes? No  -- Unclear at this time    Neurologic Chief Complaint: L thalamic ICH    Subjective:     Interval History: Patient is seen for follow-up neurological assessment and treatment recommendations: NAEON. Cr mildly elevated but stable; encouraged PO hydration. Ordered 30-day event monitor. Pt d/c to rehab today.    HPI, Past Medical, Family, and Social History remains the same as documented in the initial encounter.     Review of Systems   Constitutional: Negative for chills and fever.   Gastrointestinal: Negative for nausea and vomiting.   Neurological: Positive for facial asymmetry, speech difficulty and weakness.   Psychiatric/Behavioral: Negative for agitation and behavioral problems.     Scheduled Meds:   amLODIPine  10 mg Oral Daily    aspirin  325 mg Oral Daily    atorvastatin  40 mg  Oral Daily    carvedilol  25 mg Oral BID    heparin (porcine)  5,000 Units Subcutaneous Q8H    hydrALAZINE  75 mg Oral Q8H    nicotine  1 patch Transdermal Daily    senna-docusate 8.6-50 mg  1 tablet Oral Daily    sodium chloride 0.9%  3 mL Intravenous Q8H     Continuous Infusions:  PRN Meds:dextrose 50%, glucagon (human recombinant), hydrALAZINE, insulin aspart U-100    Objective:     Vital Signs (Most Recent):  Temp: 97.6 °F (36.4 °C) (11/23/18 1147)  Pulse: (!) 54 (11/23/18 1147)  Resp: 17 (11/23/18 1147)  BP: 129/69 (11/23/18 1147)  SpO2: 96 % (11/23/18 1147)  BP Location: Left arm    Vital Signs Range (Last 24H):  Temp:  [96 °F (35.6 °C)-98 °F (36.7 °C)]   Pulse:  [47-64]   Resp:  [17-18]   BP: (110-140)/(56-75)   SpO2:  [95 %-98 %]   BP Location: Left arm    Physical Exam   Constitutional: He is oriented to person, place, and time. He appears well-developed and well-nourished. No distress.   HENT:   Head: Normocephalic and atraumatic.   Eyes: EOM are normal. No scleral icterus.   Cardiovascular: Bradycardia present.   Pulmonary/Chest: Effort normal. No respiratory distress.   Musculoskeletal: He exhibits no edema or deformity.   Neurological: He is alert and oriented to person, place, and time. A cranial nerve deficit is present. No sensory deficit.   Skin: Skin is warm and dry.   Psychiatric: He has a normal mood and affect. His behavior is normal.   Vitals reviewed.      Neurological Exam:   LOC: alert  Attention Span: Good   Language: No aphasia  Articulation: Dysarthria  Orientation: Person, Place, Time   Visual Fields: Full  EOM (CN III, IV, VI): Full/intact  Facial Movement (CN VII): Lower facial weakness on the Right  Motor: Arm left  Normal 5/5  Leg left  Normal 5/5  Arm right  Paresis: 3/5  Leg right Paresis: 4/5  Sensation: Intact to light touch, temperature   Tone: Normal tone throughout        Laboratory:  CMP:   Recent Labs   Lab 11/23/18  0459   CALCIUM 9.1   ALBUMIN 2.7*   PROT 6.3   NA  139   K 5.1   CO2 24      BUN 38*   CREATININE 1.5*   ALKPHOS 69   ALT 21   AST 25   BILITOT 0.3     CBC:   Recent Labs   Lab 11/23/18  0459   WBC 3.96   RBC 3.53*   HGB 10.0*   HCT 31.4*      MCV 89   MCH 28.3   MCHC 31.8*     Lipid Panel:   No results for input(s): CHOL, LDLCALC, HDL, TRIG in the last 168 hours.  Coagulation:   No results for input(s): PT, INR, APTT in the last 168 hours.  Platelet Aggregation Study: No results for input(s): PLTAGG, PLTAGINTERP, PLTAGREGLACO, ADPPLTAGGREG in the last 168 hours.  Hgb A1C:   No results for input(s): HGBA1C in the last 168 hours.  TSH:   No results for input(s): TSH in the last 168 hours.    Diagnostic Results     Brain/Vessel imaging:    MRI Brain, MRA Brain/Neck 11/22/18    No hemodynamically significant stenosis, aneurysmal dilatation, or dissection identified within the intra or extracranial circulation.  Grossly stable left basal ganglia hemorrhage without significant increase in surrounding edema.  No new hemorrhage.  Acute infarction of the right belinda, suspected to have been present on 11/14/2018.  Remote infarctions of the left basal ganglia and right cerebellum.  Findings compatible with moderate to severe degree of chronic microvascular ischemic change.    CT Head. Date: 11/14/18 1382  1. Grossly stable focus of intracranial hemorrhage centered in the region of the left basal ganglia.  There is stable to subtly increased surrounding edema.  This lies adjacent to a previous infarct.  Correlation advised, continued follow-up recommended.  2. Stable sequela of chronic microvascular ischemic change and senescent change.  3. Nonspecific punctate focus of low attenuation within the right cerebellum, age-indeterminate infarct is a consideration.     CT Head. Date: 11/14/18 1050  1. Acute left basal ganglia hemorrhage without midline shift or hydrocephalus.  Given the location of the hematoma likely etiologies is hypertensive hemorrhage.  2. Extensive  periventricular white matter changes in the centrum from chronic microvascular ischemia.  This report was flagged in Epic as abnormal.       Cardiac Evaluation:   Echo. Date: 11/15/18  · Normal left ventricular systolic function. The estimated ejection fraction is 65%  · Concentric left ventricular remodeling.  · Normal LV diastolic function.  · No wall motion abnormalities.  · Normal right ventricular systolic function.  · Mild left atrial enlargement.  · Normal central venous pressure (3 mm Hg).  · No pericardial effusion.      Deanne Flor PA-C  Comprehensive Stroke Center  Department of Vascular Neurology   [facility]

## 2018-11-23 NOTE — ASSESSMENT & PLAN NOTE
Stroke risk factor   SBP 220s on arrival, required cardene  SBP <160, currently at goal  Continued current regimen at d/c (amlodipine 10, carvedilol 25, hydralazine 75)  PCP follow-up

## 2018-11-23 NOTE — ASSESSMENT & PLAN NOTE
Stroke risk factor  Given NS bolus 11/21 with improvement of Cr  Discussed with Hospital Medicine - Cr mildly elevated but mostly stable, no need for further boluses  Encouraged PO fluid intake at d/c

## 2018-11-23 NOTE — PLAN OF CARE
1606-As patient is discharging with [facility] transportation, CM was notified that patient has a bicycle that was kept in the ER while he was admitted. The nurse went to the ER to get the bike and stored on the unit to send with patient/[facility] transportation. [facility] is refusing to take patient's bike stating that they are unable to take anything except patient and one bag. CM was told that the patient did not have any family members that could come pick it up.  Charge nurse states that they cannot keep it on the unit due to not having anywhere to keep it. Charge nurse spoke to Carleen in reference to transporting bike to Rehab with patient. CM was unaware that patient even had the bike with him until transportation arrived. Nurse states she was not aware the patient had it either until patient mentioned it when getting him ready for discharge. Per conversation between Carleen and charge nurse, they will lock bike up for the weekend and address it on Monday.     1633-CM called patient's daughter Mayuri in his emergency contact list to see if there is any family member that can come  patient's bike. She states that her uncle has a truck and she can ask him to come pick it up later tonight or tomorrow. Notified her to tell him to go to nurses' station and speak to the  or charge nurse. She states ok. Notified charge nurse of conversation with patient's daughter.

## 2018-11-23 NOTE — PT/OT/SLP PROGRESS
"Occupational Therapy   Treatment    Name: Nikolas Callaway  MRN: [id number]  Admitting Diagnosis:  Nontraumatic subcortical hemorrhage of left cerebral hemisphere       Recommendations:     Discharge Recommendations: rehabilitation facility  Discharge Equipment Recommendations:  3-in-1 commode, bath bench  Barriers to discharge:  Inaccessible home environment, Decreased caregiver support    Subjective   Patient:  "Oh, I am not doing good standing."  Pain/Comfort:  · Pain Rating 1: 0/10  · Pain Rating Post-Intervention 1: 0/10    Objective:     Communicated with: Nurse prior to session.  Patient found with all lines intact, call button in reach and bed alarm on and peripheral IV, SCD, telemetry upon OT entry to room.  Family not present.  General Precautions: Standard, aspiration, fall   Orthopedic Precautions:N/A   Braces: N/A     Occupational Performance:    Bed Mobility:    · Patient completed Rolling/Turning to Right with supervision  · Patient completed Scooting/Bridging with minimum assistance  · Patient completed Supine to Sit with minimum assistance  · Patient completed Sit to Supine with minimum assistance     Functional Mobility/Transfers:  · Patient completed Bed <> Chair Transfer using Stand Pivot technique with moderate assistance with no assistive device    Activities of Daily Living:  · Upper Body Dressing: Max assist while seated EOB  · Lower Body Dressing: Max assist while seated EOB  · Grooming:  Moderate assist while seated EOB with right UE in weight bearing    AMPA 6 Click ADL: 12    Treatment & Education:  Patient education provided for stroke warning signs, prevention guidelines and personal risk factors.  Patient education provided on role of OT and need for rehab upon discharge.    Continued education, patient/ family training recommended.  Daily orientation provided.  AAROM performed right UE/LEs one set x 10 rep in all planes of motion with stretches provided at end range; sustained stretch " provided for ankle dorsiflexion, external rotation, supination and shoulder flexion within patient's pain tolerance.  Assistance and facilitation provided for upward rotation of the scapula during shoulder flexion and abduction.    Able to follow 4/4 one step commands.  Patient attentive and interactive throughout the session.  Oral motor ex. performed while seated.  Mod assist required with postural control while seated EOB; increased lateral trunk flexion noted on the right. Addressed right UE weight bearing while seated EOB.  Positioning provided for midline orientation with bilateral UEs elevated and heels lifted off mattress.   Family not present during the session.  Patient's functional status and disposition recommendation discussed with patient and patient's nurse.  White board updated in patient's room.  OT asked if there were any other questions; patient/ family had no further questions    Patient left supine with all lines intact, call button in reach and bed alarm on  Education:    Assessment:     Nikolas Callaway is a 73 y.o. male with a medical diagnosis of Nontraumatic subcortical hemorrhage of left cerebral hemisphere.  He presents with the following performance deficits affecting function are weakness, impaired endurance, impaired sensation, impaired self care skills, impaired functional mobilty, gait instability, impaired balance, impaired cognition, decreased coordination, decreased upper extremity function, decreased lower extremity function, decreased safety awareness, abnormal tone, decreased ROM, impaired coordination, impaired fine motor.     Rehab Prognosis:  Good; patient would benefit from acute skilled OT services to address these deficits and reach maximum level of function.       Plan:     Patient to be seen 4 x/week to address the above listed problems via self-care/home management, therapeutic activities, therapeutic exercises, neuromuscular re-education, sensory integration, cognitive  retraining  · Plan of Care Expires: 12/13/18  · Plan of Care Reviewed with: patient    This Plan of care has been discussed with the patient who was involved in its development and understands and is in agreement with the identified goals and treatment plan    GOALS:   Multidisciplinary Problems     Occupational Therapy Goals        Problem: Occupational Therapy Goal    Goal Priority Disciplines Outcome Interventions   Occupational Therapy Goal     OT, PT/OT     Description:  Goals set 11/19 to be addressed for 7 days with expiration date, 11/26:  Patient will increase functional independence with ADLs by performing:    Patient will demonstrate rolling to the right with modified independence.  Not met   Patient will demonstrate rolling to the left with CGA.   Not met  Patient will demonstrate supine -sit with min assist.   Not met  Patient will demonstrate stand pivot transfers with min assist.   Not met  Patient will demonstrate grooming while standing with mod assist.   Not met  Patient will demonstrate upper body dressing with mod assist while seated EOB.   Not met  Patient will demonstrate lower body dressing with mod assist while seated EOB.   Not met  Patient will demonstrate toileting with mod assist.   Not met  Patient will demonstrate bathing while seated EOB with mod assist.   Not met  Patient's family / caregiver will demonstrate independence and safety with assisting patient with self-care skills and functional mobility.     Not met  Patient's family / caregiver will demonstrate independence with providing ROM and changes in bed positioning.   Not met  Patient and/or patient's family will verbalize understanding of stroke prevention guidelines, personal risk factors and stroke warning signs via teachback method.  Not met                            Time Tracking:     OT Date of Treatment: 11/23/18  OT Start Time: 0635  OT Stop Time: 0659  OT Total Time (min): 24 min    Billable Minutes:Self Care/Home  Management 12  Neuromuscular Re-education 12    JAIRO Tellez  11/23/2018

## 2018-11-23 NOTE — ASSESSMENT & PLAN NOTE
Seen on MRI Brain 11/22   Started ASA (monotherapy due to hemorrhage) and statin  See primary problem above

## 2018-11-23 NOTE — PLAN OF CARE
Problem: Pressure Ulcer Risk (Kristian Scale) (Adult,Obstetrics,Pediatric)  Goal: Identify Related Risk Factors and Signs and Symptoms  Related risk factors and signs and symptoms are identified upon initiation of Human Response Clinical Practice Guideline (CPG)  Outcome: Ongoing (interventions implemented as appropriate)  Plan of care reviewed with patient. Verbalized understanding. No acute events overnight. Vitals stable. Incontinent care provided as needed. Weight shift assistance provided q 2 hrs. Remains free from falls. Bed low, call light in reach. Will continue to monitor.

## 2018-11-24 NOTE — ASSESSMENT & PLAN NOTE
Area of vasogenic and cytotoxic cerebral edema identified when reviewing brain imaging in the L thalamus and in the territory of the R perforating vessels of the basilar artery, respectfully. There is not mass effect associated with it. The patients clinical exam remained without any worsening of symptoms which may indicate expansion of the hemorrhage or the area of the edema resulting in the clinical change.   The ICH is possibly 2/2 uncontrolled hypertension, though cannot rule out hemorrhagic conversion of an ischemic stroke likely due to small vessel disease.

## 2018-11-24 NOTE — ASSESSMENT & PLAN NOTE
Stroke risk factor   SBP 220s on arrival, required cardene  SBP <160, currently at goal; Long-term goal < 140  Continued current regimen at d/c (amlodipine 10, carvedilol 25 BID, hydralazine 75 q8)  PCP follow-up

## 2018-11-24 NOTE — DISCHARGE SUMMARY
[facility]  Vascular Neurology  Comprehensive Stroke Center  Discharge Summary     Summary:     Admit Date: 11/14/2018 10:35 AM    Discharge Date and Time: 11/23/2018  3:40 PM    Attending Physician: Cassius Haines MD    Discharge Provider: Deanne Flor PA-C    History of Present Illness: Nikolas Callaway is a 73 y.o. male with PMHx of HTN and tobaccos abuse who was seen via telestroke on 11/14/19 at OSH due to RSW and dysarthria. Patient with little known medical history, has not been to a doctor in years. BP on arrival to [facility] was 221/2012. CT revealed L thalamic ICH. He was [place] [facility] [place] and admitted to neuro ICU. He remained in ICU for cardene gtt and blood pressure control. Stroke team consulted on 11/18/18, patient off cardene and ready for step down.              Hospital Course (synopsis of major diagnoses, care, treatment, and services provided during the course of the hospital stay): Mr. Nikolas Callaway was admitted to [facility] for higher level of care. Stroke etiology remains unclear at this time; suspected to be small artery occlusion 2/2 small vessel disease with an area of hemorrhagic conversion, though hypertensive process could have also contributed to the area of hemorrhage. Ordered 30-day cardiac event monitor at discharge due to mild L atrial enlargement noted on echo.    Patient discharged with recommendations for admission to [facility] inpatient rehab. Family by phone amenable to plan.     Patient with improvement in stroke symptoms since admission. Inpatient acute stroke work up completed and patient stable for discharge. Please see all appropriate medication changes, imaging results, and necessary follow-up below.    Stroke Etiology: Evident Small Artery Occlusion (SARAH)    STROKE DOCUMENTATION   Acute Stroke Times   Last Known Normal Date: 11/14/18  Last Known Normal Time: 1030  Symptom Onset Date: 11/14/18  Symptom Onset Time: 1030  Stroke  Team Called Date: 11/14/18  Stroke Team Called Time: 1055  Stroke Team Arrival Date: 11/14/18  Stroke Team Arrival Time: 1058  CT Interpretation Time: 1058  Decision to Treat Time for Alteplase: (No iv alteplase candidate)  Decision to Treat Time for IR: (No IR candidate)     NIH Scale:  Interval: 7 days or at discharge (whichever comes first)  1a. Level Of Consciousness: 0-->Alert: keenly responsive  1b. LOC Questions: 0-->Answers both questions correctly  1c. LOC Commands: 0-->Performs both tasks correctly  2. Best Gaze: 0-->Normal  3. Visual: 0-->No visual loss  4. Facial Palsy: 1-->Minor paralysis (flattened nasolabial fold, asymmetry on smiling)  5a. Motor Arm, Left: 0-->No drift: limb holds 90 (or 45) degrees for full 10 secs  5b. Motor Arm, Right: 1-->Drift: limb holds 90 (or 45) degrees, but drifts down before full 10 secs: does not hit bed or other support  6a. Motor Leg, Left: 0-->No drift: leg holds 30 degree position for full 5 secs  6b. Motor Leg, Right: 1-->Drift: leg falls by the end of the 5-sec period but does not hit bed  7. Limb Ataxia: 0-->Absent  8. Sensory: 0-->Normal: no sensory loss  9. Best Language: 0-->No aphasia: normal  10. Dysarthria: 1-->Mild-to-moderate dysarthria: patient slurs at least some words and, at worst, can be understood with some difficulty  11. Extinction and Inattention (formerly Neglect): 0-->No abnormality  Total (NIH Stroke Scale): 4        Modified McLean Score: 4  Stanwood Coma Scale:    ABCD2 Score:    [id number] Score:   HAS -BLED Score:   ICH Score:0  Hunt & Parnell Classification:       Assessment/Plan:     Diagnostic Results:    Brain/Vessel imaging:     MRI Brain, MRA Brain/Neck 11/22/18    No hemodynamically significant stenosis, aneurysmal dilatation, or dissection identified within the intra or extracranial circulation.  Grossly stable left basal ganglia hemorrhage without significant increase in surrounding edema.  No new hemorrhage.  Acute infarction of the  right belinda, suspected to have been present on 11/14/2018.  Remote infarctions of the left basal ganglia and right cerebellum.  Findings compatible with moderate to severe degree of chronic microvascular ischemic change.     CT Head. Date: 11/14/18 1424  1. Grossly stable focus of intracranial hemorrhage centered in the region of the left basal ganglia.  There is stable to subtly increased surrounding edema.  This lies adjacent to a previous infarct.  Correlation advised, continued follow-up recommended.  2. Stable sequela of chronic microvascular ischemic change and senescent change.  3. Nonspecific punctate focus of low attenuation within the right cerebellum, age-indeterminate infarct is a consideration.     CT Head. Date: 11/14/18 1050  1. Acute left basal ganglia hemorrhage without midline shift or hydrocephalus.  Given the location of the hematoma likely etiologies is hypertensive hemorrhage.  2. Extensive periventricular white matter changes in the centrum from chronic microvascular ischemia.  This report was flagged in Epic as abnormal.        Cardiac Evaluation:   Echo. Date: 11/15/18  · Normal left ventricular systolic function. The estimated ejection fraction is 65%  · Concentric left ventricular remodeling.  · Normal LV diastolic function.  · No wall motion abnormalities.  · Normal right ventricular systolic function.  · Mild left atrial enlargement.  · Normal central venous pressure (3 mm Hg).  · No pericardial effusion.      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 male who presented to OSH with RSW and dysarthria found to have L thalamic ICH. He was transferred to [facility] [place] and admitted to neuro ICU. Etiology possibly hypertensive ICH. Will follow-up with NSGY in 2 weeks for repeat CTH.    MRI Brain  showed acute R pontine stroke. MRA WNL. Started ASA, statin.   This broadens the stroke differential to include ischemic stroke with hemorrhagic conversion, likely 2/2 small vessel disease. Echo also with mild LAE however though so cannot rule out possible embolic event. Ordered 30-day cardiac event monitor for after rehab d/c.    Pt d/c to rehab 18.      Antithrombotics for secondary stroke prevention: Antiplatelets: Aspirin: 325 mg daily  Statins for secondary stroke prevention and hyperlipidemia, if present:   Statins: Atorvastatin- 40 mg daily  Aggressive risk factor modification: HTN, Smoking, HLD, Diet, Exercise  Rehab efforts: [facility] inpatient rehab  Diagnostics ordered/pendin-day cardiac event monitor  BP parameters: ICH: SBP <140 long-term     Thrombotic stroke involving basilar artery    Seen on MRI Brain    Started ASA (monotherapy due to hemorrhage) and statin  See primary problem above     Vasogenic cerebral edema    Area of vasogenic and cytotoxic cerebral edema identified when reviewing brain imaging in the L thalamus and in the territory of the R perforating vessels of the basilar artery, respectfully. There is not mass effect associated with it. The patients clinical exam remained without any worsening of symptoms which may indicate expansion of the hemorrhage or the area of the edema resulting in the  clinical change.   The ICH is possibly 2/2 uncontrolled hypertension, though cannot rule out hemorrhagic conversion of an ischemic stroke likely due to small vessel disease.     Hypertension    Stroke risk factor   SBP 220s on arrival, required cardene  SBP <160, currently at goal; Long-term goal < 140  Continued current regimen at d/c (amlodipine 10, carvedilol 25 BID, hydralazine 75 q8)  PCP follow-up     HLD (hyperlipidemia)    Stroke risk factor  LDL 98  Atorvastatin 40     Current smoker    Stroke risk factor  Nicotine patch  Encourage smoking cessation for 2/2 stroke prevention     Stage 3 chronic kidney disease    Stroke risk factor  Given NS bolus 11/21 with improvement of Cr  Discussed with Hospital Medicine - Cr mildly elevated on day of discharge but mostly stable, no need for further boluses  Held home ACEi due to fluctuating Cr levels  Encouraged PO fluid intake at d/c         Recommendations:     Post-discharge complication risks: Falls, Seizure    Stroke Education given to: patient    Follow-up in Stroke Clinic in 4-6 weeks.     Discharge Plan:  Antithrombotics: Aspirin 325mg  Statin: Atorvastatin 40mg  Smoking Cessation  Aggresive risk factor modification:  Hypertension  Smoking  High Cholesterol  Diet  Exercise    Follow Up:  Follow-up Information     Princess JAE Boothe MD In 1 week.    Specialties:  Internal Medicine, Pediatrics  Why:  Call your PCP to set up hospital follow-up appt within 1 week of rehab discharge  Contact information:  [address]  [address]  [facility] [address]  [phone]             [facility] VASCULAR NEUROLOGY In 6 weeks.    Specialty:  Vascular Neurology  Why:  Someone from our office will call you to set up hospital follow-up within 4-6 weeks. If nobody calls within 1 week, call number above to schedule an appt.  Contact information:  Supriya Fuller  [facility] [address]  [phone]           [facility] NEUROSURGERY In 2 weeks.    Specialty:   Neurosurgery  Why:  Someone will call you to set up hospital follow-up in 2 weeks and repeat CT scan  Contact information:  Supriya Fuller  [facility] [id number] [phone]                 Patient Instructions:      Cardiac event monitor   Standing Status: Future Standing Exp. Date: 11/23/19     Order Specific Question Answer Comments   Cardiac Event Monitor Auto Trigger        Medications:  Reconciled Home Medications:      Medication List      START taking these medications    amLODIPine 10 MG tablet  Commonly known as:  NORVASC  Take 1 tablet (10 mg total) by mouth once daily.     aspirin 325 MG tablet  Take 1 tablet (325 mg total) by mouth once daily.     atorvastatin 40 MG tablet  Commonly known as:  LIPITOR  Take 1 tablet (40 mg total) by mouth once daily.     carvedilol 25 MG tablet  Commonly known as:  COREG  Take 1 tablet (25 mg total) by mouth 2 (two) times daily.     hydrALAZINE 25 MG tablet  Commonly known as:  APRESOLINE  Take 3 tablets (75 mg total) by mouth every 8 (eight) hours.     * nicotine 21 mg/24 hr  Commonly known as:  NICODERM CQ  Place 1 patch onto the skin once daily.     * nicotine 14 mg/24 hr  Commonly known as:  NICODERM CQ  Place 1 patch onto the skin once daily. for 14 days  Start taking on:  12/29/2018     * nicotine 7 mg/24 hr  Commonly known as:  NICODERM CQ  Place 1 patch onto the skin once daily. for 14 days  Start taking on:  1/13/2019     senna-docusate 8.6-50 mg 8.6-50 mg per tablet  Commonly known as:  PERICOLACE  Take 1 tablet by mouth once daily.         * This list has 3 medication(s) that are the same as other medications prescribed for you. Read the directions carefully, and ask your doctor or other care provider to review them with you.            STOP taking these medications    lisinopril 10 MG tablet            Deanne Flor PA-C  Comprehensive Stroke Center  Department of Vascular Neurology   [facility]

## 2018-11-24 NOTE — ASSESSMENT & PLAN NOTE
Nikolas Callaway is a 73 y.o. male who presented to OSH with RSW and dysarthria found to have L thalamic ICH. [place] [facility] [place] and admitted to neuro ICU. Etiology possibly hypertensive ICH. Will follow-up with NSGY in 2 weeks for repeat CTH.    MRI Brain  showed acute R pontine stroke. MRA WNL. Started ASA, statin.   This broadens the stroke differential to include ischemic stroke with hemorrhagic conversion, likely 2/2 small vessel disease. Echo also with mild LAE however though so cannot rule out possible embolic event. Ordered 30-day cardiac event monitor for after rehab d/c.    Pt d/c to rehab 18.      Antithrombotics for secondary stroke prevention: Antiplatelets: Aspirin: 325 mg daily  Statins for secondary stroke prevention and hyperlipidemia, if present:   Statins: Atorvastatin- 40 mg daily  Aggressive risk factor modification: HTN, Smoking, HLD, Diet, Exercise  Rehab efforts: [facility]  Diagnostics ordered/pendin-day cardiac event monitor  BP parameters: ICH: SBP <140 long-term

## 2018-11-24 NOTE — ASSESSMENT & PLAN NOTE
Stroke risk factor  Given NS bolus 11/21 with improvement of Cr  Discussed with Hospital Medicine - Cr mildly elevated on day of discharge but mostly stable, no need for further boluses  Held home ACEi due to fluctuating Cr levels  Encouraged PO fluid intake at d/c

## 2018-11-26 NOTE — PLAN OF CARE
11/26/18-patient's bicycle has not been picked up and is currently locked up in a storage room on the 7th floor. CM spoke to patient's wife Joe in reference to picking up bike. She states she will have to contact the patient's niece to see if she can come to pick it up.  CM also left message for patient's daughter Mayuri. Updated charge nurse and ENEDINA Durant supervisor.

## 2018-12-10 ENCOUNTER — HOSPITAL ENCOUNTER (OUTPATIENT)
Dept: RADIOLOGY | Facility: HOSPITAL | Age: 73
Discharge: HOME | End: 2018-12-10
Attending: PHYSICIAN ASSISTANT
Payer: COMMERCIAL

## 2018-12-11 ENCOUNTER — HOME CARE VISIT (OUTPATIENT)
Dept: NEUROLOGY | Facility: HOSPITAL | Age: 73
End: 2018-12-11

## 2018-12-11 VITALS — DIASTOLIC BLOOD PRESSURE: 60 MMHG | OXYGEN SATURATION: 99 % | SYSTOLIC BLOOD PRESSURE: 110 MMHG | HEART RATE: 54 BPM

## 2018-12-24 ENCOUNTER — CLINICAL SUPPORT (OUTPATIENT)
Dept: CARDIOLOGY | Facility: HOSPITAL | Age: 73
End: 2018-12-24
Attending: PHYSICIAN ASSISTANT
Payer: COMMERCIAL

## 2018-12-24 ENCOUNTER — CLINICAL SUPPORT (OUTPATIENT)
Dept: CARDIOLOGY | Facility: HOSPITAL | Age: 73
End: 2018-12-24
Payer: COMMERCIAL

## 2018-12-24 DIAGNOSIS — I63.02 THROMBOTIC STROKE INVOLVING BASILAR ARTERY: ICD-10-CM

## 2018-12-24 PROCEDURE — 93272 CARDIAC EVENT MONITOR (CUPID ONLY): ICD-10-PCS | Mod: ,,, | Performed by: INTERNAL MEDICINE

## 2018-12-24 PROCEDURE — 93271 ECG/MONITORING AND ANALYSIS: CPT

## 2018-12-24 PROCEDURE — 93272 ECG/REVIEW INTERPRET ONLY: CPT | Mod: ,,, | Performed by: INTERNAL MEDICINE

## 2021-01-08 ENCOUNTER — OFFICE VISIT (OUTPATIENT)
Dept: FAMILY MEDICINE | Facility: CLINIC | Age: 76
End: 2021-01-08
Payer: MEDICARE

## 2021-01-08 ENCOUNTER — LAB VISIT (OUTPATIENT)
Dept: LAB | Facility: HOSPITAL | Age: 76
End: 2021-01-08
Attending: INTERNAL MEDICINE
Payer: MEDICARE

## 2021-01-08 VITALS
SYSTOLIC BLOOD PRESSURE: 124 MMHG | HEART RATE: 84 BPM | OXYGEN SATURATION: 97 % | TEMPERATURE: 98 F | BODY MASS INDEX: 18.79 KG/M2 | DIASTOLIC BLOOD PRESSURE: 80 MMHG | HEIGHT: 69 IN | WEIGHT: 126.88 LBS

## 2021-01-08 DIAGNOSIS — N18.30 STAGE 3 CHRONIC KIDNEY DISEASE, UNSPECIFIED WHETHER STAGE 3A OR 3B CKD: ICD-10-CM

## 2021-01-08 DIAGNOSIS — Z11.59 NEED FOR HEPATITIS C SCREENING TEST: ICD-10-CM

## 2021-01-08 DIAGNOSIS — E78.5 DYSLIPIDEMIA: ICD-10-CM

## 2021-01-08 DIAGNOSIS — Z12.5 SCREENING FOR MALIGNANT NEOPLASM OF PROSTATE: ICD-10-CM

## 2021-01-08 DIAGNOSIS — Z86.73 HISTORY OF STROKE: ICD-10-CM

## 2021-01-08 DIAGNOSIS — Z12.11 COLON CANCER SCREENING: ICD-10-CM

## 2021-01-08 DIAGNOSIS — I10 ESSENTIAL HYPERTENSION: Primary | ICD-10-CM

## 2021-01-08 DIAGNOSIS — R53.1 RIGHT SIDED WEAKNESS: ICD-10-CM

## 2021-01-08 DIAGNOSIS — Z23 NEED FOR PNEUMOCOCCAL VACCINATION: ICD-10-CM

## 2021-01-08 DIAGNOSIS — Z23 NEED FOR SHINGLES VACCINE: ICD-10-CM

## 2021-01-08 DIAGNOSIS — Z74.09 MOBILITY IMPAIRED: ICD-10-CM

## 2021-01-08 LAB
ALBUMIN SERPL BCP-MCNC: 3 G/DL (ref 3.5–5.2)
ALP SERPL-CCNC: 77 U/L (ref 55–135)
ALT SERPL W/O P-5'-P-CCNC: 8 U/L (ref 10–44)
ANION GAP SERPL CALC-SCNC: 11 MMOL/L (ref 8–16)
AST SERPL-CCNC: 19 U/L (ref 10–40)
BASOPHILS # BLD AUTO: 0.02 K/UL (ref 0–0.2)
BASOPHILS NFR BLD: 0.5 % (ref 0–1.9)
BILIRUB SERPL-MCNC: 0.4 MG/DL (ref 0.1–1)
BUN SERPL-MCNC: 27 MG/DL (ref 8–23)
CALCIUM SERPL-MCNC: 8.3 MG/DL (ref 8.7–10.5)
CHLORIDE SERPL-SCNC: 108 MMOL/L (ref 95–110)
CO2 SERPL-SCNC: 24 MMOL/L (ref 23–29)
COMPLEXED PSA SERPL-MCNC: 0.83 NG/ML (ref 0–4)
CREAT SERPL-MCNC: 2.2 MG/DL (ref 0.5–1.4)
DIFFERENTIAL METHOD: ABNORMAL
EOSINOPHIL # BLD AUTO: 0 K/UL (ref 0–0.5)
EOSINOPHIL NFR BLD: 1 % (ref 0–8)
ERYTHROCYTE [DISTWIDTH] IN BLOOD BY AUTOMATED COUNT: 13.6 % (ref 11.5–14.5)
EST. GFR  (AFRICAN AMERICAN): 32.9 ML/MIN/1.73 M^2
EST. GFR  (NON AFRICAN AMERICAN): 28.5 ML/MIN/1.73 M^2
GLUCOSE SERPL-MCNC: 88 MG/DL (ref 70–110)
HCT VFR BLD AUTO: 36.6 % (ref 40–54)
HGB BLD-MCNC: 10.9 G/DL (ref 14–18)
IMM GRANULOCYTES # BLD AUTO: 0.01 K/UL (ref 0–0.04)
IMM GRANULOCYTES NFR BLD AUTO: 0.2 % (ref 0–0.5)
LYMPHOCYTES # BLD AUTO: 1.9 K/UL (ref 1–4.8)
LYMPHOCYTES NFR BLD: 47 % (ref 18–48)
MCH RBC QN AUTO: 27.6 PG (ref 27–31)
MCHC RBC AUTO-ENTMCNC: 29.8 G/DL (ref 32–36)
MCV RBC AUTO: 93 FL (ref 82–98)
MONOCYTES # BLD AUTO: 0.3 K/UL (ref 0.3–1)
MONOCYTES NFR BLD: 7.3 % (ref 4–15)
NEUTROPHILS # BLD AUTO: 1.8 K/UL (ref 1.8–7.7)
NEUTROPHILS NFR BLD: 44 % (ref 38–73)
NRBC BLD-RTO: 0 /100 WBC
PLATELET # BLD AUTO: 188 K/UL (ref 150–350)
PMV BLD AUTO: 11.3 FL (ref 9.2–12.9)
POTASSIUM SERPL-SCNC: 4.2 MMOL/L (ref 3.5–5.1)
PROT SERPL-MCNC: 6.8 G/DL (ref 6–8.4)
RBC # BLD AUTO: 3.95 M/UL (ref 4.6–6.2)
SODIUM SERPL-SCNC: 143 MMOL/L (ref 136–145)
WBC # BLD AUTO: 4.11 K/UL (ref 3.9–12.7)

## 2021-01-08 PROCEDURE — 90750 HZV VACC RECOMBINANT IM: CPT | Mod: S$GLB,,, | Performed by: INTERNAL MEDICINE

## 2021-01-08 PROCEDURE — 3079F DIAST BP 80-89 MM HG: CPT | Mod: CPTII,S$GLB,, | Performed by: INTERNAL MEDICINE

## 2021-01-08 PROCEDURE — 1125F PR PAIN SEVERITY QUANTIFIED, PAIN PRESENT: ICD-10-PCS | Mod: S$GLB,,, | Performed by: INTERNAL MEDICINE

## 2021-01-08 PROCEDURE — 90750 ZOSTER RECOMBINANT VACCINE: ICD-10-PCS | Mod: S$GLB,,, | Performed by: INTERNAL MEDICINE

## 2021-01-08 PROCEDURE — 99203 PR OFFICE/OUTPT VISIT, NEW, LEVL III, 30-44 MIN: ICD-10-PCS | Mod: S$GLB,,, | Performed by: INTERNAL MEDICINE

## 2021-01-08 PROCEDURE — 3074F PR MOST RECENT SYSTOLIC BLOOD PRESSURE < 130 MM HG: ICD-10-PCS | Mod: CPTII,S$GLB,, | Performed by: INTERNAL MEDICINE

## 2021-01-08 PROCEDURE — G0009 PNEUMOCOCCAL CONJUGATE VACCINE 13-VALENT LESS THAN 5YO & GREATER THAN: ICD-10-PCS | Mod: 59,S$GLB,, | Performed by: INTERNAL MEDICINE

## 2021-01-08 PROCEDURE — 90670 PCV13 VACCINE IM: CPT | Mod: S$GLB,,, | Performed by: INTERNAL MEDICINE

## 2021-01-08 PROCEDURE — 3008F BODY MASS INDEX DOCD: CPT | Mod: CPTII,S$GLB,, | Performed by: INTERNAL MEDICINE

## 2021-01-08 PROCEDURE — 1159F MED LIST DOCD IN RCRD: CPT | Mod: S$GLB,,, | Performed by: INTERNAL MEDICINE

## 2021-01-08 PROCEDURE — 99203 OFFICE O/P NEW LOW 30 MIN: CPT | Mod: S$GLB,,, | Performed by: INTERNAL MEDICINE

## 2021-01-08 PROCEDURE — 1125F AMNT PAIN NOTED PAIN PRSNT: CPT | Mod: S$GLB,,, | Performed by: INTERNAL MEDICINE

## 2021-01-08 PROCEDURE — 85025 COMPLETE CBC W/AUTO DIFF WBC: CPT

## 2021-01-08 PROCEDURE — 99999 PR PBB SHADOW E&M-EST. PATIENT-LVL III: CPT | Mod: PBBFAC,,, | Performed by: INTERNAL MEDICINE

## 2021-01-08 PROCEDURE — 3079F PR MOST RECENT DIASTOLIC BLOOD PRESSURE 80-89 MM HG: ICD-10-PCS | Mod: CPTII,S$GLB,, | Performed by: INTERNAL MEDICINE

## 2021-01-08 PROCEDURE — 99999 PR PBB SHADOW E&M-EST. PATIENT-LVL III: ICD-10-PCS | Mod: PBBFAC,,, | Performed by: INTERNAL MEDICINE

## 2021-01-08 PROCEDURE — 80053 COMPREHEN METABOLIC PANEL: CPT

## 2021-01-08 PROCEDURE — 84153 ASSAY OF PSA TOTAL: CPT

## 2021-01-08 PROCEDURE — G0009 ADMIN PNEUMOCOCCAL VACCINE: HCPCS | Mod: 59,S$GLB,, | Performed by: INTERNAL MEDICINE

## 2021-01-08 PROCEDURE — 86803 HEPATITIS C AB TEST: CPT

## 2021-01-08 PROCEDURE — 90471 ZOSTER RECOMBINANT VACCINE: ICD-10-PCS | Mod: S$GLB,,, | Performed by: INTERNAL MEDICINE

## 2021-01-08 PROCEDURE — 90471 IMMUNIZATION ADMIN: CPT | Mod: S$GLB,,, | Performed by: INTERNAL MEDICINE

## 2021-01-08 PROCEDURE — 3008F PR BODY MASS INDEX (BMI) DOCUMENTED: ICD-10-PCS | Mod: CPTII,S$GLB,, | Performed by: INTERNAL MEDICINE

## 2021-01-08 PROCEDURE — 3074F SYST BP LT 130 MM HG: CPT | Mod: CPTII,S$GLB,, | Performed by: INTERNAL MEDICINE

## 2021-01-08 PROCEDURE — 1159F PR MEDICATION LIST DOCUMENTED IN MEDICAL RECORD: ICD-10-PCS | Mod: S$GLB,,, | Performed by: INTERNAL MEDICINE

## 2021-01-08 PROCEDURE — 36415 COLL VENOUS BLD VENIPUNCTURE: CPT | Mod: PO

## 2021-01-08 PROCEDURE — 90670 PNEUMOCOCCAL CONJUGATE VACCINE 13-VALENT LESS THAN 5YO & GREATER THAN: ICD-10-PCS | Mod: S$GLB,,, | Performed by: INTERNAL MEDICINE

## 2021-01-08 RX ORDER — ATORVASTATIN CALCIUM 40 MG/1
40 TABLET, FILM COATED ORAL DAILY
Qty: 90 TABLET | Refills: 3 | Status: SHIPPED | OUTPATIENT
Start: 2021-01-08 | End: 2021-01-12 | Stop reason: SDUPTHER

## 2021-01-08 RX ORDER — ASPIRIN 81 MG/1
81 TABLET ORAL DAILY
Refills: 0
Start: 2021-01-08 | End: 2021-01-12 | Stop reason: SDUPTHER

## 2021-01-09 ENCOUNTER — IMMUNIZATION (OUTPATIENT)
Dept: OBSTETRICS AND GYNECOLOGY | Facility: CLINIC | Age: 76
End: 2021-01-09
Payer: MEDICARE

## 2021-01-09 DIAGNOSIS — Z23 NEED FOR VACCINATION: ICD-10-CM

## 2021-01-09 PROCEDURE — 91300 COVID-19, MRNA, LNP-S, PF, 30 MCG/0.3 ML DOSE VACCINE: CPT | Mod: PBBFAC | Performed by: FAMILY MEDICINE

## 2021-01-11 LAB — HCV AB SERPL QL IA: POSITIVE

## 2021-01-12 PROBLEM — F17.210 NICOTINE DEPENDENCE, CIGARETTES, UNCOMPLICATED: Status: ACTIVE | Noted: 2018-11-16

## 2021-01-12 PROBLEM — Z86.73 HISTORY OF STROKE: Status: ACTIVE | Noted: 2018-11-22

## 2021-01-12 PROBLEM — I61.0 NONTRAUMATIC SUBCORTICAL HEMORRHAGE OF LEFT CEREBRAL HEMISPHERE: Status: RESOLVED | Noted: 2018-11-14 | Resolved: 2021-01-12

## 2021-01-12 PROBLEM — G93.5 BRAIN COMPRESSION: Status: RESOLVED | Noted: 2018-11-16 | Resolved: 2021-01-12

## 2021-01-12 RX ORDER — ASPIRIN 81 MG/1
81 TABLET ORAL DAILY
Refills: 0
Start: 2021-01-12 | End: 2022-01-12

## 2021-01-12 RX ORDER — CARVEDILOL 25 MG/1
25 TABLET ORAL 2 TIMES DAILY
Qty: 180 TABLET | Refills: 1 | Status: SHIPPED | OUTPATIENT
Start: 2021-01-12 | End: 2022-01-12

## 2021-01-12 RX ORDER — ATORVASTATIN CALCIUM 40 MG/1
40 TABLET, FILM COATED ORAL DAILY
Qty: 90 TABLET | Refills: 3 | Status: SHIPPED | OUTPATIENT
Start: 2021-01-12 | End: 2022-01-12

## 2021-01-14 DIAGNOSIS — Z11.4 ENCOUNTER FOR SPECIAL SCREENING EXAMINATION FOR HIV: ICD-10-CM

## 2021-01-14 DIAGNOSIS — R76.8 HEPATITIS C ANTIBODY TEST POSITIVE: Primary | ICD-10-CM

## 2021-01-14 DIAGNOSIS — R94.5 ABNORMAL RESULTS OF LIVER FUNCTION STUDIES: ICD-10-CM

## 2021-01-25 ENCOUNTER — TELEPHONE (OUTPATIENT)
Dept: FAMILY MEDICINE | Facility: CLINIC | Age: 76
End: 2021-01-25

## 2021-02-15 ENCOUNTER — TELEPHONE (OUTPATIENT)
Dept: FAMILY MEDICINE | Facility: CLINIC | Age: 76
End: 2021-02-15

## 2021-03-26 ENCOUNTER — PATIENT OUTREACH (OUTPATIENT)
Dept: ADMINISTRATIVE | Facility: HOSPITAL | Age: 76
End: 2021-03-26

## 2021-08-25 ENCOUNTER — LAB VISIT (OUTPATIENT)
Dept: LAB | Facility: OTHER | Age: 76
End: 2021-08-25
Payer: MEDICARE

## 2021-08-25 DIAGNOSIS — Z20.822 ENCOUNTER FOR LABORATORY TESTING FOR COVID-19 VIRUS: ICD-10-CM

## 2021-08-25 PROCEDURE — U0003 INFECTIOUS AGENT DETECTION BY NUCLEIC ACID (DNA OR RNA); SEVERE ACUTE RESPIRATORY SYNDROME CORONAVIRUS 2 (SARS-COV-2) (CORONAVIRUS DISEASE [COVID-19]), AMPLIFIED PROBE TECHNIQUE, MAKING USE OF HIGH THROUGHPUT TECHNOLOGIES AS DESCRIBED BY CMS-2020-01-R: HCPCS | Performed by: NURSE PRACTITIONER

## 2021-08-26 LAB
SARS-COV-2 RNA RESP QL NAA+PROBE: NORMAL
TEST PERFORMANCE INFO SPEC: NORMAL

## 2022-03-16 DIAGNOSIS — I10 ESSENTIAL HYPERTENSION: ICD-10-CM

## 2023-07-05 NOTE — PLAN OF CARE
Problem: Occupational Therapy Goal  Goal: Occupational Therapy Goal  Goals set 11/15 to be addressed for 7 days with expiration date, 11/22:  Patient will increase functional independence with ADLs by performing:    Patient will demonstrate rolling to the right with modified independence.  Not met   Patient will demonstrate rolling to the left with CGA.   Not met  Patient will demonstrate supine -sit with min assist.   Not met  Patient will demonstrate stand pivot transfers with min assist.   Not met  Patient will demonstrate grooming while standing with mod assist.   Not met  Patient will demonstrate upper body dressing with mod assist while seated EOB.   Not met  Patient will demonstrate lower body dressing with mod assist while seated EOB.   Not met  Patient will demonstrate toileting with mod assist.   Not met  Patient will demonstrate bathing while seated EOB with mod assist.   Not met  Patient's family / caregiver will demonstrate independence and safety with assisting patient with self-care skills and functional mobility.     Not met  Patient's family / caregiver will demonstrate independence with providing ROM and changes in bed positioning.   Not met  Patient and/or patient's family will verbalize understanding of stroke prevention guidelines, personal risk factors and stroke warning signs via teachback method.  Not met          Goals remain appropriate.  JAIRO Tellez  11/16/2018           05-Jul-2023 12:10